# Patient Record
Sex: MALE | Race: WHITE | Employment: OTHER | URBAN - METROPOLITAN AREA
[De-identification: names, ages, dates, MRNs, and addresses within clinical notes are randomized per-mention and may not be internally consistent; named-entity substitution may affect disease eponyms.]

---

## 2022-01-01 ENCOUNTER — APPOINTMENT (OUTPATIENT)
Dept: GENERAL RADIOLOGY | Age: 67
DRG: 871 | End: 2022-01-01
Payer: MEDICARE

## 2022-01-01 ENCOUNTER — HOSPITAL ENCOUNTER (INPATIENT)
Age: 67
LOS: 1 days | DRG: 871 | End: 2022-12-27
Attending: EMERGENCY MEDICINE | Admitting: INTERNAL MEDICINE
Payer: MEDICARE

## 2022-01-01 ENCOUNTER — APPOINTMENT (OUTPATIENT)
Dept: CT IMAGING | Age: 67
DRG: 871 | End: 2022-01-01
Payer: MEDICARE

## 2022-01-01 VITALS
RESPIRATION RATE: 22 BRPM | WEIGHT: 159 LBS | HEART RATE: 69 BPM | SYSTOLIC BLOOD PRESSURE: 109 MMHG | OXYGEN SATURATION: 97 % | DIASTOLIC BLOOD PRESSURE: 68 MMHG | BODY MASS INDEX: 19.87 KG/M2 | TEMPERATURE: 98.1 F

## 2022-01-01 DIAGNOSIS — J96.22 ACUTE ON CHRONIC RESPIRATORY FAILURE WITH HYPOXIA AND HYPERCAPNIA (HCC): ICD-10-CM

## 2022-01-01 DIAGNOSIS — J96.21 ACUTE ON CHRONIC RESPIRATORY FAILURE WITH HYPOXIA AND HYPERCAPNIA (HCC): ICD-10-CM

## 2022-01-01 DIAGNOSIS — E87.0 HYPERNATREMIA: Primary | ICD-10-CM

## 2022-01-01 DIAGNOSIS — J18.9 PNEUMONIA DUE TO INFECTIOUS ORGANISM, UNSPECIFIED LATERALITY, UNSPECIFIED PART OF LUNG: ICD-10-CM

## 2022-01-01 LAB
ABSOLUTE BANDS #: 3.92 K/UL (ref 0–1)
ABSOLUTE EOS #: 0 K/UL (ref 0–0.4)
ABSOLUTE LYMPH #: 0.44 K/UL (ref 1–4.8)
ABSOLUTE MONO #: 1.74 K/UL (ref 0.1–1.3)
ALBUMIN SERPL-MCNC: 3.8 G/DL (ref 3.5–5.2)
ALLEN TEST: ABNORMAL
ALP BLD-CCNC: 144 U/L (ref 40–129)
ALT SERPL-CCNC: 38 U/L (ref 5–41)
AMORPHOUS: ABNORMAL
ANION GAP SERPL CALCULATED.3IONS-SCNC: 10 MMOL/L (ref 9–17)
ANION GAP SERPL CALCULATED.3IONS-SCNC: 10 MMOL/L (ref 9–17)
ANION GAP SERPL CALCULATED.3IONS-SCNC: 12 MMOL/L (ref 9–17)
AST SERPL-CCNC: 27 U/L
BACTERIA: ABNORMAL
BANDS: 18 % (ref 0–10)
BASOPHILS # BLD: 0 % (ref 0–2)
BASOPHILS ABSOLUTE: 0 K/UL (ref 0–0.2)
BILIRUB SERPL-MCNC: 0.4 MG/DL (ref 0.3–1.2)
BILIRUBIN URINE: ABNORMAL
BUN BLDV-MCNC: 170 MG/DL (ref 8–23)
BUN BLDV-MCNC: 176 MG/DL (ref 8–23)
BUN BLDV-MCNC: 183 MG/DL (ref 8–23)
CALCIUM SERPL-MCNC: 10.8 MG/DL (ref 8.6–10.4)
CALCIUM SERPL-MCNC: 11.6 MG/DL (ref 8.6–10.4)
CALCIUM SERPL-MCNC: 12.7 MG/DL (ref 8.6–10.4)
CARBOXYHEMOGLOBIN: 1 % (ref 0–5)
CHLORIDE BLD-SCNC: 101 MMOL/L (ref 98–107)
CHLORIDE BLD-SCNC: 104 MMOL/L (ref 98–107)
CHLORIDE BLD-SCNC: 109 MMOL/L (ref 98–107)
CO2: 37 MMOL/L (ref 20–31)
CO2: 40 MMOL/L (ref 20–31)
CO2: 43 MMOL/L (ref 20–31)
COLOR: ABNORMAL
CREAT SERPL-MCNC: 3.03 MG/DL (ref 0.7–1.2)
CREAT SERPL-MCNC: 3.26 MG/DL (ref 0.7–1.2)
CREAT SERPL-MCNC: 3.29 MG/DL (ref 0.7–1.2)
EOSINOPHILS RELATIVE PERCENT: 0 % (ref 0–4)
EPITHELIAL CELLS UA: ABNORMAL /HPF
FIO2: ABNORMAL
GFR SERPL CREATININE-BSD FRML MDRD: 20 ML/MIN/1.73M2
GFR SERPL CREATININE-BSD FRML MDRD: 20 ML/MIN/1.73M2
GFR SERPL CREATININE-BSD FRML MDRD: 22 ML/MIN/1.73M2
GLUCOSE BLD-MCNC: 140 MG/DL (ref 70–99)
GLUCOSE BLD-MCNC: 168 MG/DL (ref 75–110)
GLUCOSE BLD-MCNC: 305 MG/DL (ref 70–99)
GLUCOSE BLD-MCNC: 363 MG/DL (ref 75–110)
GLUCOSE BLD-MCNC: 459 MG/DL (ref 70–99)
GLUCOSE URINE: ABNORMAL
HCO3 ARTERIAL: 45.2 MMOL/L (ref 22–26)
HCT VFR BLD CALC: 42.3 % (ref 41–53)
HEMOGLOBIN: 13.3 G/DL (ref 13.5–17.5)
INFLUENZA A: NOT DETECTED
INFLUENZA B: NOT DETECTED
INR BLD: 5
KETONES, URINE: ABNORMAL
LACTIC ACID, SEPSIS: 1.4 MMOL/L (ref 0.5–1.9)
LACTIC ACID, SEPSIS: 1.4 MMOL/L (ref 0.5–1.9)
LEUKOCYTE ESTERASE, URINE: ABNORMAL
LIPASE: 24 U/L (ref 13–60)
LYMPHOCYTES # BLD: 2 % (ref 24–44)
MAGNESIUM: 4.7 MG/DL (ref 1.6–2.6)
MCH RBC QN AUTO: 29.4 PG (ref 26–34)
MCHC RBC AUTO-ENTMCNC: 31.5 G/DL (ref 31–37)
MCV RBC AUTO: 93.4 FL (ref 80–100)
METHEMOGLOBIN: 0.6 % (ref 0–1.9)
MODE: ABNORMAL
MONOCYTES # BLD: 8 % (ref 1–7)
MORPHOLOGY: ABNORMAL
MORPHOLOGY: ABNORMAL
NITRITE, URINE: NEGATIVE
O2 DEVICE/FLOW/%: ABNORMAL
O2 SAT, ARTERIAL: 89 % (ref 95–98)
PATIENT TEMP: 37
PCO2 ARTERIAL: 76.8 MMHG (ref 35–45)
PDW BLD-RTO: 17.7 % (ref 11.5–14.9)
PH ARTERIAL: 7.38 (ref 7.35–7.45)
PH UA: 5 (ref 5–8)
PLATELET # BLD: 171 K/UL (ref 150–450)
PMV BLD AUTO: 9.8 FL (ref 6–12)
PO2 ARTERIAL: 61.5 MMHG (ref 80–100)
POSITIVE BASE EXCESS, ART: 20.1 MMOL/L (ref 0–2)
POTASSIUM SERPL-SCNC: 4.1 MMOL/L (ref 3.7–5.3)
POTASSIUM SERPL-SCNC: 4.2 MMOL/L (ref 3.7–5.3)
POTASSIUM SERPL-SCNC: 4.5 MMOL/L (ref 3.7–5.3)
PROCALCITONIN: 0.39 NG/ML
PROTEIN UA: ABNORMAL
PROTHROMBIN TIME: 46.4 SEC (ref 11.8–14.6)
PT. POSITION: ABNORMAL
RBC # BLD: 4.53 M/UL (ref 4.5–5.9)
RBC UA: ABNORMAL /HPF
REASON FOR REJECTION: NORMAL
RESPIRATORY RATE: 25
SAMPLE SITE: ABNORMAL
SARS-COV-2 RNA, RT PCR: NOT DETECTED
SEG NEUTROPHILS: 72 % (ref 36–66)
SEGMENTED NEUTROPHILS ABSOLUTE COUNT: 15.7 K/UL (ref 1.3–9.1)
SODIUM BLD-SCNC: 147 MMOL/L (ref 135–144)
SODIUM BLD-SCNC: 147 MMOL/L (ref 135–144)
SODIUM BLD-SCNC: 151 MMOL/L (ref 135–144)
SODIUM BLD-SCNC: 153 MMOL/L (ref 135–144)
SODIUM BLD-SCNC: 154 MMOL/L (ref 135–144)
SODIUM BLD-SCNC: 162 MMOL/L (ref 135–144)
SOURCE: NORMAL
SPECIFIC GRAVITY UA: 1.02 (ref 1–1.03)
SPECIMEN DESCRIPTION: NORMAL
TEXT FOR RESPIRATORY: ABNORMAL
TOTAL PROTEIN: 8.5 G/DL (ref 6.4–8.3)
TROPONIN, HIGH SENSITIVITY: 178 NG/L (ref 0–22)
TURBIDITY: ABNORMAL
URINE HGB: ABNORMAL
UROBILINOGEN, URINE: NORMAL
WBC # BLD: 21.8 K/UL (ref 3.5–11)
WBC UA: ABNORMAL /HPF
ZZ NTE CLEAN UP: ORDERED TEST: NORMAL
ZZ NTE WITH NAME CLEAN UP: SPECIMEN SOURCE: NORMAL

## 2022-01-01 PROCEDURE — 6370000000 HC RX 637 (ALT 250 FOR IP): Performed by: NURSE PRACTITIONER

## 2022-01-01 PROCEDURE — 2580000003 HC RX 258: Performed by: INTERNAL MEDICINE

## 2022-01-01 PROCEDURE — 87086 URINE CULTURE/COLONY COUNT: CPT

## 2022-01-01 PROCEDURE — 82805 BLOOD GASES W/O2 SATURATION: CPT

## 2022-01-01 PROCEDURE — 87040 BLOOD CULTURE FOR BACTERIA: CPT

## 2022-01-01 PROCEDURE — 2500000003 HC RX 250 WO HCPCS: Performed by: EMERGENCY MEDICINE

## 2022-01-01 PROCEDURE — 2580000003 HC RX 258: Performed by: EMERGENCY MEDICINE

## 2022-01-01 PROCEDURE — 1200000000 HC SEMI PRIVATE

## 2022-01-01 PROCEDURE — 80048 BASIC METABOLIC PNL TOTAL CA: CPT

## 2022-01-01 PROCEDURE — 99223 1ST HOSP IP/OBS HIGH 75: CPT | Performed by: INTERNAL MEDICINE

## 2022-01-01 PROCEDURE — 6370000000 HC RX 637 (ALT 250 FOR IP): Performed by: EMERGENCY MEDICINE

## 2022-01-01 PROCEDURE — 84295 ASSAY OF SERUM SODIUM: CPT

## 2022-01-01 PROCEDURE — 96375 TX/PRO/DX INJ NEW DRUG ADDON: CPT

## 2022-01-01 PROCEDURE — 94660 CPAP INITIATION&MGMT: CPT

## 2022-01-01 PROCEDURE — 84145 PROCALCITONIN (PCT): CPT

## 2022-01-01 PROCEDURE — 51702 INSERT TEMP BLADDER CATH: CPT

## 2022-01-01 PROCEDURE — 02HV33Z INSERTION OF INFUSION DEVICE INTO SUPERIOR VENA CAVA, PERCUTANEOUS APPROACH: ICD-10-PCS | Performed by: STUDENT IN AN ORGANIZED HEALTH CARE EDUCATION/TRAINING PROGRAM

## 2022-01-01 PROCEDURE — 6360000002 HC RX W HCPCS: Performed by: NURSE PRACTITIONER

## 2022-01-01 PROCEDURE — 71045 X-RAY EXAM CHEST 1 VIEW: CPT

## 2022-01-01 PROCEDURE — 87641 MR-STAPH DNA AMP PROBE: CPT

## 2022-01-01 PROCEDURE — 93005 ELECTROCARDIOGRAM TRACING: CPT | Performed by: EMERGENCY MEDICINE

## 2022-01-01 PROCEDURE — 36415 COLL VENOUS BLD VENIPUNCTURE: CPT

## 2022-01-01 PROCEDURE — 87636 SARSCOV2 & INF A&B AMP PRB: CPT

## 2022-01-01 PROCEDURE — 96374 THER/PROPH/DIAG INJ IV PUSH: CPT

## 2022-01-01 PROCEDURE — 82947 ASSAY GLUCOSE BLOOD QUANT: CPT

## 2022-01-01 PROCEDURE — 83735 ASSAY OF MAGNESIUM: CPT

## 2022-01-01 PROCEDURE — 83605 ASSAY OF LACTIC ACID: CPT

## 2022-01-01 PROCEDURE — 81001 URINALYSIS AUTO W/SCOPE: CPT

## 2022-01-01 PROCEDURE — 94761 N-INVAS EAR/PLS OXIMETRY MLT: CPT

## 2022-01-01 PROCEDURE — 96365 THER/PROPH/DIAG IV INF INIT: CPT

## 2022-01-01 PROCEDURE — 85025 COMPLETE CBC W/AUTO DIFF WBC: CPT

## 2022-01-01 PROCEDURE — 6360000002 HC RX W HCPCS: Performed by: EMERGENCY MEDICINE

## 2022-01-01 PROCEDURE — 84484 ASSAY OF TROPONIN QUANT: CPT

## 2022-01-01 PROCEDURE — 80053 COMPREHEN METABOLIC PANEL: CPT

## 2022-01-01 PROCEDURE — 99285 EMERGENCY DEPT VISIT HI MDM: CPT

## 2022-01-01 PROCEDURE — 2700000000 HC OXYGEN THERAPY PER DAY

## 2022-01-01 PROCEDURE — 2580000003 HC RX 258: Performed by: NURSE PRACTITIONER

## 2022-01-01 PROCEDURE — 36600 WITHDRAWAL OF ARTERIAL BLOOD: CPT

## 2022-01-01 PROCEDURE — 2500000003 HC RX 250 WO HCPCS: Performed by: NURSE PRACTITIONER

## 2022-01-01 PROCEDURE — 83690 ASSAY OF LIPASE: CPT

## 2022-01-01 PROCEDURE — 94640 AIRWAY INHALATION TREATMENT: CPT

## 2022-01-01 PROCEDURE — 71250 CT THORAX DX C-: CPT

## 2022-01-01 PROCEDURE — 85610 PROTHROMBIN TIME: CPT

## 2022-01-01 RX ORDER — FUROSEMIDE 40 MG/1
40 TABLET ORAL DAILY
Status: DISCONTINUED | OUTPATIENT
Start: 2022-01-01 | End: 2022-01-01 | Stop reason: HOSPADM

## 2022-01-01 RX ORDER — ACETAMINOPHEN 325 MG/1
650 TABLET ORAL EVERY 6 HOURS PRN
Status: DISCONTINUED | OUTPATIENT
Start: 2022-01-01 | End: 2022-01-01 | Stop reason: HOSPADM

## 2022-01-01 RX ORDER — METRONIDAZOLE 500 MG/100ML
500 INJECTION, SOLUTION INTRAVENOUS ONCE
Status: COMPLETED | OUTPATIENT
Start: 2022-01-01 | End: 2022-01-01

## 2022-01-01 RX ORDER — SODIUM CHLORIDE 9 MG/ML
INJECTION, SOLUTION INTRAVENOUS CONTINUOUS
Status: DISCONTINUED | OUTPATIENT
Start: 2022-01-01 | End: 2022-01-01 | Stop reason: HOSPADM

## 2022-01-01 RX ORDER — BISACODYL 10 MG
10 SUPPOSITORY, RECTAL RECTAL DAILY PRN
Status: DISCONTINUED | OUTPATIENT
Start: 2022-01-01 | End: 2022-01-01 | Stop reason: HOSPADM

## 2022-01-01 RX ORDER — ACETAMINOPHEN 650 MG/1
650 SUPPOSITORY RECTAL EVERY 6 HOURS PRN
Status: DISCONTINUED | OUTPATIENT
Start: 2022-01-01 | End: 2022-01-01 | Stop reason: HOSPADM

## 2022-01-01 RX ORDER — IPRATROPIUM BROMIDE AND ALBUTEROL SULFATE 2.5; .5 MG/3ML; MG/3ML
1 SOLUTION RESPIRATORY (INHALATION) EVERY 4 HOURS PRN
Status: DISCONTINUED | OUTPATIENT
Start: 2022-01-01 | End: 2022-01-01 | Stop reason: HOSPADM

## 2022-01-01 RX ORDER — DEXTROSE MONOHYDRATE 100 MG/ML
INJECTION, SOLUTION INTRAVENOUS CONTINUOUS PRN
Status: DISCONTINUED | OUTPATIENT
Start: 2022-01-01 | End: 2022-01-01 | Stop reason: HOSPADM

## 2022-01-01 RX ORDER — LORAZEPAM 2 MG/ML
1 INJECTION INTRAMUSCULAR EVERY 6 HOURS PRN
Status: DISCONTINUED | OUTPATIENT
Start: 2022-01-01 | End: 2022-01-01 | Stop reason: HOSPADM

## 2022-01-01 RX ORDER — HYDROCODONE BITARTRATE AND ACETAMINOPHEN 5; 325 MG/1; MG/1
1 TABLET ORAL EVERY 6 HOURS PRN
Status: DISCONTINUED | OUTPATIENT
Start: 2022-01-01 | End: 2022-01-01 | Stop reason: HOSPADM

## 2022-01-01 RX ORDER — SODIUM CHLORIDE 0.9 % (FLUSH) 0.9 %
5-40 SYRINGE (ML) INJECTION EVERY 12 HOURS SCHEDULED
Status: DISCONTINUED | OUTPATIENT
Start: 2022-01-01 | End: 2022-01-01 | Stop reason: HOSPADM

## 2022-01-01 RX ORDER — METRONIDAZOLE 500 MG/100ML
500 INJECTION, SOLUTION INTRAVENOUS EVERY 8 HOURS
Status: DISCONTINUED | OUTPATIENT
Start: 2022-01-01 | End: 2022-01-01 | Stop reason: HOSPADM

## 2022-01-01 RX ORDER — ALBUTEROL SULFATE 2.5 MG/3ML
2.5 SOLUTION RESPIRATORY (INHALATION) EVERY 4 HOURS PRN
Status: DISCONTINUED | OUTPATIENT
Start: 2022-01-01 | End: 2022-01-01 | Stop reason: HOSPADM

## 2022-01-01 RX ORDER — SODIUM CHLORIDE 0.9 % (FLUSH) 0.9 %
5-40 SYRINGE (ML) INJECTION PRN
Status: DISCONTINUED | OUTPATIENT
Start: 2022-01-01 | End: 2022-01-01 | Stop reason: HOSPADM

## 2022-01-01 RX ORDER — IPRATROPIUM BROMIDE AND ALBUTEROL SULFATE 2.5; .5 MG/3ML; MG/3ML
SOLUTION RESPIRATORY (INHALATION)
Status: DISPENSED
Start: 2022-01-01 | End: 2022-01-01

## 2022-01-01 RX ORDER — INSULIN GLARGINE 100 [IU]/ML
15 INJECTION, SOLUTION SUBCUTANEOUS NIGHTLY
COMMUNITY

## 2022-01-01 RX ORDER — WARFARIN SODIUM 1 MG/1
1 TABLET ORAL EVERY EVENING
COMMUNITY

## 2022-01-01 RX ORDER — INSULIN GLARGINE 100 [IU]/ML
15 INJECTION, SOLUTION SUBCUTANEOUS ONCE
Status: COMPLETED | OUTPATIENT
Start: 2022-01-01 | End: 2022-01-01

## 2022-01-01 RX ORDER — SODIUM CHLORIDE 9 MG/ML
INJECTION, SOLUTION INTRAVENOUS PRN
Status: DISCONTINUED | OUTPATIENT
Start: 2022-01-01 | End: 2022-01-01 | Stop reason: HOSPADM

## 2022-01-01 RX ORDER — LINEZOLID 2 MG/ML
600 INJECTION, SOLUTION INTRAVENOUS EVERY 12 HOURS
Status: DISCONTINUED | OUTPATIENT
Start: 2022-01-01 | End: 2022-01-01 | Stop reason: HOSPADM

## 2022-01-01 RX ORDER — SODIUM CHLORIDE 9 MG/ML
INJECTION, SOLUTION INTRAVENOUS CONTINUOUS
Status: DISCONTINUED | OUTPATIENT
Start: 2022-01-01 | End: 2022-01-01

## 2022-01-01 RX ORDER — INSULIN LISPRO 100 [IU]/ML
0-4 INJECTION, SOLUTION INTRAVENOUS; SUBCUTANEOUS NIGHTLY
Status: DISCONTINUED | OUTPATIENT
Start: 2022-01-01 | End: 2022-01-01 | Stop reason: HOSPADM

## 2022-01-01 RX ORDER — CARVEDILOL 6.25 MG/1
6.25 TABLET ORAL 2 TIMES DAILY
Status: DISCONTINUED | OUTPATIENT
Start: 2022-01-01 | End: 2022-01-01 | Stop reason: HOSPADM

## 2022-01-01 RX ORDER — INSULIN LISPRO 100 [IU]/ML
0-8 INJECTION, SOLUTION INTRAVENOUS; SUBCUTANEOUS
Status: DISCONTINUED | OUTPATIENT
Start: 2022-01-01 | End: 2022-01-01 | Stop reason: HOSPADM

## 2022-01-01 RX ORDER — HYDROCODONE BITARTRATE AND ACETAMINOPHEN 5; 325 MG/1; MG/1
1 TABLET ORAL EVERY 6 HOURS PRN
COMMUNITY

## 2022-01-01 RX ORDER — SPIRONOLACTONE 25 MG/1
25 TABLET ORAL DAILY
Status: DISCONTINUED | OUTPATIENT
Start: 2022-01-01 | End: 2022-01-01 | Stop reason: HOSPADM

## 2022-01-01 RX ORDER — INSULIN GLARGINE 100 [IU]/ML
15 INJECTION, SOLUTION SUBCUTANEOUS NIGHTLY
Status: DISCONTINUED | OUTPATIENT
Start: 2022-01-01 | End: 2022-01-01 | Stop reason: HOSPADM

## 2022-01-01 RX ORDER — POLYETHYLENE GLYCOL 3350 17 G/17G
17 POWDER, FOR SOLUTION ORAL DAILY
Status: DISCONTINUED | OUTPATIENT
Start: 2022-01-01 | End: 2022-01-01 | Stop reason: HOSPADM

## 2022-01-01 RX ORDER — INSULIN LISPRO 100 [IU]/ML
5 INJECTION, SOLUTION INTRAVENOUS; SUBCUTANEOUS ONCE
Status: COMPLETED | OUTPATIENT
Start: 2022-01-01 | End: 2022-01-01

## 2022-01-01 RX ORDER — 0.9 % SODIUM CHLORIDE 0.9 %
500 INTRAVENOUS SOLUTION INTRAVENOUS ONCE
Status: COMPLETED | OUTPATIENT
Start: 2022-01-01 | End: 2022-01-01

## 2022-01-01 RX ORDER — 0.9 % SODIUM CHLORIDE 0.9 %
500 INTRAVENOUS SOLUTION INTRAVENOUS ONCE
Status: DISCONTINUED | OUTPATIENT
Start: 2022-01-01 | End: 2022-01-01 | Stop reason: HOSPADM

## 2022-01-01 RX ADMIN — POLYETHYLENE GLYCOL 3350 17 G: 17 POWDER, FOR SOLUTION ORAL at 11:32

## 2022-01-01 RX ADMIN — DEXTROSE MONOHYDRATE: 50 INJECTION, SOLUTION INTRAVENOUS at 21:08

## 2022-01-01 RX ADMIN — IPRATROPIUM BROMIDE AND ALBUTEROL SULFATE 1 AMPULE: 2.5; .5 SOLUTION RESPIRATORY (INHALATION) at 20:48

## 2022-01-01 RX ADMIN — METRONIDAZOLE 500 MG: 500 INJECTION, SOLUTION INTRAVENOUS at 20:30

## 2022-01-01 RX ADMIN — INSULIN LISPRO 5 UNITS: 100 INJECTION, SOLUTION INTRAVENOUS; SUBCUTANEOUS at 01:53

## 2022-01-01 RX ADMIN — METRONIDAZOLE 500 MG: 500 INJECTION, SOLUTION INTRAVENOUS at 04:37

## 2022-01-01 RX ADMIN — VASOPRESSIN: 20 INJECTION, SOLUTION INTRAVENOUS at 10:34

## 2022-01-01 RX ADMIN — CEFEPIME 2000 MG: 2 INJECTION, POWDER, FOR SOLUTION INTRAVENOUS at 19:49

## 2022-01-01 RX ADMIN — SODIUM CHLORIDE 500 ML: 9 INJECTION, SOLUTION INTRAVENOUS at 00:11

## 2022-01-01 RX ADMIN — VASOPRESSIN: 20 INJECTION, SOLUTION INTRAVENOUS at 16:50

## 2022-01-01 RX ADMIN — INSULIN LISPRO 6 UNITS: 100 INJECTION, SOLUTION INTRAVENOUS; SUBCUTANEOUS at 08:16

## 2022-01-01 RX ADMIN — VASOPRESSIN: 20 INJECTION, SOLUTION INTRAVENOUS at 02:55

## 2022-01-01 RX ADMIN — SODIUM CHLORIDE: 9 INJECTION, SOLUTION INTRAVENOUS at 19:39

## 2022-01-01 RX ADMIN — INSULIN GLARGINE 15 UNITS: 100 INJECTION, SOLUTION SUBCUTANEOUS at 01:51

## 2022-01-01 RX ADMIN — METRONIDAZOLE 500 MG: 500 INJECTION, SOLUTION INTRAVENOUS at 12:40

## 2022-01-01 RX ADMIN — IPRATROPIUM BROMIDE AND ALBUTEROL SULFATE 1 AMPULE: 2.5; .5 SOLUTION RESPIRATORY (INHALATION) at 20:49

## 2022-01-01 RX ADMIN — VANCOMYCIN HYDROCHLORIDE 1750 MG: 1 INJECTION, POWDER, LYOPHILIZED, FOR SOLUTION INTRAVENOUS at 20:28

## 2022-01-01 RX ADMIN — CEFEPIME 1000 MG: 1 INJECTION, POWDER, FOR SOLUTION INTRAMUSCULAR; INTRAVENOUS at 09:37

## 2022-01-01 RX ADMIN — DEXTROSE MONOHYDRATE 5 MCG/MIN: 50 INJECTION, SOLUTION INTRAVENOUS at 06:10

## 2022-09-27 ENCOUNTER — APPOINTMENT (OUTPATIENT)
Dept: CT IMAGING | Age: 67
DRG: 871 | End: 2022-09-27
Payer: MEDICARE

## 2022-09-27 ENCOUNTER — HOSPITAL ENCOUNTER (INPATIENT)
Age: 67
LOS: 1 days | Discharge: ANOTHER HOSPITAL | DRG: 871 | End: 2022-09-28
Attending: EMERGENCY MEDICINE | Admitting: INTERNAL MEDICINE
Payer: MEDICARE

## 2022-09-27 ENCOUNTER — APPOINTMENT (OUTPATIENT)
Dept: GENERAL RADIOLOGY | Age: 67
DRG: 871 | End: 2022-09-27
Payer: MEDICARE

## 2022-09-27 DIAGNOSIS — R65.21 SEPTIC SHOCK (HCC): Primary | ICD-10-CM

## 2022-09-27 DIAGNOSIS — J18.9 PNEUMONIA OF RIGHT LOWER LOBE DUE TO INFECTIOUS ORGANISM: ICD-10-CM

## 2022-09-27 DIAGNOSIS — A41.9 SEPTIC SHOCK (HCC): Primary | ICD-10-CM

## 2022-09-27 DIAGNOSIS — L08.9 FOOT INFECTION: ICD-10-CM

## 2022-09-27 LAB
ABSOLUTE EOS #: 0 K/UL (ref 0–0.4)
ABSOLUTE IMMATURE GRANULOCYTE: 0 K/UL (ref 0–0.3)
ABSOLUTE LYMPH #: 0.48 K/UL (ref 1–4.8)
ABSOLUTE MONO #: 1.91 K/UL (ref 0.1–0.8)
ALBUMIN SERPL-MCNC: 2.9 G/DL (ref 3.5–5.2)
ALBUMIN/GLOBULIN RATIO: 0.8 (ref 1–2.5)
ALP BLD-CCNC: 119 U/L (ref 40–129)
ALT SERPL-CCNC: 9 U/L (ref 5–41)
AMMONIA: 20 UMOL/L (ref 16–60)
ANION GAP SERPL CALCULATED.3IONS-SCNC: 13 MMOL/L (ref 9–17)
AST SERPL-CCNC: 14 U/L
BASOPHILS # BLD: 0 % (ref 0–2)
BASOPHILS ABSOLUTE: 0 K/UL (ref 0–0.2)
BILIRUB SERPL-MCNC: 0.4 MG/DL (ref 0.3–1.2)
BUN BLDV-MCNC: 56 MG/DL (ref 8–23)
CALCIUM SERPL-MCNC: 8.4 MG/DL (ref 8.6–10.4)
CHLORIDE BLD-SCNC: 86 MMOL/L (ref 98–107)
CHP ED QC CHECK: YES
CHP ED QC CHECK: YES
CO2: 25 MMOL/L (ref 20–31)
CREAT SERPL-MCNC: 2.04 MG/DL (ref 0.7–1.2)
EOSINOPHILS RELATIVE PERCENT: 0 % (ref 1–4)
GFR AFRICAN AMERICAN: 40 ML/MIN
GFR NON-AFRICAN AMERICAN: 33 ML/MIN
GFR SERPL CREATININE-BSD FRML MDRD: ABNORMAL ML/MIN/{1.73_M2}
GLUCOSE BLD-MCNC: 356 MG/DL
GLUCOSE BLD-MCNC: 356 MG/DL (ref 75–110)
GLUCOSE BLD-MCNC: 396 MG/DL
GLUCOSE BLD-MCNC: 396 MG/DL (ref 75–110)
GLUCOSE BLD-MCNC: 407 MG/DL (ref 70–99)
GLUCOSE BLD-MCNC: 455 MG/DL (ref 75–110)
GLUCOSE BLD-MCNC: 463 MG/DL (ref 75–110)
HCT VFR BLD CALC: 26.4 % (ref 40.7–50.3)
HCT VFR BLD CALC: 28.7 % (ref 40.7–50.3)
HEMOGLOBIN: 8.9 G/DL (ref 13–17)
HEMOGLOBIN: 9.6 G/DL (ref 13–17)
IMMATURE GRANULOCYTES: 0 %
INR BLD: 1.6
LACTIC ACID, WHOLE BLOOD: 1.9 MMOL/L (ref 0.7–2.1)
LACTIC ACID, WHOLE BLOOD: 2.7 MMOL/L (ref 0.7–2.1)
LIPASE: 8 U/L (ref 13–60)
LYMPHOCYTES # BLD: 2 % (ref 24–44)
MCH RBC QN AUTO: 31.4 PG (ref 25.2–33.5)
MCH RBC QN AUTO: 31.4 PG (ref 25.2–33.5)
MCHC RBC AUTO-ENTMCNC: 33.4 G/DL (ref 28.4–34.8)
MCHC RBC AUTO-ENTMCNC: 33.7 G/DL (ref 28.4–34.8)
MCV RBC AUTO: 93.3 FL (ref 82.6–102.9)
MCV RBC AUTO: 93.8 FL (ref 82.6–102.9)
MONOCYTES # BLD: 8 % (ref 1–7)
MORPHOLOGY: ABNORMAL
MORPHOLOGY: ABNORMAL
NRBC AUTOMATED: 0 PER 100 WBC
NRBC AUTOMATED: 0 PER 100 WBC
PARTIAL THROMBOPLASTIN TIME: 31.3 SEC (ref 20.5–30.5)
PARTIAL THROMBOPLASTIN TIME: 32.4 SEC (ref 20.5–30.5)
PDW BLD-RTO: 13.5 % (ref 11.8–14.4)
PDW BLD-RTO: 13.7 % (ref 11.8–14.4)
PLATELET # BLD: 193 K/UL (ref 138–453)
PLATELET # BLD: 223 K/UL (ref 138–453)
PMV BLD AUTO: 10.5 FL (ref 8.1–13.5)
PMV BLD AUTO: 10.6 FL (ref 8.1–13.5)
POTASSIUM SERPL-SCNC: 4.8 MMOL/L (ref 3.7–5.3)
PROTHROMBIN TIME: 15.9 SEC (ref 9.1–12.3)
RBC # BLD: 2.83 M/UL (ref 4.21–5.77)
RBC # BLD: 3.06 M/UL (ref 4.21–5.77)
SARS-COV-2, RAPID: NOT DETECTED
SEG NEUTROPHILS: 90 % (ref 36–66)
SEGMENTED NEUTROPHILS ABSOLUTE COUNT: 21.51 K/UL (ref 1.8–7.7)
SODIUM BLD-SCNC: 124 MMOL/L (ref 135–144)
SPECIMEN DESCRIPTION: NORMAL
THYROXINE, FREE: 1.6 NG/DL (ref 0.93–1.7)
TOTAL PROTEIN: 6.6 G/DL (ref 6.4–8.3)
TROPONIN, HIGH SENSITIVITY: 120 NG/L (ref 0–22)
TROPONIN, HIGH SENSITIVITY: 151 NG/L (ref 0–22)
TSH SERPL DL<=0.05 MIU/L-ACNC: 0.16 UIU/ML (ref 0.3–5)
WBC # BLD: 23.9 K/UL (ref 3.5–11.3)
WBC # BLD: 34.7 K/UL (ref 3.5–11.3)

## 2022-09-27 PROCEDURE — C9113 INJ PANTOPRAZOLE SODIUM, VIA: HCPCS | Performed by: STUDENT IN AN ORGANIZED HEALTH CARE EDUCATION/TRAINING PROGRAM

## 2022-09-27 PROCEDURE — 87077 CULTURE AEROBIC IDENTIFY: CPT

## 2022-09-27 PROCEDURE — 71045 X-RAY EXAM CHEST 1 VIEW: CPT

## 2022-09-27 PROCEDURE — 85610 PROTHROMBIN TIME: CPT

## 2022-09-27 PROCEDURE — 06HY33Z INSERTION OF INFUSION DEVICE INTO LOWER VEIN, PERCUTANEOUS APPROACH: ICD-10-PCS | Performed by: STUDENT IN AN ORGANIZED HEALTH CARE EDUCATION/TRAINING PROGRAM

## 2022-09-27 PROCEDURE — 87040 BLOOD CULTURE FOR BACTERIA: CPT

## 2022-09-27 PROCEDURE — 2000000000 HC ICU R&B

## 2022-09-27 PROCEDURE — 85027 COMPLETE CBC AUTOMATED: CPT

## 2022-09-27 PROCEDURE — 96365 THER/PROPH/DIAG IV INF INIT: CPT

## 2022-09-27 PROCEDURE — 96375 TX/PRO/DX INJ NEW DRUG ADDON: CPT

## 2022-09-27 PROCEDURE — 85730 THROMBOPLASTIN TIME PARTIAL: CPT

## 2022-09-27 PROCEDURE — 99285 EMERGENCY DEPT VISIT HI MDM: CPT

## 2022-09-27 PROCEDURE — 83690 ASSAY OF LIPASE: CPT

## 2022-09-27 PROCEDURE — 87154 CUL TYP ID BLD PTHGN 6+ TRGT: CPT

## 2022-09-27 PROCEDURE — 73630 X-RAY EXAM OF FOOT: CPT

## 2022-09-27 PROCEDURE — 6370000000 HC RX 637 (ALT 250 FOR IP): Performed by: STUDENT IN AN ORGANIZED HEALTH CARE EDUCATION/TRAINING PROGRAM

## 2022-09-27 PROCEDURE — 87186 SC STD MICRODIL/AGAR DIL: CPT

## 2022-09-27 PROCEDURE — 87205 SMEAR GRAM STAIN: CPT

## 2022-09-27 PROCEDURE — 86403 PARTICLE AGGLUT ANTBDY SCRN: CPT

## 2022-09-27 PROCEDURE — A4216 STERILE WATER/SALINE, 10 ML: HCPCS | Performed by: STUDENT IN AN ORGANIZED HEALTH CARE EDUCATION/TRAINING PROGRAM

## 2022-09-27 PROCEDURE — 70450 CT HEAD/BRAIN W/O DYE: CPT

## 2022-09-27 PROCEDURE — 84484 ASSAY OF TROPONIN QUANT: CPT

## 2022-09-27 PROCEDURE — 83605 ASSAY OF LACTIC ACID: CPT

## 2022-09-27 PROCEDURE — 2580000003 HC RX 258

## 2022-09-27 PROCEDURE — 96361 HYDRATE IV INFUSION ADD-ON: CPT

## 2022-09-27 PROCEDURE — 93005 ELECTROCARDIOGRAM TRACING: CPT | Performed by: STUDENT IN AN ORGANIZED HEALTH CARE EDUCATION/TRAINING PROGRAM

## 2022-09-27 PROCEDURE — 87075 CULTR BACTERIA EXCEPT BLOOD: CPT

## 2022-09-27 PROCEDURE — 84439 ASSAY OF FREE THYROXINE: CPT

## 2022-09-27 PROCEDURE — 87635 SARS-COV-2 COVID-19 AMP PRB: CPT

## 2022-09-27 PROCEDURE — 2580000003 HC RX 258: Performed by: STUDENT IN AN ORGANIZED HEALTH CARE EDUCATION/TRAINING PROGRAM

## 2022-09-27 PROCEDURE — 2500000003 HC RX 250 WO HCPCS

## 2022-09-27 PROCEDURE — 85025 COMPLETE CBC W/AUTO DIFF WBC: CPT

## 2022-09-27 PROCEDURE — 6360000002 HC RX W HCPCS: Performed by: STUDENT IN AN ORGANIZED HEALTH CARE EDUCATION/TRAINING PROGRAM

## 2022-09-27 PROCEDURE — 82947 ASSAY GLUCOSE BLOOD QUANT: CPT

## 2022-09-27 PROCEDURE — 6360000004 HC RX CONTRAST MEDICATION: Performed by: EMERGENCY MEDICINE

## 2022-09-27 PROCEDURE — 84443 ASSAY THYROID STIM HORMONE: CPT

## 2022-09-27 PROCEDURE — 96367 TX/PROPH/DG ADDL SEQ IV INF: CPT

## 2022-09-27 PROCEDURE — 6370000000 HC RX 637 (ALT 250 FOR IP)

## 2022-09-27 PROCEDURE — 93005 ELECTROCARDIOGRAM TRACING: CPT

## 2022-09-27 PROCEDURE — 99291 CRITICAL CARE FIRST HOUR: CPT | Performed by: INTERNAL MEDICINE

## 2022-09-27 PROCEDURE — 74177 CT ABD & PELVIS W/CONTRAST: CPT

## 2022-09-27 PROCEDURE — 80053 COMPREHEN METABOLIC PANEL: CPT

## 2022-09-27 PROCEDURE — 6360000002 HC RX W HCPCS

## 2022-09-27 PROCEDURE — 82140 ASSAY OF AMMONIA: CPT

## 2022-09-27 PROCEDURE — 87070 CULTURE OTHR SPECIMN AEROBIC: CPT

## 2022-09-27 RX ORDER — HEPARIN SODIUM AND DEXTROSE 10000; 5 [USP'U]/100ML; G/100ML
5-30 INJECTION INTRAVENOUS CONTINUOUS
Status: CANCELLED | OUTPATIENT
Start: 2022-09-27

## 2022-09-27 RX ORDER — AMOXICILLIN 250 MG
1 CAPSULE ORAL DAILY
COMMUNITY

## 2022-09-27 RX ORDER — HYDROCODONE BITARTRATE AND ACETAMINOPHEN 5; 325 MG/1; MG/1
1 TABLET ORAL EVERY 6 HOURS PRN
Status: DISCONTINUED | OUTPATIENT
Start: 2022-09-27 | End: 2022-09-28 | Stop reason: HOSPADM

## 2022-09-27 RX ORDER — 0.9 % SODIUM CHLORIDE 0.9 %
1000 INTRAVENOUS SOLUTION INTRAVENOUS ONCE
Status: COMPLETED | OUTPATIENT
Start: 2022-09-27 | End: 2022-09-27

## 2022-09-27 RX ORDER — SPIRONOLACTONE 25 MG/1
25 TABLET ORAL DAILY
COMMUNITY

## 2022-09-27 RX ORDER — POLYETHYLENE GLYCOL 3350 17 G/17G
17 POWDER, FOR SOLUTION ORAL 2 TIMES DAILY
Status: DISCONTINUED | OUTPATIENT
Start: 2022-09-27 | End: 2022-09-28 | Stop reason: HOSPADM

## 2022-09-27 RX ORDER — HEPARIN SODIUM 1000 [USP'U]/ML
30 INJECTION, SOLUTION INTRAVENOUS; SUBCUTANEOUS PRN
Status: CANCELLED | OUTPATIENT
Start: 2022-09-27

## 2022-09-27 RX ORDER — ACETAMINOPHEN 325 MG/1
650 TABLET ORAL EVERY 6 HOURS PRN
Status: DISCONTINUED | OUTPATIENT
Start: 2022-09-27 | End: 2022-09-28 | Stop reason: HOSPADM

## 2022-09-27 RX ORDER — HEPARIN SODIUM 1000 [USP'U]/ML
4000 INJECTION, SOLUTION INTRAVENOUS; SUBCUTANEOUS ONCE
Status: COMPLETED | OUTPATIENT
Start: 2022-09-27 | End: 2022-09-28

## 2022-09-27 RX ORDER — BISACODYL 10 MG
10 SUPPOSITORY, RECTAL RECTAL DAILY
COMMUNITY

## 2022-09-27 RX ORDER — MIDODRINE HYDROCHLORIDE 5 MG/1
10 TABLET ORAL DAILY
COMMUNITY

## 2022-09-27 RX ORDER — POLYETHYLENE GLYCOL 3350 17 G/17G
17 POWDER, FOR SOLUTION ORAL DAILY
COMMUNITY

## 2022-09-27 RX ORDER — SODIUM CHLORIDE 9 MG/ML
1000 INJECTION, SOLUTION INTRAVENOUS CONTINUOUS
Status: ACTIVE | OUTPATIENT
Start: 2022-09-27 | End: 2022-09-27

## 2022-09-27 RX ORDER — DEXTROSE MONOHYDRATE 100 MG/ML
INJECTION, SOLUTION INTRAVENOUS CONTINUOUS PRN
Status: DISCONTINUED | OUTPATIENT
Start: 2022-09-27 | End: 2022-09-28 | Stop reason: HOSPADM

## 2022-09-27 RX ORDER — HEPARIN SODIUM 1000 [USP'U]/ML
60 INJECTION, SOLUTION INTRAVENOUS; SUBCUTANEOUS PRN
Status: CANCELLED | OUTPATIENT
Start: 2022-09-27

## 2022-09-27 RX ORDER — NOREPINEPHRINE BIT/0.9 % NACL 16MG/250ML
1-100 INFUSION BOTTLE (ML) INTRAVENOUS CONTINUOUS
Status: DISCONTINUED | OUTPATIENT
Start: 2022-09-27 | End: 2022-09-28

## 2022-09-27 RX ORDER — HEPARIN SODIUM 1000 [USP'U]/ML
2000 INJECTION, SOLUTION INTRAVENOUS; SUBCUTANEOUS PRN
Status: DISCONTINUED | OUTPATIENT
Start: 2022-09-27 | End: 2022-09-28 | Stop reason: HOSPADM

## 2022-09-27 RX ORDER — SODIUM CHLORIDE 9 MG/ML
INJECTION, SOLUTION INTRAVENOUS PRN
Status: DISCONTINUED | OUTPATIENT
Start: 2022-09-27 | End: 2022-09-28 | Stop reason: HOSPADM

## 2022-09-27 RX ORDER — METHYLPREDNISOLONE 4 MG/1
4 TABLET ORAL SEE ADMIN INSTRUCTIONS
COMMUNITY

## 2022-09-27 RX ORDER — HEPARIN SODIUM 1000 [USP'U]/ML
4000 INJECTION, SOLUTION INTRAVENOUS; SUBCUTANEOUS PRN
Status: DISCONTINUED | OUTPATIENT
Start: 2022-09-27 | End: 2022-09-28 | Stop reason: HOSPADM

## 2022-09-27 RX ORDER — AMMONIUM LACTATE 12 G/100G
CREAM TOPICAL PRN
COMMUNITY

## 2022-09-27 RX ORDER — SODIUM CHLORIDE 9 MG/ML
INJECTION, SOLUTION INTRAVENOUS CONTINUOUS
Status: DISCONTINUED | OUTPATIENT
Start: 2022-09-27 | End: 2022-09-28 | Stop reason: HOSPADM

## 2022-09-27 RX ORDER — ONDANSETRON 4 MG/1
4 TABLET, ORALLY DISINTEGRATING ORAL EVERY 8 HOURS PRN
Status: DISCONTINUED | OUTPATIENT
Start: 2022-09-27 | End: 2022-09-28 | Stop reason: HOSPADM

## 2022-09-27 RX ORDER — HYDROCODONE BITARTRATE AND ACETAMINOPHEN 5; 325 MG/1; MG/1
1 TABLET ORAL EVERY 6 HOURS PRN
COMMUNITY

## 2022-09-27 RX ORDER — SODIUM CHLORIDE, SODIUM LACTATE, POTASSIUM CHLORIDE, AND CALCIUM CHLORIDE .6; .31; .03; .02 G/100ML; G/100ML; G/100ML; G/100ML
1000 INJECTION, SOLUTION INTRAVENOUS ONCE
Status: COMPLETED | OUTPATIENT
Start: 2022-09-27 | End: 2022-09-27

## 2022-09-27 RX ORDER — ACETAMINOPHEN 650 MG/1
650 SUPPOSITORY RECTAL EVERY 6 HOURS PRN
Status: DISCONTINUED | OUTPATIENT
Start: 2022-09-27 | End: 2022-09-28 | Stop reason: HOSPADM

## 2022-09-27 RX ORDER — SODIUM CHLORIDE 0.9 % (FLUSH) 0.9 %
5-40 SYRINGE (ML) INJECTION EVERY 12 HOURS SCHEDULED
Status: DISCONTINUED | OUTPATIENT
Start: 2022-09-27 | End: 2022-09-28 | Stop reason: HOSPADM

## 2022-09-27 RX ORDER — WARFARIN SODIUM 6 MG/1
6 TABLET ORAL
COMMUNITY

## 2022-09-27 RX ORDER — VANCOMYCIN HYDROCHLORIDE 1 G/200ML
1000 INJECTION, SOLUTION INTRAVENOUS ONCE
Status: COMPLETED | OUTPATIENT
Start: 2022-09-27 | End: 2022-09-27

## 2022-09-27 RX ORDER — PHENYLEPHRINE HCL IN 0.9% NACL 50MG/250ML
10-300 PLASTIC BAG, INJECTION (ML) INTRAVENOUS CONTINUOUS
Status: DISCONTINUED | OUTPATIENT
Start: 2022-09-27 | End: 2022-09-28 | Stop reason: HOSPADM

## 2022-09-27 RX ORDER — ONDANSETRON 2 MG/ML
4 INJECTION INTRAMUSCULAR; INTRAVENOUS EVERY 6 HOURS PRN
Status: DISCONTINUED | OUTPATIENT
Start: 2022-09-27 | End: 2022-09-28 | Stop reason: HOSPADM

## 2022-09-27 RX ORDER — HEPARIN SODIUM AND DEXTROSE 10000; 5 [USP'U]/100ML; G/100ML
5-30 INJECTION INTRAVENOUS CONTINUOUS
Status: DISCONTINUED | OUTPATIENT
Start: 2022-09-27 | End: 2022-09-28

## 2022-09-27 RX ORDER — HEPARIN SODIUM 1000 [USP'U]/ML
60 INJECTION, SOLUTION INTRAVENOUS; SUBCUTANEOUS ONCE
Status: CANCELLED | OUTPATIENT
Start: 2022-09-27 | End: 2022-09-27

## 2022-09-27 RX ORDER — POLYETHYLENE GLYCOL 3350 17 G/17G
17 POWDER, FOR SOLUTION ORAL DAILY PRN
Status: DISCONTINUED | OUTPATIENT
Start: 2022-09-27 | End: 2022-09-28 | Stop reason: HOSPADM

## 2022-09-27 RX ORDER — SODIUM CHLORIDE 0.9 % (FLUSH) 0.9 %
5-40 SYRINGE (ML) INJECTION PRN
Status: DISCONTINUED | OUTPATIENT
Start: 2022-09-27 | End: 2022-09-28 | Stop reason: HOSPADM

## 2022-09-27 RX ORDER — BUMETANIDE 1 MG/1
1 TABLET ORAL DAILY
COMMUNITY

## 2022-09-27 RX ORDER — NOREPINEPHRINE BIT/0.9 % NACL 16MG/250ML
5 INFUSION BOTTLE (ML) INTRAVENOUS CONTINUOUS
Status: DISCONTINUED | OUTPATIENT
Start: 2022-09-27 | End: 2022-09-27

## 2022-09-27 RX ADMIN — HYDROCORTISONE SODIUM SUCCINATE 100 MG: 100 INJECTION, POWDER, FOR SOLUTION INTRAMUSCULAR; INTRAVENOUS at 16:44

## 2022-09-27 RX ADMIN — SODIUM CHLORIDE 1000 ML: 9 INJECTION, SOLUTION INTRAVENOUS at 12:02

## 2022-09-27 RX ADMIN — HYDROCODONE BITARTRATE AND ACETAMINOPHEN 1 TABLET: 5; 325 TABLET ORAL at 13:55

## 2022-09-27 RX ADMIN — Medication 5 MCG/MIN: at 15:03

## 2022-09-27 RX ADMIN — MEROPENEM 1000 MG: 1 INJECTION, POWDER, FOR SOLUTION INTRAVENOUS at 13:59

## 2022-09-27 RX ADMIN — HYDROCORTISONE SODIUM SUCCINATE 50 MG: 100 INJECTION, POWDER, FOR SOLUTION INTRAMUSCULAR; INTRAVENOUS at 20:16

## 2022-09-27 RX ADMIN — SODIUM CHLORIDE 11.85 UNITS/HR: 9 INJECTION, SOLUTION INTRAVENOUS at 21:13

## 2022-09-27 RX ADMIN — SODIUM CHLORIDE, PRESERVATIVE FREE 40 MG: 5 INJECTION INTRAVENOUS at 20:16

## 2022-09-27 RX ADMIN — SODIUM CHLORIDE, POTASSIUM CHLORIDE, SODIUM LACTATE AND CALCIUM CHLORIDE 1000 ML: 600; 310; 30; 20 INJECTION, SOLUTION INTRAVENOUS at 20:10

## 2022-09-27 RX ADMIN — SODIUM CHLORIDE: 9 INJECTION, SOLUTION INTRAVENOUS at 19:18

## 2022-09-27 RX ADMIN — IOPAMIDOL 75 ML: 755 INJECTION, SOLUTION INTRAVENOUS at 13:40

## 2022-09-27 RX ADMIN — VANCOMYCIN HYDROCHLORIDE 1000 MG: 1 INJECTION, SOLUTION INTRAVENOUS at 14:39

## 2022-09-27 ASSESSMENT — ENCOUNTER SYMPTOMS
VOMITING: 1
TACHYPNEA: 1
COLOR CHANGE: 1
CHEST TIGHTNESS: 0
GASTROINTESTINAL NEGATIVE: 1
ABDOMINAL PAIN: 1
BACK PAIN: 0
NAUSEA: 1
SORE THROAT: 0
SHORTNESS OF BREATH: 0
RESPIRATORY NEGATIVE: 1

## 2022-09-27 ASSESSMENT — PAIN SCALES - GENERAL
PAINLEVEL_OUTOF10: 8
PAINLEVEL_OUTOF10: 10

## 2022-09-27 ASSESSMENT — PAIN - FUNCTIONAL ASSESSMENT
PAIN_FUNCTIONAL_ASSESSMENT: NONE - DENIES PAIN
PAIN_FUNCTIONAL_ASSESSMENT: 0-10

## 2022-09-27 ASSESSMENT — PAIN DESCRIPTION - LOCATION
LOCATION: FOOT
LOCATION: FOOT

## 2022-09-27 ASSESSMENT — PAIN DESCRIPTION - FREQUENCY: FREQUENCY: CONTINUOUS

## 2022-09-27 ASSESSMENT — PAIN DESCRIPTION - PAIN TYPE: TYPE: CHRONIC PAIN

## 2022-09-27 ASSESSMENT — PAIN DESCRIPTION - ORIENTATION
ORIENTATION: LEFT
ORIENTATION: LEFT

## 2022-09-27 NOTE — ED PROVIDER NOTES
[facility]  ED  Emergency Department Encounter  Emergency Medicine Resident     Pt Name:Alex Jean Baptiste  MRN: [id number]  [place] 1955  Date of evaluation: 9/27/22  PCP:  PROVIDER Destiney Moreno MD      [address] [place] [address]       Chief Complaint   Patient presents with    Hyperglycemia    Fatigue       HISTORY OF PRESENT ILLNESS  (Location/Symptom, Timing/Onset, Context/Setting, Quality, Duration, Modifying Factors, Severity.)      Cristofer Benites is a 79 y.o. male h/o A-fib on coumadin, diabetes, right BKA, CVA, and PEG tube. who presents via EMS with hyperglycemia, and altered mental status facility where he lives today. Per EMS report patient's blood sugars have been in the 400s for the past day, hypotensive en route. Upon arrival patient is AO x3-4. Patient states he has been nauseous and vomiting and notes abdominal discomfort. Denies modifying factors. Denies chest pain, shortness of breath, fever, chills. PAST MEDICAL / SURGICAL / SOCIAL / FAMILY HISTORY      has no past medical history on file. A. Fib, diabetes, right BKA, PEG tube     has no past surgical history on file.   PEG tube    Social History     Socioeconomic History    Marital status: Single     Spouse name: Not on file    Number of children: Not on file    Years of education: Not on file    Highest education level: Not on file   Occupational History    Not on file   Tobacco Use    Smoking status: Not on file    Smokeless tobacco: Not on file   Substance and Sexual Activity    Alcohol use: Not on file    Drug use: Not on file    Sexual activity: Not on file   Other Topics Concern    Not on file   Social History Narrative    Not on file     Social Determinants of Health     Financial Resource Strain: Not on file   Food Insecurity: Not on file   Transportation Needs: Not on file   Physical Activity: Not on file   Stress: Not on file   Social Connections: Not on file   Intimate Partner Violence: Not on file   Housing Stability: Not on file       No family history on file. Allergies:  Penicillins    Home Medications:  Prior to Admission medications    Medication Sig Start Date End Date Taking? Authorizing Provider   bumetanide (BUMEX) 1 MG tablet Take 1 mg by mouth daily   Yes Historical Provider, MD   ammonium lactate (AMLACTIN) 12 % cream Apply topically as needed for Dry Skin Apply topically as needed. Yes Historical Provider, MD   senna-docusate (PERICOLACE) 8.6-50 MG per tablet Take 1 tablet by mouth daily   Yes Historical Provider, MD   HYDROcodone-acetaminophen (NORCO) 5-325 MG per tablet Take 1 tablet by mouth every 6 hours as needed for Pain. Yes Historical Provider, MD   midodrine (PROAMATINE) 5 MG tablet Take 10 mg by mouth daily   Yes Historical Provider, MD   magnesium hydroxide (MILK OF MAGNESIA CONCENTRATE) 2400 MG/10ML SUSP Take 2,400 mg by mouth once as needed   Yes Historical Provider, MD   methylPREDNISolone (MEDROL DOSEPACK) 4 MG tablet Take 4 mg by mouth See Admin Instructions Take by mouth. Yes Historical Provider, MD   bisacodyl (DULCOLAX) 10 MG suppository Place 10 mg rectally daily   Yes Historical Provider, MD   spironolactone (ALDACTONE) 25 MG tablet Take 25 mg by mouth daily   Yes Historical Provider, MD   polyethylene glycol (GLYCOLAX) 17 GM/SCOOP powder 17 g by Per G Tube route daily   Yes Historical Provider, MD   warfarin (COUMADIN) 6 MG tablet Take 6 mg by mouth   Yes Historical Provider, MD       REVIEW OF SYSTEMS    (2-9 systems for level 4, 10 or more for level 5)      Review of Systems   Constitutional:  Negative for chills and fever. HENT:  Negative for congestion and sore throat. Eyes:  Negative for visual disturbance. Respiratory:  Negative for chest tightness and shortness of breath. Cardiovascular:  Negative for chest pain. Gastrointestinal:  Positive for abdominal pain, nausea and vomiting. Genitourinary:  Negative for dysuria and flank pain.    Musculoskeletal:  Negative for back pain and joint swelling. Skin:  Positive for wound. Neurological:  Positive for weakness. Negative for headaches. PHYSICAL EXAM   (up to 7 for level 4, 8 or more for level 5)      INITIAL VITALS:   BP 96/63   Pulse (!) 114   Temp 99.1 °F (37.3 °C) (Oral)   Resp 15   Ht 6' 3\" (1.905 m)   Wt 150 lb (68 kg)   SpO2 97%   BMI 18.75 kg/m²     Physical Exam  Vitals and nursing note reviewed. Constitutional:       General: He is not in acute distress. Appearance: He is ill-appearing. HENT:      Head: Normocephalic and atraumatic. Mouth/Throat:      Mouth: Mucous membranes are dry. Cardiovascular:      Rate and Rhythm: Tachycardia present. Heart sounds: Normal heart sounds. Pulmonary:      Effort: Pulmonary effort is normal.      Breath sounds: Normal breath sounds. No wheezing or rhonchi. Abdominal:      Palpations: Abdomen is soft. Comments: PEG tube in place   Musculoskeletal:      Comments: Patient with right lower extremity BKA. Patient has a desquamating wound over his left foot over the third fourth and fifth toes, with possible gangrene over fourth toe, poor pulses. Moves left lower extremity   Skin:     General: Skin is dry.    Psychiatric:         Mood and Affect: Mood normal.         Behavior: Behavior normal.       DIFFERENTIAL  DIAGNOSIS     PLAN (LABS / IMAGING / EKG):  Orders Placed This Encounter   Procedures    Culture, Blood 1    Culture, Blood 1    Culture, Urine    COVID-19, Rapid    Culture, Anaerobic and Aerobic    XR CHEST PORTABLE    CT HEAD WO CONTRAST    CT ABDOMEN PELVIS W IV CONTRAST Additional Contrast? None    XR FOOT LEFT (MIN 3 VIEWS)    VL LOWER EXTREMITY ARTERIAL SEGMENTAL PRESSURES W PPG    CBC with Auto Differential    CMP    Lactic Acid Now and in 2 Hours    TSH with Reflex    Ammonia (select if history of liver disease or alcohol abuse)    Urinalysis with Microscopic    APTT    Protime-INR    T4, Free    Vancomycin Level, Random    Troponin Lipase    Basic Metabolic Panel    Pulse Oximetry    Pharmacy to Dose: Vancomycin    Inpatient consult to Critical Care    Inpatient consult to Podiatry    Inpatient consult to Infectious Diseases    Inpatient consult to Vascular Surgery    POCT Glucose    POCT Glucose    POC Glucose Fingerstick    POC Glucose Fingerstick    EKG 12 Lead    Saline lock IV    ADMIT TO INPATIENT    ADMIT TO INPATIENT       MEDICATIONS ORDERED:  Orders Placed This Encounter   Medications    0.9 % sodium chloride bolus    0.9 % sodium chloride infusion    meropenem (MERREM) 1,000 mg in sodium chloride 0.9 % 100 mL IVPB (mini-bag)     Order Specific Question:   Antimicrobial Indications     Answer:   Skin and Soft Tissue Infection    vancomycin (VANCOCIN) 1000 mg in dextrose 5% 200 mL IVPB     Order Specific Question:   Antimicrobial Indications     Answer:   Sepsis of Unknown Etiology    vancomycin (VANCOCIN) intermittent dosing (placeholder)     Order Specific Question:   Antimicrobial Indications     Answer:   Sepsis of Unknown Etiology     Order Specific Question:   Sepsis duration of therapy     Answer:   7 days    HYDROcodone-acetaminophen (NORCO) 5-325 MG per tablet 1 tablet    iopamidol (ISOVUE-370) 76 % injection 75 mL    norepinephrine (LEVOPHED) 16 mg in sodium chloride 0.9 % 250 mL infusion     Order Specific Question:   Titrate Infusion? Answer:   No     Order Specific Question:   Infusion Dose: Answer:   5 mcg/min    hydrocortisone sodium succinate PF (SOLU-CORTEF) injection 100 mg    lactated ringers bolus    0.9 % sodium chloride infusion       DDX: sepsis, MI, stroke    DIAGNOSTIC RESULTS / EMERGENCY DEPARTMENT COURSE / MDM   LAB RESULTS:  Results for orders placed or performed during the hospital encounter of 09/27/22   Culture, Blood 1    Specimen: Blood   Result Value Ref Range    Specimen Description . BLOOD     Special Requests LFA 10ML     Culture NO GROWTH <24 HRS    Culture, Blood 1    Specimen: Blood Result Value Ref Range    Specimen Description . BLOOD     Special Requests R FA 10ML     Culture NO GROWTH <24 HRS    COVID-19, Rapid    Specimen: Nasopharyngeal Swab   Result Value Ref Range    Specimen Description . NASOPHARYNGEAL SWAB     SARS-CoV-2, Rapid Not Detected Not Detected   CBC with Auto Differential   Result Value Ref Range    WBC 23.9 (H) 3.5 - 11.3 k/uL    RBC 3.06 (L) 4.21 - 5.77 m/uL    Hemoglobin 9.6 (L) 13.0 - 17.0 g/dL    Hematocrit 28.7 (L) 40.7 - 50.3 %    MCV 93.8 82.6 - 102.9 fL    MCH 31.4 25.2 - 33.5 pg    MCHC 33.4 28.4 - 34.8 g/dL    RDW 13.5 11.8 - 14.4 %    Platelets 375 692 - 640 k/uL    MPV 10.6 8.1 - 13.5 fL    NRBC Automated 0.0 0.0 per 100 WBC    Immature Granulocytes 0 0 %    Seg Neutrophils 90 (H) 36 - 66 %    Lymphocytes 2 (L) 24 - 44 %    Monocytes 8 (H) 1 - 7 %    Eosinophils % 0 (L) 1 - 4 %    Basophils 0 0 - 2 %    Absolute Immature Granulocyte 0.00 0.00 - 0.30 k/uL    Segs Absolute 21.51 (H) 1.8 - 7.7 k/uL    Absolute Lymph # 0.48 (L) 1.0 - 4.8 k/uL    Absolute Mono # 1.91 (H) 0.1 - 0.8 k/uL    Absolute Eos # 0.00 0.0 - 0.4 k/uL    Basophils Absolute 0.00 0.0 - 0.2 k/uL    Morphology INCREASED BANDS PRESENT     Morphology DOHLE BODIES PRESENT    CMP   Result Value Ref Range    Glucose 407 (HH) 70 - 99 mg/dL    BUN 56 (H) 8 - 23 mg/dL    Creatinine 2.04 (H) 0.70 - 1.20 mg/dL    Calcium 8.4 (L) 8.6 - 10.4 mg/dL    Sodium 124 (L) 135 - 144 mmol/L    Potassium 4.8 3.7 - 5.3 mmol/L    Chloride 86 (L) 98 - 107 mmol/L    CO2 25 20 - 31 mmol/L    Anion Gap 13 9 - 17 mmol/L    Alkaline Phosphatase 119 40 - 129 U/L    ALT 9 5 - 41 U/L    AST 14 <40 U/L    Total Bilirubin 0.4 0.3 - 1.2 mg/dL    Total Protein 6.6 6.4 - 8.3 g/dL    Albumin 2.9 (L) 3.5 - 5.2 g/dL    Albumin/Globulin Ratio 0.8 (L) 1.0 - 2.5    GFR Non- 33 (L) >60 mL/min    GFR African American 40 (L) >60 mL/min    GFR Comment         Lactic Acid Now and in 2 Hours   Result Value Ref Range    Lactic Acid, Whole Blood 2.7 (H) 0.7 - 2.1 mmol/L   Lactic Acid Now and in 2 Hours   Result Value Ref Range    Lactic Acid, Whole Blood 1.9 0.7 - 2.1 mmol/L   TSH with Reflex   Result Value Ref Range    TSH 0.16 (L) 0.30 - 5.00 uIU/mL   Ammonia (select if history of liver disease or alcohol abuse)   Result Value Ref Range    Ammonia 20 16 - 60 umol/L   APTT   Result Value Ref Range    PTT 31.3 (H) 20.5 - 30.5 sec   Protime-INR   Result Value Ref Range    Protime 15.9 (H) 9.1 - 12.3 sec    INR 1.6    T4, Free   Result Value Ref Range    Thyroxine, Free 1.60 0.93 - 1.70 ng/dL   Troponin   Result Value Ref Range    Troponin, High Sensitivity 120 (HH) 0 - 22 ng/L   Lipase   Result Value Ref Range    Lipase 8 (L) 13 - 60 U/L   POCT Glucose   Result Value Ref Range    Glucose 396 mg/dL    QC OK? yes    POCT Glucose   Result Value Ref Range    Glucose 356 mg/dL    QC OK? yes    POC Glucose Fingerstick   Result Value Ref Range    POC Glucose 396 (H) 75 - 110 mg/dL   POC Glucose Fingerstick   Result Value Ref Range    POC Glucose 356 (H) 75 - 110 mg/dL       IMPRESSION: Septic shock, osteomyelitis of left foot, pneumonia    RADIOLOGY:  CT ABDOMEN PELVIS W IV CONTRAST Additional Contrast? None   Final Result   Massive stool ball is noted within the colon and rectum. The stool bar exerts   significant mass effect on the prostate gland and urinary bladder. Bilateral fat containing inguinal hernias. Consolidative changes of the right lower lobe which does not have typical   appearance of atelectasis and may represent pneumonia. CT HEAD WO CONTRAST   Final Result      No evidence for acute intracranial hemorrhage, territorial infarction or   intracranial mass lesion. Moderate chronic microangiopathic ischemic disease. Chronic encephalomalacia   involving the anterior aspect of right corona radiata. Chronic   encephalomalacia involving right medial occipital lobe. Mild generalized volume loss.          XR CHEST PORTABLE   Final Result   1. No acute process. 2. Suspected small nodule of the right upper lung. Recommend nonemergent   noncontrast chest CT. XR FOOT LEFT (MIN 3 VIEWS)   Final Result   Findings suspicious for osteomyelitis of the middle phalanx of the small toe. VL LOWER EXTREMITY ARTERIAL SEGMENTAL PRESSURES W PPG    (Results Pending)         EKG  Normal sinus rhythm, was tachycardic 127  No acute ST or T wave changes, normal axis    All EKG's are interpreted by the Emergency Department Physician who either signs or Co-signs this chart in the absence of a cardiologist.    EMERGENCY DEPARTMENT COURSE:  Patient's initial blood pressures as low as 63 systolic. ED Course as of 09/27/22 1903   Tue Sep 27, 2022   1222 Patient hypotensive and tachycardic upon arrival.  Mentating well. Initial blood glucose 365. [TD]   1223 EKG shows sinus tachycardia 127 bpm.  . . No acute ST or T wave changes [TD]   1228 Patient given half liter in route to the ED. 30 cc/kg bolus started while in the ED. [TD]   1300 patient's white count 23.9. [TD]   1314 Meropenem and vancomycin started due to penicillin allergy [TD]   1318 Outside records showing EF of 15% with midodrine on meds list.  Will hold fluids for now. Patient having pain due to foot. Home [place] ordered [TD]   1400 5 mcg epinephrine started due to continued blood pressure in the 25Y systolic [TD]   8752 Given dose of hydrocortisone for continued hypotension and h/o multiple short doses of steroids at nursing facilty [TD]   1534 Spoke with ICU physician who will accept the patient. We will consult podiatry regarding the patient's left foot osteomyelitis.  [TD]      ED Course User Index  [TD] Patricio Hawkins DO     Sepsis Times and Checklist  Vital Signs: BP: 96/63  Heart Rate: (!) 114  Resp: 15  Temp: 99.1 °F (37.3 °C) SpO2: 97 %  SIRS (>2) Non Pregnant       Temp > 38.3C or < 36C   HR > 90   RR > 20   WBC > 12 or < 4 or >10% bands  SIRS (>2) Pregnant 20 weeks until 3 days postpartum   Temp > 38C or <36C   HR >110   RR > 24   WBC >15 or < 4 or >10% bands   SIRS (>2) and confirmed or suspected source of infection = Sepsis  Is Sepsis due to likely bacterial infection?: Yes      Sepsis Identified:  Date: 9/27/22   Time: 1300  Sepsis Orders:   CBC(required): Yes   CMP: Yes   PT/PTT: Yes   Blood Cultures x2(required): Yes   Urinalysis and Urine Culture: Yes   Lactate(required): Yes   Antibiotics Given (within 3 hours of sepsis identification, after blood cultures):  Yes    (If unable to obtain IV access for IV antibiotics within 3 hours of identification of sepsis, IM antibiotics is acceptable.)              If lactate >2.0 MUST repeat within 6 hours    If elevated, is elevated lactate from a likely infectious source?: Yes. IV Fluid Bolus:  Is lactate > 4.0:  No  If lactate >  4.0 OR hypotension (MAP<65 mmHg,BP<90 or SBP<85 pregnancy 20 weeks to 3 days  postpartum) (with 2 BP readings) 30 ml/kg crystalloid MUST be ordered. If 30 ml/kg crystalloid not ordered the following must be documented in the medical record:  Administration of 30 mL/kg of crystalloid fluids would be detrimental or harmful for the patient;   AND that the patient has one of the following conditions, OR that a portion of the crystalloid fluid volume was administered as colloids (if a portion consisted of colloids, there must be an order and documentation that colloids were started or noted as given); Heart Failure. (If a portion consisted of colloids, there must be an order and documentation that colloids were started or noted as given.)   the volume of crystalloid fluids in place of 30 mL/kg the patient was to receive is 1.5L, (Order for this amount of fluid must be placed)     Fluids must be initiated within 3 hours of sepsis identification. These fluids must have a rate > 125 ml/hr.     Is the patient Morbidly Obese (BMI > 30): No  IV Fluids given prior to arrival can be used in this calculation but the following information must be documented:  Start time: 1230, Type of fluid normal saline, Volume of fluid 1L, and Rate (Duration or End time) 1320. Does the patient or patient advocate refuse the entire 30 ml/kg IV fluid bolus? The Patient /Patient Advocate is refusing the entire 30 ml/kg IV fluid bolus      Septic Shock Identified (Initial lactate > 4.0 or 2 Hypotensive Blood Pressure readings within the first 6 hours): For septic shock sepsis focus physical exam must be completed AND documented (within 6 hours). Date: 9/27/2022 Time: 1400      Sepsis focus exam completed. For persistent hypotension after 30ml/kg fluid bolus vasopressors must be started (within 6 hours)     No notes of EC Admission Criteria type on file. CONSULTS:  PHARMACY TO DOSE VANCOMYCIN  IP CONSULT TO CRITICAL CARE  IP CONSULT TO PODIATRY  IP CONSULT TO INFECTIOUS DISEASES  IP CONSULT TO VASCULAR SURGERY  IP CONSULT TO INFECTIOUS DISEASES    CRITICAL CARE:  20 minutes    FINAL IMPRESSION      1. Septic shock ([address]    2. Foot infection    3. Pneumonia of right lower lobe due to infectious organism          DISPOSITION / PLAN     DISPOSITION Admitted 09/27/2022 03:42:12 PM      PATIENT REFERRED TO:  No follow-up provider specified.     DISCHARGE MEDICATIONS:  New Prescriptions    No medications on file       Patricio Hawkins DO  Emergency Medicine Resident    (Please note that portions of thisnote were completed with a voice recognition program.  Efforts were made to edit the dictations but occasionally words are mis-transcribed.)      Patricio Hawkins DO  Resident  09/27/22 1910

## 2022-09-27 NOTE — CARE COORDINATION
09/27/22 9659   Service Assessment   Patient Orientation Alert and Oriented   Cognition Alert   History Provided By Patient   Social/Functional History   Type of Home Facility   ADL Assistance Needs assistance   Toileting Needs assistance   Homemaking Assistance Needs assistance   Homemaking Responsibilities No   Ambulation Assistance Needs assistance   Transfer Assistance Needs assistance   Active  No   Discharge Planning   Type of Residence Long-Term Care   Potential Assistance Needed Transportation; Extended Care Facility   Patient expects to be discharged to: Long-term care   Condition of Participation: Discharge Planning   The Plan for Transition of Care is related to the following treatment goals: increased comfort level   Return to [facility]

## 2022-09-27 NOTE — ED NOTES
Patient back in room from [address] via [address] [place] [address]  09/27/22 0457

## 2022-09-27 NOTE — ED TRIAGE NOTES
Patient arrives from [facility] via [facility]. Patient has not been feeling well for 2 days, nausea/vomiting and hyperglycemic. Upon patient arrival patient's blood sugar is 396. Patient presents with blood pressure of 69/50 and heart rate of 130bpm. Patient has a hx of diabetes, hypertension, high cholesterol, and Afib. Patient received 500mL fluid bolus PTA. Patient states his left foot is causing him pain and has right sided below the knee amputation which he states is from a previous infection. Patient also has a feeding tube in place. Patient is alert and oriented x4.

## 2022-09-27 NOTE — ED PROVIDER NOTES
[facility] [address] [facility]     Emergency Department     Faculty Attestation        I performed a history and physical examination of the patient and discussed management with the resident. I reviewed the residents note and agree with the documented findings and plan of care. Any areas of disagreement are noted on the chart. I was personally present for the key portions of any procedures. I have documented in the chart those procedures where I was not present during the key portions. I have reviewed the emergency nurses triage note. I agree with the chief complaint, past medical history, past surgical history, allergies, medications, social and family history as documented unless otherwise noted below. For Physician Assistant/ Nurse Practitioner cases/documentation I have personally evaluated this patient and have completed at least one if not all key elements of the E/M (history, physical exam, and MDM). Additional findings are as noted. Vital Signs: BP: (!) 81/55  Heart Rate: (!) 128  Resp: 12  Temp: 99.1 °F (37.3 °C) SpO2: 93 %  PCP:  No primary care provider on file. Pertinent Comments:     Patient is a 66-year-old male with clearly history of diabetes as well as atrial fibrillation on Coumadin. Patient also has had surgery and infection on the left lower extremity/foot that he is being treated for at [facility]. Patient has had nausea and vomiting at the facility as well as hypotension and referred here. This is been going on for the last 2 days apparently. Here patient is actually rather alert and oriented able to answer questions well and admits to history of atrial fibrillation. Is slightly confused believing the year is 2020 however.    Denies headache or neck pain and denies chest pain or shortness of breath but does have abdominal pain that appears to be periumbilical.   Patient's left foot is undressed and appears to have some purulence and possible source of infection. Patient appears to be mildly tachycardic but appears to be sinus at this time however cannot rule out intermittent atrial fibrillation. Initial blood pressure is 60 and now up to 85 mmHg after 1.5 L of fluid. Initially unable to find previous records at [address] [place] [address] where most of his care is at. Patient's records finally found and does have an EF of 10 to 15% therefore fluids were stopped before full 30 cc/kg bolus administered. White count of 20,000 and empiric antibiotic started including vancomycin and meropenem secondary to penicillin allergy. Initial lactate elevated at 2.7 will repeat later after appropriate time. Also awaiting stat CT of the head as well as CTA of the abdomen/pelvis. Also of note patient with no obvious history of hypertension and is on daily midodrine. Patient's blood pressure was stable in the mid 80s millimeters mercury however now has drifted back into the 70s. Will add on low-dose Levophed. Also after review of patient's medications appears to be on daily steroid, low-dose. Will therefore give stress dose of hydrocortisone given possible relative adrenal insufficiency under this stressed state. EKG Interpretation    Interpreted by emergency department physician    Rhythm: Sinus rhythm, but Tachycardic  Rate: Tachycardic at 127 bpm  Axis: normal  Conduction: normal  ST Segments: no acute change  T Waves: no acute change  Q Waves: no acute change    Clinical Impression: Sinus Tachycardia. CRITICAL CARE: There was a high probability of clinically significant/life threatening deterioration in this patient's condition which required my urgent intervention. Total critical care time was 30 minutes. This excludes any time for separately reportable procedures.          (Please note that portions of this note were completed with a voice recognition program. Efforts were made to edit the dictations but occasionally words are mis-transcribed.  Whenever words are used in this note in any gender, they shall be construed as though they were used in the gender appropriate to the circumstances; and whenever words are used in this note in the singular or plural form, they shall be construed as though they were used in the form appropriate to the circumstances.)    MD Yin Cheek  Attending Emergency Medicine Physician           Denzel Andrade MD  09/27/22 [address], [place]  09/27/22 1334       Denzel Andrade MD  09/27/22 1728

## 2022-09-27 NOTE — CONSULTS
Consultation Note  Podiatric Medicine and Surgery     Subjective     Chief Complaint: Left foot pain    HPI:  Jannette Veloz is a 79 y.o. male seen at [facility]'s ED for left foot pain with discoloration to his left foot. Patient is staying at a facility where they noticed the discoloration a few days ago. Upon getting the ED patient had a episode of hyperglycemia and altered mentation. 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 Upon arrival to the ER patient was initially worked up for concern of sepsis. Patient states that he has seen a vascular surgeon with [facility] and they had planned to work on his left lower extremity. Patient currently states that he has shakes, fatigue. Constitutional symptoms with feverish and chills. PCP is PROVIDER MD DIMITRI    ROS:   Review of Systems   Constitutional:  Positive for chills and fatigue. HENT: Negative. Respiratory: Negative. Cardiovascular: Negative. Gastrointestinal: Negative. Endocrine: Negative. Musculoskeletal:  Positive for myalgias. Skin:  Positive for color change, pallor, rash and wound. Neurological:  Positive for tremors and weakness. Past Medical History   has no past medical history on file. Past Surgical History   has no past surgical history on file. Medications  Prior to Admission medications    Medication Sig Start Date End Date Taking? Authorizing Provider   bumetanide (BUMEX) 1 MG tablet Take 1 mg by mouth daily   Yes Historical Provider, MD   ammonium lactate (AMLACTIN) 12 % cream Apply topically as needed for Dry Skin Apply topically as needed. Yes Historical Provider, MD   senna-docusate (PERICOLACE) 8.6-50 MG per tablet Take 1 tablet by mouth daily   Yes Historical Provider, MD   HYDROcodone-acetaminophen (NORCO) 5-325 MG per tablet Take 1 tablet by mouth every 6 hours as needed for Pain.    Yes Historical Provider, MD   midodrine (PROAMATINE) 5 MG tablet Take 10 mg by mouth daily   Yes Historical Provider, MD   magnesium hydroxide (MILK OF MAGNESIA CONCENTRATE) 2400 MG/10ML SUSP Take 2,400 mg by mouth once as needed   Yes Historical Provider, MD   methylPREDNISolone (MEDROL DOSEPACK) 4 MG tablet Take 4 mg by mouth See Admin Instructions Take by mouth. Yes Historical Provider, MD   bisacodyl (DULCOLAX) 10 MG suppository Place 10 mg rectally daily   Yes Historical Provider, MD   spironolactone (ALDACTONE) 25 MG tablet Take 25 mg by mouth daily   Yes Historical Provider, MD   polyethylene glycol (GLYCOLAX) 17 GM/SCOOP powder 17 g by Per G Tube route daily   Yes Historical Provider, MD   warfarin (COUMADIN) 6 MG tablet Take 6 mg by mouth   Yes Historical Provider, MD    Scheduled Meds:   vancomycin (VANCOCIN) intermittent dosing (placeholder)   Other RX Placeholder    lactated ringers bolus  1,000 mL IntraVENous Once     Continuous Infusions:   norepinephrine 5 mcg/min (09/27/22 1503)    sodium chloride       PRN Meds:. HYDROcodone 5 mg - acetaminophen    Allergies  is allergic to penicillins. Family History  family history is not on file. Social History   has no history on file for tobacco use.   has no history on file for alcohol use.   has no history on file for drug use.     Objective     Vitals:  Patient Vitals for the past 8 hrs:   BP Temp Temp src Pulse Resp SpO2 Height Weight   09/27/22 1623 96/63 -- -- (!) 114 15 97 % -- --   09/27/22 1530 87/62 -- -- (!) 125 12 93 % -- --   09/27/22 1502 (!) 79/61 -- -- (!) 125 19 97 % -- --   09/27/22 1431 (!) 72/53 -- -- (!) 126 18 95 % -- --   09/27/22 1300 -- -- -- (!) 128 -- -- -- --   09/27/22 1259 89/63 -- -- (!) 128 25 95 % -- --   09/27/22 1232 81/61 -- -- (!) 117 19 94 % -- --   09/27/22 1214 (!) 63/47 -- -- (!) 128 21 94 % -- --   09/27/22 1204 -- 99.1 °F (37.3 °C) Oral -- -- -- -- --   09/27/22 1200 (!) 81/55 -- -- (!) 128 12 93 % 6' 3\" (1.905 m) 150 lb (68 kg)     Average, Min, and Max for last 24 hours Vitals:  TEMPERATURE:  Temp  Av.1 °F (37.3 °C)  Min: 99.1 °F (37.3 °C)  Max: 99.1 °F (37.3 °C)    RESPIRATIONS RANGE: Resp  Av.6  Min: 12  Max: 25    PULSE RANGE: Pulse  Av.3  Min: 114  Max: 128    BLOOD PRESSURE RANGE:  Systolic (93FRE), PFE:84 , Min:63 , BJT:85   ; Diastolic (90BBZ), QHS:16, Min:47, Max:63      PULSE OXIMETRY RANGE: SpO2  Av.8 %  Min: 93 %  Max: 97 %  I&O:  No intake/output data recorded. CBC:  Recent Labs     22  1230   WBC 23.9*   HGB 9.6*   HCT 28.7*           BMP:  Recent Labs     22  1218 22  1230 22  1421   NA  --  124*  --    K  --  4.8  --    CL  --  86*  --    CO2  --  25  --    BUN  --  56*  --    CREATININE  --  2.04*  --    GLUCOSE 396 407* 356   CALCIUM  --  8.4*  --         Coags:  Recent Labs     22  1230   APTT 31.3*   PROT 6.6   INR 1.6       No results found for: LABA1C  No results found for: SEDRATE  No results found for: CRP      Physical Exam:  Vascular: DP and PT pulses are nonpalpable, nondopplarable. CFT > 3 seconds  brisk to all digits. Hair growth is absent to the level of the digits. No edema. Dusky discoloration dorsal midfoot w gangrenous changes. Neuro: Saph/sural/SP/DP/plantar sensation intact to light touch. Patient shaking left leg during examination. Musculoskeletal: Muscle strength is 4/5 to all lower extremity muscle groups. Gross deformity is present to right lower extremity with BKA. POP to left calcaneus. Compartments are non-compressible due to small size of lower extremity. No pain with compression of posterior calf. Dermatologic: Necrosis to left fourth digit with discoloration along the plantar pulp of the metatarsal heads medially with fluctuance. .  Atrophic and sloughing of the plantar aspect of the fourth digit. Dorsally necrosis migrating proximally along the dorsal aspect of the midfoot. Fourth digit does not probe to bone, sinus track, or undermine.   An attempt to probe difficult to break skin. No expressible purulence seen. Significant webspace maceration. Dry eschar lateral aspect fifth digit. See clinical images below      Clinical:             Imaging:   CT ABDOMEN PELVIS W IV CONTRAST Additional Contrast? None   Final Result   Massive stool ball is noted within the colon and rectum. The stool bar exerts   significant mass effect on the prostate gland and urinary bladder. Bilateral fat containing inguinal hernias. Consolidative changes of the right lower lobe which does not have typical   appearance of atelectasis and may represent pneumonia. CT HEAD WO CONTRAST   Final Result      No evidence for acute intracranial hemorrhage, territorial infarction or   intracranial mass lesion. Moderate chronic microangiopathic ischemic disease. Chronic encephalomalacia   involving the anterior aspect of right corona radiata. Chronic   encephalomalacia involving right medial occipital lobe. Mild generalized volume loss. XR CHEST PORTABLE   Final Result   1. No acute process. 2. Suspected small nodule of the right upper lung. Recommend nonemergent   noncontrast chest CT. XR FOOT LEFT (MIN 3 VIEWS)   Final Result   Findings suspicious for osteomyelitis of the middle phalanx of the small toe. VL LOWER EXTREMITY ARTERIAL SEGMENTAL PRESSURES W PPG    (Results Pending)       Cultures:   2x plantar L foot aerobic and anaerobic    Assessment     Elizabeth Robb is a 79 y.o. male with   Wet gangrene with necrosis, fourth digit left foot  PAD, left    Principal Problem:    Sepsis ([address]  Active Problems:    Septic shock ([address]  Resolved Problems:    * No resolved hospital problems. *        Plan     Patient examined and evaluated at bedside. Treatment options discussed in detail with the patient. Plain film radiographs ordered. Findings discussed in detail with the patient.   Findings suspicious for osteomyelitis of the middle phalanx of the small fifth digit  MRI left LE ordered to rule out deep abscess/osteomyelitis. Extensive discussion of x-ray findings, in conjunction with review of medical charts and the lack of blood flow to foot via Doppler examination of his EXTR discussed with patient that there is a likelihood that he would lose some of his digits if not the entirety of his foot. Explained to patient that there is need for blood flow to reperfuse the area if it were to heal adequately. Patient is amenable to treatment plan. Appreciate ID recs  Abx: Meropenem, vancomycin  Aerobic anaerobic culture of fourth digit taken. Pending  Appreciate vascular recommendations. NIVS ordered due to diminished peripheral pulses and inability to to Doppler left foot and distal calf. No signs of venous or arterial flow via Doppler. No plan for podiatry to remove necrotic toe currently with risk of further wounds and no healing potential.   Mechanical debridement performed of the left foot down to and including superficial skin and sloughing tissue with necrosis. 100% of wound debrided followed by application of Betadine. Plan for admission to ICU for IV abx, medical management of sepsis per Internal Medicine. Dressing applied to Left foot: Betadine wet-to-dry gauze, DSD, Kerlix  NWB to Left lower extremity. Discussed with   [facility] [place]. Alba Smith DPM   Podiatric Medicine & Surgery   9/27/2022 at 7:05 PM      This note is created with the assistance of a speech recognition program.  While intending to generate a document that actually reflects the content of the visit, the document can still have some errors including those of syntax and sound a like substitutions which may escape proof reading. In such instances, actual meaning can be extrapolated by contextual diversion.

## 2022-09-27 NOTE — H&P
Critical Care - History and Physical Examination    Patient's name:  Anabelle Moreno  Medical Record Number: [id number]  Patient's account/billing number: [de-identified]  Patient's YOB: 1955  Age: 79 y.o. Date of Admission: 9/27/2022 11:58 AM  Reason of ICU admission:   Date of History and Physical Examination: 9/27/2022      Primary Care Physician: Bruna Ramos MD  Attending Physician:    Code Status: No Order    Chief complaint: AMS, Hypotension, Hyperglycemia    Reason for ICU admission: Septic shock      History Of Present Illness:   History was obtained from chart review and the patient. Anabelle Moreno is a 79 y.o. who has been brought to ER from nursing facility for hypotension, hyperglycemia and some altered mentation. Patient has past medical history of recent right BKA for severe peripheral vascular disease,HFrEF (dilated cardiomyopathy) with EF 10 to 15%, recent ischemic stroke with hemorrhagic transformation, oropharyngeal dysphagia status post PEG tube placement, type 2 diabetes mellitus. On arrival to ER, patient was hypotensive, and tachycardic with concern of sepsis. Initial lab work-up showed leukocyte count of 23, creatinine 2.04, BUN 56, blood glucose 407, high-sensitivity troponins 120. Patient was given 1.5 L IV fluids with some improvement in blood pressure but his MAP dropped again and he started requiring Levophed. EKG showed concern of irregularly irregular rhythm. Unclear if patient has history of A. fib but it seems like at one point patient was on Coumadin but I am not sure if that was for severe peripheral arterial disease. Patient has chronic nonhealing left foot wound. X-ray left foot showing osteomyelitis. Patient has a PEG tube likely after recent oropharyngeal dysphagia related to stroke.   Important note that patient also received IV contrast in the ER for CT abdomen and pelvis which showed massive stool burden in the colon and rectum with concern of significant mass-effect on prostate and urinary bladder. There is also consolidative changes of right lower lobe of lung which could be related to pneumonia. Past Medical History:  No past medical history on file. Past Surgical History:  No past surgical history on file. Allergies: Allergies   Allergen Reactions    Penicillins          Home Medications:   Prior to Admission medications    Medication Sig Start Date End Date Taking? Authorizing Provider   bumetanide (BUMEX) 1 MG tablet Take 1 mg by mouth daily   Yes Historical Provider, MD   ammonium lactate (AMLACTIN) 12 % cream Apply topically as needed for Dry Skin Apply topically as needed. Yes Historical Provider, MD   senna-docusate (PERICOLACE) 8.6-50 MG per tablet Take 1 tablet by mouth daily   Yes Historical Provider, MD   HYDROcodone-acetaminophen (NORCO) 5-325 MG per tablet Take 1 tablet by mouth every 6 hours as needed for Pain. Yes Historical Provider, MD   midodrine (PROAMATINE) 5 MG tablet Take 10 mg by mouth daily   Yes Historical Provider, MD   magnesium hydroxide (MILK OF MAGNESIA CONCENTRATE) 2400 MG/10ML SUSP Take 2,400 mg by mouth once as needed   Yes Historical Provider, MD   methylPREDNISolone (MEDROL DOSEPACK) 4 MG tablet Take 4 mg by mouth See Admin Instructions Take by mouth. Yes Historical Provider, MD   bisacodyl (DULCOLAX) 10 MG suppository Place 10 mg rectally daily   Yes Historical Provider, MD   spironolactone (ALDACTONE) 25 MG tablet Take 25 mg by mouth daily   Yes Historical Provider, MD   polyethylene glycol (GLYCOLAX) 17 GM/SCOOP powder 17 g by Per G Tube route daily   Yes Historical Provider, MD   warfarin (COUMADIN) 6 MG tablet Take 6 mg by mouth   Yes Historical Provider, MD       Social History:   TOBACCO:   has no history on file for tobacco use. ETOH:   has no history on file for alcohol use. DRUGS:  has no history on file for drug use.   OCCUPATION:      Family History:   No family history on file.        REVIEW OF SYSTEMS (ROS):  General ROS: appears sick  Ophthalmic ROS: negative  ENT: negative  Hematological and Lymphatic ROS: negative  Endocrine ROS: negative  Breast ROS: negative  Respiratory ROS: no cough, shortness of breath, or wheezing  Cardiovascular ROS: Tachycardia  Gastrointestinal ROS:negative  Genito-Urinary ROS: negative  Musculoskeletal ROS: Chronic nonhealing left foot wound  Neurological ROS: negative  Dermatological ROS: Left foot wound      Physical Exam:    Vitals: BP 87/62   Pulse (!) 125   Temp 99.1 °F (37.3 °C) (Oral)   Resp 12   Ht 6' 3\" (1.905 m)   Wt 150 lb (68 kg)   SpO2 93%   BMI 18.75 kg/m²     Body weight:   Wt Readings from Last 3 Encounters:   09/27/22 150 lb (68 kg)       Body Mass Index : Body mass index is 18.75 kg/m². PHYSICAL EXAMINATION :  Constitutional: Appears sick, moderate distress  EENT: PERRLA, EOMI, sclera clear, anicteric, oropharynx clear, no lesions, neck supple with midline trachea. Neck: Supple, symmetrical, trachea midline, no adenopathy, thyroid symmetric, no jvd skin normal  Respiratory: clear to auscultation, no wheezes or rales and unlabored breathing. No intercostal tenderness  Cardiovascular: regular rate and rhythm, normal S1, S2, no murmur noted and 2+ pulses throughout  Abdomen: soft, nontender, nondistended, no masses or organomegaly  Neurological: No focal deficit  Extremities: Right lower extremity BKA. Chronic nonhealing wound of left fourth, seems infected. Laboratory findings:-    CBC:   Recent Labs     09/27/22  1230   WBC 23.9*   HGB 9.6*        BMP:    Recent Labs     09/27/22  1230 09/27/22  1421   *  --    K 4.8  --    CL 86*  --    CO2 25  --    BUN 56*  --    CREATININE 2.04*  --    GLUCOSE 407* 356     S. Calcium:  Recent Labs     09/27/22  1230   CALCIUM 8.4*     S. Ionized Calcium:No results for input(s): IONCA in the last 72 hours.   S. Magnesium:No results for input(s): MG in the last 72 hours.  S. Phosphorus:No results for input(s): PHOS in the last 72 hours. S. Glucose:  Recent Labs     09/27/22  1203 09/27/22  1412   POCGLU 396* 356*     Glycosylated hemoglobin A1C: No results for input(s): LABA1C in the last 72 hours. INR:   Recent Labs     09/27/22  1230   INR 1.6     Hepatic functions:   Recent Labs     09/27/22  1230   ALKPHOS 119   ALT 9   AST 14   PROT 6.6   BILITOT 0.4   LABALBU 2.9*     Pancreatic functions:No results for input(s): LACTA, AMYLASE in the last 72 hours. S. Lactic Acid: No results for input(s): LACTA in the last 72 hours. Cardiac enzymes:No results for input(s): CKTOTAL, CKMB, CKMBINDEX, TROPONINI in the last 72 hours. BNP:No results for input(s): BNP in the last 72 hours. Lipid profile: No results for input(s): CHOL, TRIG, HDL, LDL, LDLCALC in the last 72 hours.   Blood Gases: No results found for: PH, PCO2, PO2, HCO3, O2SAT  Thyroid functions:   Lab Results   Component Value Date/Time    TSH 0.16 09/27/2022 12:30 PM        Urinalysis:     Microbiology:  Cultures during this admission:     Blood cultures:                 [] None drawn      [] Negative             []  Positive (Details:  )  Urine Culture:                   [] None drawn      [] Negative             []  Positive (Details:  )  Sputum Culture:               [] None drawn       [] Negative             []  Positive (Details:  )   Endotracheal aspirate:     [] None drawn       [] Negative             []  Positive (Details:  )         -----------------------------------------------------------------  Radiological reports:    CXR:    Electrocardiogram:     Last Echocardiogram findings:          Assessment and Plan     Patient Active Problem List   Diagnosis    Sepsis ([facility]    Septic shock ([facility]         Additional assessment:  59-year-old male who presented with hypotension, altered mentation and hyperglycemia is requiring admission to medical ICU for septic shock with the source from left foot osteomyelitis. Plan:  Neuro:  Recent ischemic stroke with hemorrhagic transformation  CT head negative for any intracranial abnormality  Sedation: None  Pain control: Oxycodone as needed    Resp:  Chest x-ray negative for vascular congestion/edema  Supplemental oxygen as needed  Keep SPO2> 92%  Monitor respiratory status while hydrating    CV:  History of HFrEF with EF 10-15% (likely dilated cardiomyopathy) on ECHO 1/2022 - on Coreg, Entresto, Aldactone  Unclear if history of A. Fib? Elevated high-sensitivity troponins likely type II NSTEMI-trend troponins at this point  Septic shock -source left foot osteomyelitis  Switch vasopressors to Sigifredo-Synephrine due to tachycardia  Obtain blood cultures x2  Obtain urine cultures  Hold home medications including diuretics  Hydration according to volume status at this point considering underlying sepsis  GI/Nutrition:  History of oropharyngeal dysphagia status post PEG tube placement in the recent past  All medications through PEG tube  Can do bedside swallow study and see if patient has ability to eat as well without choking  Huge stool burden on CT scan  Start laxative, consider enema if persistent constipation    /Fluids/Electrolytes:  Acute kidney injury  Patient did receive IV contrast in the ER  Expect creatinine to evolve at this point  Place Harris's catheter for I/Os monitoring    Heme:  Leukocytosis-WBC 23  Hemoglobin 9.6  Platelet count 133    ID:  Left foot osteomyelitis  Antimicrobials: Cefepime and vancomycin  Infectious disease consultation    Endo:  Type 2 diabetes mellitus  Uncontrolled blood glucose  Start insulin drip for target blood glucose 140-180  Hypoglycemia protocol    MSK:  Left foot osteomyelitis  Chronic nonhealing left foot wound  Podiatry evaluation    Skin:  Left foot wound    Prophylaxis:  DVT: Heparin  GI: Protonix    Dispo:   To be admitted to MICU      Parish Nayak MD   Internal Medicine - PGY-3  Intensive Care Unit  9/27/2022, 4:00 PM

## 2022-09-27 NOTE — ED PROVIDER NOTES
[facility] [address] [facility]  Emergency Department  Emergency Medicine Resident Sign-out     Care of Renato Mo was assumed from Dr. Roland Macdonald and is being seen for Hyperglycemia and Fatigue  . The patient's initial evaluation and plan have been discussed with the prior provider who initially evaluated the patient. EMERGENCY DEPARTMENT COURSE / MEDICAL DECISION MAKING:       MEDICATIONS GIVEN:  Orders Placed This Encounter   Medications    0.9 % sodium chloride bolus    0.9 % sodium chloride infusion    meropenem (MERREM) 1,000 mg in sodium chloride 0.9 % 100 mL IVPB (mini-bag)     Order Specific Question:   Antimicrobial Indications     Answer:   Skin and Soft Tissue Infection    vancomycin (VANCOCIN) 1000 mg in dextrose 5% 200 mL IVPB     Order Specific Question:   Antimicrobial Indications     Answer:   Sepsis of Unknown Etiology    vancomycin (VANCOCIN) intermittent dosing (placeholder)     Order Specific Question:   Antimicrobial Indications     Answer:   Sepsis of Unknown Etiology     Order Specific Question:   Sepsis duration of therapy     Answer:   7 days    HYDROcodone-acetaminophen (NORCO) 5-325 MG per tablet 1 tablet    iopamidol (ISOVUE-370) 76 % injection 75 mL    norepinephrine (LEVOPHED) 16 mg in sodium chloride 0.9 % 250 mL infusion     Order Specific Question:   Titrate Infusion? Answer:   No     Order Specific Question:   Infusion Dose:      Answer:   5 mcg/min    hydrocortisone sodium succinate PF (SOLU-CORTEF) injection 100 mg    lactated ringers bolus    0.9 % sodium chloride infusion       LABS / RADIOLOGY:     Labs Reviewed   CBC WITH AUTO DIFFERENTIAL - Abnormal; Notable for the following components:       Result Value    WBC 23.9 (*)     RBC 3.06 (*)     Hemoglobin 9.6 (*)     Hematocrit 28.7 (*)     Seg Neutrophils 90 (*)     Lymphocytes 2 (*)     Monocytes 8 (*)     Eosinophils % 0 (*)     Segs Absolute 21.51 (*)     Absolute Lymph # 0.48 (*)     Absolute Mono # 1.91 (*) All other components within normal limits   COMPREHENSIVE METABOLIC PANEL - Abnormal; Notable for the following components:    Glucose 407 (*)     BUN 56 (*)     Creatinine 2.04 (*)     Calcium 8.4 (*)     Sodium 124 (*)     Chloride 86 (*)     Albumin 2.9 (*)     Albumin/Globulin Ratio 0.8 (*)     GFR Non- 33 (*)     GFR  40 (*)     All other components within normal limits   LACTIC ACID - Abnormal; Notable for the following components:    Lactic Acid, Whole Blood 2.7 (*)     All other components within normal limits   TSH WITH REFLEX - Abnormal; Notable for the following components:    TSH 0.16 (*)     All other components within normal limits   APTT - Abnormal; Notable for the following components:    PTT 31.3 (*)     All other components within normal limits   PROTIME-INR - Abnormal; Notable for the following components:    Protime 15.9 (*)     All other components within normal limits   TROPONIN - Abnormal; Notable for the following components:    Troponin, High Sensitivity 120 (*)     All other components within normal limits   LIPASE - Abnormal; Notable for the following components:    Lipase 8 (*)     All other components within normal limits   POC GLUCOSE FINGERSTICK - Abnormal; Notable for the following components:    POC Glucose 396 (*)     All other components within normal limits   POC GLUCOSE FINGERSTICK - Abnormal; Notable for the following components:    POC Glucose 356 (*)     All other components within normal limits   POCT GLUCOSE - Normal   POCT GLUCOSE - Normal   CULTURE, BLOOD 1   CULTURE, BLOOD 1   COVID-19, RAPID   CULTURE, URINE   CULTURE, ANAEROBIC AND AEROBIC   LACTIC ACID   AMMONIA   T4, FREE   URINALYSIS WITH MICROSCOPIC   TROPONIN   BASIC METABOLIC PANEL   VANCOMYCIN LEVEL, RANDOM       XR FOOT LEFT (MIN 3 VIEWS)    Result Date: 9/27/2022  EXAMINATION: THREE XRAY VIEWS OF THE LEFT FOOT 9/27/2022 12:36 pm COMPARISON: None.  HISTORY: ORDERING SYSTEM PROVIDED HISTORY: wound, concern for osteomyelitis TECHNOLOGIST PROVIDED HISTORY: wound, concern for osteomyelitis FINDINGS: There is no evidence of acute fracture. There is normal alignment of the tarsometatarsal joints. No acute joint abnormality. Focal osteopenia and cortical thinning of the middle phalanx of the small toe. No focal soft tissue abnormality. Findings suspicious for osteomyelitis of the middle phalanx of the small toe. CT HEAD WO CONTRAST    Result Date: 9/27/2022  EXAMINATION: CT OF THE HEAD WITHOUT CONTRAST  9/27/2022 1:26 pm TECHNIQUE: CT of the head was performed without the administration of intravenous contrast. Automated exposure control, iterative reconstruction, and/or weight based adjustment of the mA/kV was utilized to reduce the radiation dose to as low as reasonably achievable. COMPARISON: None. HISTORY: ORDERING SYSTEM PROVIDED HISTORY: AMS TECHNOLOGIST PROVIDED HISTORY: AMS Decision Support Exception - unselect if not a suspected or confirmed emergency medical condition->Emergency Medical Condition (MA) Reason for Exam: CT HEAD WO CONTRAST FINDINGS: There is no acute infarction, intracranial hemorrhage or intracranial mass lesion. No mass effect, midline shift or extra-axial collection is noted. There are mild nonspecific foci of periventricular and subcortical cerebral white matter hypodensity, most likely representing chronic microangiopathic disease in this age group. Chronic encephalomalacia involving the anterior aspect of right corona radiata. Chronic encephalomalacia involving right medial occipital lobe. The brain parenchyma is otherwise normal. The cerebellar tonsils are in normal position. The ventricles, sulci, and cisterns are mildly prominent suggestive of mild generalized volume loss. The globes and orbits are within normal limits. The visualized extracranial structures including paranasal sinuses and mastoid air cells are unremarkable.  No fracture is identified. No evidence for acute intracranial hemorrhage, territorial infarction or intracranial mass lesion. Moderate chronic microangiopathic ischemic disease. Chronic encephalomalacia involving the anterior aspect of right corona radiata. Chronic encephalomalacia involving right medial occipital lobe. Mild generalized volume loss. CT ABDOMEN PELVIS W IV CONTRAST Additional Contrast? None    Result Date: 9/27/2022  EXAMINATION: CT OF THE ABDOMEN AND PELVIS WITH CONTRAST 9/27/2022 1:26 pm TECHNIQUE: CT of the abdomen and pelvis was performed with the administration of intravenous contrast. Multiplanar reformatted images are provided for review. Automated exposure control, iterative reconstruction, and/or weight based adjustment of the mA/kV was utilized to reduce the radiation dose to as low as reasonably achievable. COMPARISON: None. HISTORY: ORDERING SYSTEM PROVIDED HISTORY: hypotensive, abdominal pain TECHNOLOGIST PROVIDED HISTORY: hypotensive, abdominal pain Decision Support Exception - unselect if not a suspected or confirmed emergency medical condition->Emergency Medical Condition (MA) Reason for Exam: hypotensive, abdominal pain FINDINGS: LOWER CHEST:  Consolidative changes of the right lower lobe which does not have typical appearance of atelectasis and may represent pneumonia. Stanley Ogren KIDNEYS AND URINARY TRACT: No renal calculi are identified. There is no evidence for hydronephrosis. The ureters are of normal course and caliber. Multiple hypodense renal lesions are mostly consistent with simple cysts. ORGANS: Visualized portions of the liver, spleen, pancreas, gallbladder, and adrenal glands demonstrate no acute abnormality. GI/BOWEL: No bowel obstruction. No evidence of acute appendicitis. Massive stool ball is noted within the colon and rectum. The stool bar exerts significant mass effect on the prostate gland and urinary bladder. PELVIS: The bladder and pelvic organs are unremarkable. PERITONEUM/RETROPERITONEUM: No free air or free fluid is noted. No pathologically enlarged lymphadenopathy. The vasculature do not demonstrate acute abnormality. BONES/SOFT TISSUES: The osseous structures demonstrate no acute abnormality. Bilateral fat containing inguinal hernias. Massive stool ball is noted within the colon and rectum. The stool bar exerts significant mass effect on the prostate gland and urinary bladder. Bilateral fat containing inguinal hernias. Consolidative changes of the right lower lobe which does not have typical appearance of atelectasis and may represent pneumonia. XR CHEST PORTABLE    Result Date: 9/27/2022  EXAMINATION: ONE XRAY VIEW OF THE CHEST 9/27/2022 12:21 pm COMPARISON: None. HISTORY: ORDERING SYSTEM PROVIDED HISTORY: Altered Mental Status TECHNOLOGIST PROVIDED HISTORY: Altered Mental Status FINDINGS: The lungs are without acute focal process. Suspected small nodule of the right upper lung. There is no effusion or pneumothorax. The cardiomediastinal silhouette is without acute process. The osseous structures are without acute process. 1. No acute process. 2. Suspected small nodule of the right upper lung. Recommend nonemergent noncontrast chest CT. RECENT VITALS:     Temp: 99.1 °F (37.3 °C),  Heart Rate: (!) 114, Resp: 15, BP: 96/63, SpO2: 97 %      This patient is a 79 y.o. 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 Found to be hyperglycemic but not in DKA. Started on insulin drip. Became hypotensive and tachycardic. Started on levophed and given IVF. Concern for sepsis due to osteomyelitis of left foot. Possibly also pneumonia. Given meropenem and vanc. Podiatry and vascular surgery consulted. Admitted to ICU. Pending transfer to floor.      While awaiting transport to the floor, patient continued to be tachycardic and hypotensive. ICU resident recommended switching to mila synephrine. Trops also noted to be 120 then 150. Patient denies chest pain. Repeat EKG obtained and showed no ischemia. Cardiology consulted and patient started on heparin ggt. Patient now requiring multiple IV infusions with minimal peripheral access. Patient was consented for central line, as he was unable to be transported to MICU floor in timely manner and critical care resident not available. Patient refused IJ placement of central line so placed in left femoral vein. Please see separate procedure note. ED Course as of 09/28/22 1401   Tue Sep 27, 2022   1222 Patient hypotensive and tachycardic upon arrival.  Mentating well. Initial blood glucose 365. [TD]   1223 EKG shows sinus tachycardia 127 bpm.  . . No acute ST or T wave changes [TD]   1228 Patient given half liter in route to the ED. 30 cc/kg bolus started while in the ED. [TD]   1300 patient's white count 23.9. [TD]   1314 Meropenem and vancomycin started due to penicillin allergy [TD]   1318 Outside records showing EF of 15% with midodrine on meds list.  Will hold fluids for now. Patient having pain due to foot. Home [place] ordered [TD]   1400 5 mcg epinephrine started due to continued blood pressure in the 26N systolic [TD]   3865 Given dose of hydrocortisone for continued hypotension and h/o multiple short doses of steroids at nursing facilty [TD]   1534 Spoke with ICU physician who will accept the patient. We will consult podiatry regarding the patient's left foot osteomyelitis. [TD]      ED Course User Index  [TD] Miguel Ángel Reyes,        OUTSTANDING TASKS / RECOMMENDATIONS:    Transfer to floor     FINAL IMPRESSION:     1. Septic shock ([address]    2. Foot infection    3.  Pneumonia of right lower lobe due to infectious organism        DISPOSITION:         DISPOSITION:  []  Discharge   []  Transfer -    [x]  Admission -  ICU   []  Against Medical Advice   []  Eloped   FOLLOW-UP: No follow-up provider specified.    DISCHARGE MEDICATIONS: New Prescriptions    No medications on file          Sung Camarillo DO  Emergency Medicine Resident  [facility]       Sung Camarillo, [address] [place] [address]  Resident  09/28/22 [id number]

## 2022-09-27 NOTE — PROGRESS NOTES
[address] [facility] Pharmacokinetic Monitoring Service - Vancomycin     Colleen Ang is a 79 y.o. male starting on vancomycin therapy for sepsis. Pharmacy consulted by Dr Quan Estrada for monitoring and adjustment. Target Concentration: Dosing based on anticipated concentration <15 mg/L due to renal impairment/insufficiency    Additional Antimicrobials: meropenem    Pertinent Laboratory Values: Wt Readings from Last 1 Encounters:   09/27/22 150 lb (68 kg)     Temp Readings from Last 1 Encounters:   09/27/22 99.1 °F (37.3 °C) (Oral)     Estimated Creatinine Clearance: 34 mL/min (A) (based on SCr of 2.04 mg/dL (H)). Recent Labs     09/27/22  1230   CREATININE 2.04*   WBC 23.9*     Procalcitonin: N/A    Pertinent Cultures:  Culture Date Source Results   9/27 Blood Pending   9/27 Urine Pending   MRSA Nasal Swab: N/A. Non-respiratory infection.     Plan:  Concentration-guided dosing due to renal impairment/insufficiency  Start vancomycin 1000 mg IVPB once  No renal function history known at this time, will follow labs to determine redosing  Renal labs as indicated   Vancomycin concentration ordered for 9/28 @ am labs    Pharmacy will continue to monitor patient and adjust therapy as indicated    Thank you for the consult,  Kristel Mattson, [facility]  9/27/2022 1:18 PM

## 2022-09-28 VITALS
WEIGHT: 156.53 LBS | HEIGHT: 75 IN | OXYGEN SATURATION: 97 % | DIASTOLIC BLOOD PRESSURE: 59 MMHG | TEMPERATURE: 97.7 F | BODY MASS INDEX: 19.46 KG/M2 | RESPIRATION RATE: 19 BRPM | HEART RATE: 104 BPM | SYSTOLIC BLOOD PRESSURE: 78 MMHG

## 2022-09-28 LAB
ABSOLUTE EOS #: 0 K/UL (ref 0–0.4)
ABSOLUTE IMMATURE GRANULOCYTE: 0.31 K/UL (ref 0–0.3)
ABSOLUTE LYMPH #: 0.31 K/UL (ref 1–4.8)
ABSOLUTE MONO #: 2.18 K/UL (ref 0.1–0.8)
AMORPHOUS: ABNORMAL
ANION GAP SERPL CALCULATED.3IONS-SCNC: 18 MMOL/L (ref 9–17)
BACTERIA: ABNORMAL
BASOPHILS # BLD: 0 % (ref 0–2)
BASOPHILS ABSOLUTE: 0 K/UL (ref 0–0.2)
BILIRUBIN URINE: NEGATIVE
BUN BLDV-MCNC: 55 MG/DL (ref 8–23)
C-REACTIVE PROTEIN: 374 MG/L (ref 0–5)
CALCIUM SERPL-MCNC: 8.8 MG/DL (ref 8.6–10.4)
CHLORIDE BLD-SCNC: 93 MMOL/L (ref 98–107)
CO2: 19 MMOL/L (ref 20–31)
COLOR: YELLOW
CREAT SERPL-MCNC: 2.38 MG/DL (ref 0.7–1.2)
EOSINOPHILS RELATIVE PERCENT: 0 % (ref 1–4)
EPITHELIAL CELLS UA: ABNORMAL /HPF (ref 0–5)
GFR AFRICAN AMERICAN: 33 ML/MIN
GFR NON-AFRICAN AMERICAN: 27 ML/MIN
GFR SERPL CREATININE-BSD FRML MDRD: ABNORMAL ML/MIN/{1.73_M2}
GLUCOSE BLD-MCNC: 110 MG/DL (ref 75–110)
GLUCOSE BLD-MCNC: 137 MG/DL (ref 75–110)
GLUCOSE BLD-MCNC: 143 MG/DL (ref 75–110)
GLUCOSE BLD-MCNC: 196 MG/DL (ref 70–99)
GLUCOSE BLD-MCNC: 216 MG/DL (ref 75–110)
GLUCOSE BLD-MCNC: 279 MG/DL (ref 75–110)
GLUCOSE BLD-MCNC: 341 MG/DL (ref 75–110)
GLUCOSE BLD-MCNC: 351 MG/DL (ref 75–110)
GLUCOSE BLD-MCNC: 435 MG/DL (ref 75–110)
GLUCOSE BLD-MCNC: 516 MG/DL (ref 75–110)
GLUCOSE URINE: NEGATIVE
HCT VFR BLD CALC: 29.2 % (ref 40.7–50.3)
HEMOGLOBIN: 9.7 G/DL (ref 13–17)
IMMATURE GRANULOCYTES: 1 %
KETONES, URINE: ABNORMAL
LACTIC ACID, WHOLE BLOOD: 1.8 MMOL/L (ref 0.7–2.1)
LEUKOCYTE ESTERASE, URINE: NEGATIVE
LYMPHOCYTES # BLD: 1 % (ref 24–44)
MCH RBC QN AUTO: 31.9 PG (ref 25.2–33.5)
MCHC RBC AUTO-ENTMCNC: 33.2 G/DL (ref 28.4–34.8)
MCV RBC AUTO: 96.1 FL (ref 82.6–102.9)
MONOCYTES # BLD: 7 % (ref 1–7)
MORPHOLOGY: ABNORMAL
MORPHOLOGY: ABNORMAL
NITRITE, URINE: NEGATIVE
NRBC AUTOMATED: 0 PER 100 WBC
PARTIAL THROMBOPLASTIN TIME: 35.5 SEC (ref 20.5–30.5)
PARTIAL THROMBOPLASTIN TIME: 39.2 SEC (ref 20.5–30.5)
PDW BLD-RTO: 13.7 % (ref 11.8–14.4)
PH UA: 5 (ref 5–8)
PLATELET # BLD: 227 K/UL (ref 138–453)
PMV BLD AUTO: 10.6 FL (ref 8.1–13.5)
POTASSIUM SERPL-SCNC: 4.4 MMOL/L (ref 3.7–5.3)
PROTEIN UA: ABNORMAL
RBC # BLD: 3.04 M/UL (ref 4.21–5.77)
RBC UA: ABNORMAL /HPF (ref 0–2)
SEDIMENTATION RATE, ERYTHROCYTE: 54 MM/HR (ref 0–20)
SEG NEUTROPHILS: 91 % (ref 36–66)
SEGMENTED NEUTROPHILS ABSOLUTE COUNT: 28.4 K/UL (ref 1.8–7.7)
SODIUM BLD-SCNC: 130 MMOL/L (ref 135–144)
SPECIFIC GRAVITY UA: 1.03 (ref 1–1.03)
TOTAL CK: 124 U/L (ref 39–308)
TROPONIN, HIGH SENSITIVITY: 227 NG/L (ref 0–22)
TROPONIN, HIGH SENSITIVITY: 263 NG/L (ref 0–22)
TURBIDITY: ABNORMAL
URINE HGB: ABNORMAL
UROBILINOGEN, URINE: NORMAL
VANCOMYCIN RANDOM: 11.6 UG/ML
WBC # BLD: 31.2 K/UL (ref 3.5–11.3)
WBC UA: ABNORMAL /HPF (ref 0–5)

## 2022-09-28 PROCEDURE — 6360000002 HC RX W HCPCS: Performed by: STUDENT IN AN ORGANIZED HEALTH CARE EDUCATION/TRAINING PROGRAM

## 2022-09-28 PROCEDURE — 2500000003 HC RX 250 WO HCPCS: Performed by: INTERNAL MEDICINE

## 2022-09-28 PROCEDURE — 82550 ASSAY OF CK (CPK): CPT

## 2022-09-28 PROCEDURE — 99291 CRITICAL CARE FIRST HOUR: CPT | Performed by: INTERNAL MEDICINE

## 2022-09-28 PROCEDURE — 84484 ASSAY OF TROPONIN QUANT: CPT

## 2022-09-28 PROCEDURE — 6370000000 HC RX 637 (ALT 250 FOR IP)

## 2022-09-28 PROCEDURE — 85025 COMPLETE CBC W/AUTO DIFF WBC: CPT

## 2022-09-28 PROCEDURE — 6360000002 HC RX W HCPCS

## 2022-09-28 PROCEDURE — 80202 ASSAY OF VANCOMYCIN: CPT

## 2022-09-28 PROCEDURE — 93308 TTE F-UP OR LMTD: CPT

## 2022-09-28 PROCEDURE — 51702 INSERT TEMP BLADDER CATH: CPT

## 2022-09-28 PROCEDURE — 83605 ASSAY OF LACTIC ACID: CPT

## 2022-09-28 PROCEDURE — 2580000003 HC RX 258: Performed by: STUDENT IN AN ORGANIZED HEALTH CARE EDUCATION/TRAINING PROGRAM

## 2022-09-28 PROCEDURE — 93923 UPR/LXTR ART STDY 3+ LVLS: CPT

## 2022-09-28 PROCEDURE — 2500000003 HC RX 250 WO HCPCS: Performed by: STUDENT IN AN ORGANIZED HEALTH CARE EDUCATION/TRAINING PROGRAM

## 2022-09-28 PROCEDURE — 81001 URINALYSIS AUTO W/SCOPE: CPT

## 2022-09-28 PROCEDURE — 2580000003 HC RX 258

## 2022-09-28 PROCEDURE — 86140 C-REACTIVE PROTEIN: CPT

## 2022-09-28 PROCEDURE — 87641 MR-STAPH DNA AMP PROBE: CPT

## 2022-09-28 PROCEDURE — 93325 DOPPLER ECHO COLOR FLOW MAPG: CPT

## 2022-09-28 PROCEDURE — 99223 1ST HOSP IP/OBS HIGH 75: CPT | Performed by: INTERNAL MEDICINE

## 2022-09-28 PROCEDURE — C9113 INJ PANTOPRAZOLE SODIUM, VIA: HCPCS | Performed by: STUDENT IN AN ORGANIZED HEALTH CARE EDUCATION/TRAINING PROGRAM

## 2022-09-28 PROCEDURE — 85730 THROMBOPLASTIN TIME PARTIAL: CPT

## 2022-09-28 PROCEDURE — 87086 URINE CULTURE/COLONY COUNT: CPT

## 2022-09-28 PROCEDURE — 80048 BASIC METABOLIC PNL TOTAL CA: CPT

## 2022-09-28 PROCEDURE — 36415 COLL VENOUS BLD VENIPUNCTURE: CPT

## 2022-09-28 PROCEDURE — 85652 RBC SED RATE AUTOMATED: CPT

## 2022-09-28 PROCEDURE — 6370000000 HC RX 637 (ALT 250 FOR IP): Performed by: STUDENT IN AN ORGANIZED HEALTH CARE EDUCATION/TRAINING PROGRAM

## 2022-09-28 PROCEDURE — 82947 ASSAY GLUCOSE BLOOD QUANT: CPT

## 2022-09-28 RX ORDER — HEPARIN SODIUM AND DEXTROSE 10000; 5 [USP'U]/100ML; G/100ML
5-30 INJECTION INTRAVENOUS CONTINUOUS
Status: DISCONTINUED | OUTPATIENT
Start: 2022-09-28 | End: 2022-09-28 | Stop reason: HOSPADM

## 2022-09-28 RX ORDER — LINEZOLID 600 MG/1
600 TABLET, FILM COATED ORAL EVERY 12 HOURS SCHEDULED
Status: DISCONTINUED | OUTPATIENT
Start: 2022-09-28 | End: 2022-09-28

## 2022-09-28 RX ORDER — INSULIN LISPRO 100 [IU]/ML
0-8 INJECTION, SOLUTION INTRAVENOUS; SUBCUTANEOUS
Status: DISCONTINUED | OUTPATIENT
Start: 2022-09-28 | End: 2022-09-28 | Stop reason: HOSPADM

## 2022-09-28 RX ORDER — 0.9 % SODIUM CHLORIDE 0.9 %
500 INTRAVENOUS SOLUTION INTRAVENOUS ONCE
Status: COMPLETED | OUTPATIENT
Start: 2022-09-28 | End: 2022-09-28

## 2022-09-28 RX ORDER — CLINDAMYCIN PHOSPHATE 600 MG/50ML
600 INJECTION INTRAVENOUS EVERY 8 HOURS
Status: DISCONTINUED | OUTPATIENT
Start: 2022-09-28 | End: 2022-09-28 | Stop reason: HOSPADM

## 2022-09-28 RX ADMIN — SODIUM CHLORIDE 18.75 UNITS/HR: 9 INJECTION, SOLUTION INTRAVENOUS at 01:38

## 2022-09-28 RX ADMIN — Medication 30 MCG/MIN: at 01:30

## 2022-09-28 RX ADMIN — HEPARIN SODIUM AND DEXTROSE 12 UNITS/KG/HR: 10000; 5 INJECTION INTRAVENOUS at 01:35

## 2022-09-28 RX ADMIN — HYDROCODONE BITARTRATE AND ACETAMINOPHEN 1 TABLET: 5; 325 TABLET ORAL at 01:54

## 2022-09-28 RX ADMIN — HYDROCODONE BITARTRATE AND ACETAMINOPHEN 1 TABLET: 5; 325 TABLET ORAL at 09:11

## 2022-09-28 RX ADMIN — MAGNESIUM HYDROXIDE 30 ML: 400 SUSPENSION ORAL at 09:42

## 2022-09-28 RX ADMIN — HEPARIN SODIUM 2000 UNITS: 1000 INJECTION INTRAVENOUS; SUBCUTANEOUS at 16:12

## 2022-09-28 RX ADMIN — POLYETHYLENE GLYCOL 3350 17 G: 17 POWDER, FOR SOLUTION ORAL at 09:42

## 2022-09-28 RX ADMIN — SODIUM CHLORIDE, PRESERVATIVE FREE 10 ML: 5 INJECTION INTRAVENOUS at 09:40

## 2022-09-28 RX ADMIN — SODIUM CHLORIDE: 9 INJECTION, SOLUTION INTRAVENOUS at 02:44

## 2022-09-28 RX ADMIN — INSULIN LISPRO 6 UNITS: 100 INJECTION, SOLUTION INTRAVENOUS; SUBCUTANEOUS at 10:59

## 2022-09-28 RX ADMIN — HYDROCORTISONE SODIUM SUCCINATE 50 MG: 100 INJECTION, POWDER, FOR SOLUTION INTRAMUSCULAR; INTRAVENOUS at 03:15

## 2022-09-28 RX ADMIN — Medication 50 MCG/MIN: at 09:38

## 2022-09-28 RX ADMIN — SODIUM CHLORIDE, PRESERVATIVE FREE 40 MG: 5 INJECTION INTRAVENOUS at 09:41

## 2022-09-28 RX ADMIN — CEFEPIME 2000 MG: 2 INJECTION, POWDER, FOR SOLUTION INTRAVENOUS at 10:48

## 2022-09-28 RX ADMIN — HEPARIN SODIUM 2000 UNITS: 1000 INJECTION INTRAVENOUS; SUBCUTANEOUS at 09:50

## 2022-09-28 RX ADMIN — HEPARIN SODIUM 4000 UNITS: 1000 INJECTION INTRAVENOUS; SUBCUTANEOUS at 01:32

## 2022-09-28 RX ADMIN — VANCOMYCIN HYDROCHLORIDE 1000 MG: 10 INJECTION, POWDER, LYOPHILIZED, FOR SOLUTION INTRAVENOUS at 09:39

## 2022-09-28 RX ADMIN — HYDROCODONE BITARTRATE AND ACETAMINOPHEN 1 TABLET: 5; 325 TABLET ORAL at 15:45

## 2022-09-28 RX ADMIN — HYDROCORTISONE SODIUM SUCCINATE 50 MG: 100 INJECTION, POWDER, FOR SOLUTION INTRAMUSCULAR; INTRAVENOUS at 09:44

## 2022-09-28 RX ADMIN — HEPARIN SODIUM AND DEXTROSE 12 UNITS/KG/HR: 10000; 5 INJECTION INTRAVENOUS at 02:04

## 2022-09-28 RX ADMIN — SODIUM CHLORIDE, PRESERVATIVE FREE 10 ML: 5 INJECTION INTRAVENOUS at 02:21

## 2022-09-28 RX ADMIN — Medication 70 MCG/MIN: at 15:41

## 2022-09-28 RX ADMIN — CLINDAMYCIN IN 5 PERCENT DEXTROSE 600 MG: 12 INJECTION, SOLUTION INTRAVENOUS at 10:47

## 2022-09-28 RX ADMIN — SODIUM CHLORIDE 500 ML: 9 INJECTION, SOLUTION INTRAVENOUS at 09:48

## 2022-09-28 ASSESSMENT — PAIN SCALES - GENERAL
PAINLEVEL_OUTOF10: 4
PAINLEVEL_OUTOF10: 8

## 2022-09-28 ASSESSMENT — PAIN DESCRIPTION - LOCATION
LOCATION: LEG

## 2022-09-28 ASSESSMENT — ENCOUNTER SYMPTOMS
RHINORRHEA: 0
COUGH: 0
CHEST TIGHTNESS: 0
ABDOMINAL PAIN: 0
ABDOMINAL DISTENTION: 0
ROS SKIN COMMENTS: LEFT TOES

## 2022-09-28 ASSESSMENT — PAIN DESCRIPTION - ORIENTATION
ORIENTATION: RIGHT

## 2022-09-28 ASSESSMENT — PAIN SCALES - WONG BAKER: WONGBAKER_NUMERICALRESPONSE: 2

## 2022-09-28 NOTE — ED NOTES
MAP goal is greater than 65 per verbal order by [facility] resident     Brenda Robert RN  09/27/22 [id number]

## 2022-09-28 NOTE — ED PROVIDER NOTES
[facility]     Emergency Department     Faculty Attestation    I performed a history and physical examination of the patient and discussed management with the resident. I have reviewed and agree with the residents findings including all diagnostic interpretations, and treatment plans as written. Any areas of disagreement are noted on the chart. I was personally present for the key portions of any procedures. I have documented in the chart those procedures where I was not present during the key portions. I have reviewed the emergency nurses triage note. I agree with the chief complaint, past medical history, past surgical history, allergies, medications, social and family history as documented unless otherwise noted below. Documentation of the HPI, Physical Exam and Medical Decision Making performed by scribwagner is based on my personal performance of the HPI, PE and MDM. For Physician Assistant/ Nurse Practitioner cases/documentation I have personally evaluated this patient and have completed at least one if not all key elements of the E/M (history, physical exam, and MDM). Additional findings are as noted. -Procedure supervision    80 yo M did not initially see this patient I did not receive signout for this patient, critical care directed us that they were unable to place a central line, I supervised Dr. Cristhian Winn in placement of central line left femoral site, per sterile technique, triple-lumen was placed over the guidewire, blood loss minimal, good blood return on all 3 ports was obtained, nonpulsatile, wound was dressed, complication none,    EKG Interpretation    Interpreted by me      CRITICAL CARE: There was a high probability of clinically significant/life threatening deterioration in this patient's condition which required my urgent intervention. Total critical care time was 0 minutes. This excludes any time for separately reportable procedures. Vin Torres DO  09/28/22 [id number] John DUNAWAY, DO  09/28/22 [id number]

## 2022-09-28 NOTE — PROGRESS NOTES
INTENSIVE CARE UNIT  Resident Physician Progress Note    Patient - Britton Nelson  Date of Admission -  9/27/2022 11:58 AM  Date of Evaluation -  9/28/2022  Room and Bed Number -  0890/8530-61   Hospital Day - 1    SUBJECTIVE:   HISTORY OF PRESENT ILLNESS:    Britton Nelson is a 79 y.o. who has been brought to ER from nursing facility for hypotension, hyperglycemia and some altered mentation. Patient has past medical history of recent right BKA for severe peripheral vascular disease,HFrEF (dilated cardiomyopathy) with EF 10 to 15%, recent ischemic stroke with hemorrhagic transformation, oropharyngeal dysphagia status post PEG tube placement, type 2 diabetes mellitus. On arrival to ER, patient was hypotensive, and tachycardic with concern of sepsis. Initial lab work-up showed leukocyte count of 23, creatinine 2.04, BUN 56, blood glucose 407, high-sensitivity troponins 120. Patient was given 1.5 L IV fluids with some improvement in blood pressure but his MAP dropped again and he started requiring Levophed. EKG showed concern of irregularly irregular rhythm. Unclear if patient has history of A. fib but it seems like at one point patient was on Coumadin but I am not sure if that was for severe peripheral arterial disease. Patient has chronic nonhealing left foot wound. X-ray left foot showing osteomyelitis. Patient has a PEG tube likely after recent oropharyngeal dysphagia related to stroke. Important note that patient also received IV contrast in the ER for CT abdomen and pelvis which showed massive stool burden in the colon and rectum with concern of significant mass-effect on prostate and urinary bladder. There is also consolidative changes of right lower lobe of lung which could be related to pneumonia.  Blood Cultures x2 growing streptococcus group A.     OVERNIGHT EVENTS:      No acute events overnight  Alert and oriented this morning  Requiring Sigifredo-Synephrine for blood pressure support  Blood cultures x2 growing Streptococcus pyogenes  Saturating well on room air  Leukocyte count 23> 34> 31  Blood glucose better  Hemoglobin stable  Creatinine 2.04> 2.38  Harris's catheter placed for monitoring of urine output  Lot of stool burden on CT scan, receiving GlycoLax and milk of mag  Has PEG tube but is able to eat as swallow as well  On antibiotics for left foot osteomyelitis/wet gangrene  On heparin drip for elevated troponins    OBJECTIVE:   VITAL SIGNS:  Patient Vitals for the past 8 hrs:   BP Temp Temp src Pulse Resp SpO2   09/28/22 1400 -- 97.7 °F (36.5 °C) Oral -- -- --   09/28/22 1330 89/67 -- -- (!) 104 17 98 %   09/28/22 1315 87/61 -- -- (!) 102 16 95 %   09/28/22 1300 84/63 -- -- (!) 102 15 95 %   09/28/22 1245 81/64 -- -- (!) 101 16 95 %   09/28/22 1230 96/65 -- -- (!) 107 17 98 %   09/28/22 1215 90/68 -- -- (!) 105 17 96 %   09/28/22 1200 93/66 97.3 °F (36.3 °C) Oral (!) 106 16 96 %   09/28/22 1145 (!) 84/57 -- -- (!) 102 15 94 %   09/28/22 1130 89/66 -- -- (!) 104 19 95 %   09/28/22 1115 (!) 78/53 -- -- (!) 105 16 94 %   09/28/22 1100 (!) 87/55 -- -- (!) 108 20 95 %   09/28/22 1045 99/86 -- -- (!) 109 20 97 %   09/28/22 1030 (!) 78/55 -- -- (!) 105 18 94 %   09/28/22 1015 (!) 82/52 -- -- (!) 107 18 97 %   09/28/22 1000 (!) 83/58 -- -- 100 20 96 %   09/28/22 0945 (!) 86/52 -- -- (!) 108 19 95 %   09/28/22 0930 (!) 80/56 -- -- (!) 110 -- 96 %   09/28/22 0915 86/66 98.2 °F (36.8 °C) Oral (!) 112 17 96 %   09/28/22 0904 92/65 -- -- -- -- --   09/28/22 0900 92/65 -- -- (!) 109 18 94 %   09/28/22 0845 -- -- -- (!) 108 18 94 %   09/28/22 0830 -- -- -- (!) 109 19 95 %   09/28/22 0815 -- -- -- (!) 110 17 95 %   09/28/22 0800 90/62 -- -- (!) 109 23 95 %   09/28/22 0745 90/70 -- -- (!) 112 20 97 %   09/28/22 0730 85/60 -- -- (!) 109 19 96 %   09/28/22 0715 85/63 -- -- (!) 110 19 96 %   09/28/22 0700 87/62 -- -- (!) 112 19 96 %   09/28/22 0645 85/64 -- -- (!) 111 22 96 %   09/28/22 0630 88/65 -- -- (!) 112 21 95 % 22 0615 92/66 -- -- 97 18 97 %       Last Body weight:   Wt Readings from Last 3 Encounters:   22 156 lb 8.4 oz (71 kg)   22 150 lb (68 kg)       Body Mass Index : Body mass index is 19.56 kg/m². Tmax over 24 hours: Temp (24hrs), Av.8 °F (36.6 °C), Min:97.3 °F (36.3 °C), Max:98.2 °F (36.8 °C)      Ins/Outs:   In: 2001.3 [I.V.:1148.7]  Out: 429 [Urine:429]    PHYSICAL EXAM:  Constitutional: Appears sick, moderate distress  EENT: PERRLA, EOMI, sclera clear, anicteric, oropharynx clear, no lesions, neck supple with midline trachea. Neck: Supple, symmetrical, trachea midline, no adenopathy, thyroid symmetric, no jvd skin normal  Respiratory: clear to auscultation, no wheezes or rales and unlabored breathing. No intercostal tenderness  Cardiovascular: regular rate and rhythm, normal S1, S2, no murmur noted and 2+ pulses throughout  Abdomen: soft, nontender, nondistended, no masses or organomegaly  Neurological: No focal deficit  Extremities: Right lower extremity BKA. Chronic nonhealing wound of left fourth, seems infected.     MEDICATIONS:  Scheduled Meds:   insulin lispro  0-8 Units SubCUTAneous 4x Daily AC & HS    clindamycin (CLEOCIN) IV  600 mg IntraVENous Q8H    sodium chloride flush  5-40 mL IntraVENous 2 times per day    pantoprazole (PROTONIX) 40 mg injection  40 mg IntraVENous Daily    cefepime  2,000 mg IntraVENous Q12H    hydrocortisone sodium succinate PF  50 mg IntraVENous Q8H    polyethylene glycol  17 g Per G Tube BID    magnesium hydroxide  30 mL Per G Tube BID       Continuous Infusions:   heparin (PORCINE) Infusion 14 Units/kg/hr (22 1213)    sodium chloride      dextrose      sodium chloride 75 mL/hr at 22 1213    phenylephrine 70 mcg/min (22 1213)       PRN Meds:   HYDROcodone 5 mg - acetaminophen, 1 tablet, Q6H PRN  sodium chloride flush, 5-40 mL, PRN  sodium chloride, , PRN  ondansetron, 4 mg, Q8H PRN   Or  ondansetron, 4 mg, Q6H PRN  polyethylene glycol, 17 g, Daily PRN  acetaminophen, 650 mg, Q6H PRN   Or  acetaminophen, 650 mg, Q6H PRN  glucose, 4 tablet, PRN  dextrose bolus, 125 mL, PRN   Or  dextrose bolus, 250 mL, PRN  glucagon (rDNA), 1 mg, PRN  dextrose, , Continuous PRN  heparin (porcine), 4,000 Units, PRN  heparin (porcine), 2,000 Units, PRN        DATA:  Complete Blood Count:   Recent Labs     09/27/22  1230 09/27/22  1938 09/28/22  0404   WBC 23.9* 34.7* 31.2*   RBC 3.06* 2.83* 3.04*   HGB 9.6* 8.9* 9.7*   HCT 28.7* 26.4* 29.2*   MCV 93.8 93.3 96.1   MCH 31.4 31.4 31.9   MCHC 33.4 33.7 33.2   RDW 13.5 13.7 13.7    223 227   MPV 10.6 10.5 10.6        Last 3 Blood Glucose:   Recent Labs     09/27/22  1218 09/27/22  1230 09/27/22  1421 09/28/22  0404   GLUCOSE 396 407* 356 196*        PT/INR:    Lab Results   Component Value Date/Time    PROTIME 15.9 09/27/2022 12:30 PM    INR 1.6 09/27/2022 12:30 PM     PTT:    Lab Results   Component Value Date/Time    APTT 35.5 09/28/2022 07:47 AM       Basic Metabolic Profile:   Recent Labs     09/27/22  1230 09/27/22  1421 09/28/22  0404   *  --  130*   K 4.8  --  4.4   CL 86*  --  93*   CO2 25  --  19*   BUN 56*  --  55*   CREATININE 2.04*  --  2.38*   GLUCOSE 407* 356 196*       Liver Function:  Recent Labs     09/27/22  1230   PROT 6.6   LABALBU 2.9*   ALT 9   AST 14   ALKPHOS 119   BILITOT 0.4       Magnesium: No results found for: MG  Phosphorus: No results found for: PHOS  Ionized Calcium: No results found for: CAION     Urinalysis:   Lab Results   Component Value Date/Time    NITRU NEGATIVE 09/28/2022 10:21 AM    COLORU Yellow 09/28/2022 10:21 AM    PHUR 5.0 09/28/2022 10:21 AM    WBCUA 5 TO 10 09/28/2022 10:21 AM    RBCUA 0 TO 2 09/28/2022 10:21 AM    BACTERIA MANY 09/28/2022 10:21 AM    SPECGRAV 1.026 09/28/2022 10:21 AM    LEUKOCYTESUR NEGATIVE 09/28/2022 10:21 AM    UROBILINOGEN Normal 09/28/2022 10:21 AM    BILIRUBINUR NEGATIVE 09/28/2022 10:21 AM    GLUCOSEU NEGATIVE 09/28/2022 10:21 AM Juan Carvalho 09/28/2022 10:21 AM    AMORPHOUS 2+ 09/28/2022 10:21 AM       HgBA1c:  No results found for: LABA1C  TSH:    Lab Results   Component Value Date/Time    TSH 0.16 09/27/2022 12:30 PM     Lactic Acid: No results found for: LACTA   Troponin: No results for input(s): TROPONINI in the last 72 hours. Radiology/Imaging:  VL LOWER EXTREMITY ARTERIAL SEGMENTAL PRESSURES W PPG   Final Result      CT ABDOMEN PELVIS W IV CONTRAST Additional Contrast? None   Final Result   Massive stool ball is noted within the colon and rectum. The stool bar exerts   significant mass effect on the prostate gland and urinary bladder. Bilateral fat containing inguinal hernias. Consolidative changes of the right lower lobe which does not have typical   appearance of atelectasis and may represent pneumonia. CT HEAD WO CONTRAST   Final Result      No evidence for acute intracranial hemorrhage, territorial infarction or   intracranial mass lesion. Moderate chronic microangiopathic ischemic disease. Chronic encephalomalacia   involving the anterior aspect of right corona radiata. Chronic   encephalomalacia involving right medial occipital lobe. Mild generalized volume loss. XR CHEST PORTABLE   Final Result   1. No acute process. 2. Suspected small nodule of the right upper lung. Recommend nonemergent   noncontrast chest CT. XR FOOT LEFT (MIN 3 VIEWS)   Final Result   Findings suspicious for osteomyelitis of the middle phalanx of the small toe.          MRI FOOT LEFT WO CONTRAST    (Results Pending)       ASSESSMENT:     Patient Active Problem List    Diagnosis Date Noted    Sepsis ([facility] [address] 09/27/2022    Septic shock ([facility] [address] 09/27/2022        PLAN:       PLAN/MEDICAL DECISION MAKING:  Neurologic:   Neuro checks per protocol  Sedation: None  Paralytic: NA  Pain management: None  Cardiovascular:  History of HFrEF with EF 10-15% (likely dilated cardiomyopathy) on ECHO 1/2022 - on Coreg, Niya Goyal, Aldactone  Unclear if history of A. Fib? Elevated high-sensitivity troponins - uptrending. No chest pain  Septic shock - source left foot osteomyelitis/wet gangrene  Continue low-dose heparin drip to cover for elevated troponins and concerning history of A. fib  Continue Sigifredo-Synephrine for blood pressure support  Hydrocortisone 50 mg 3 times daily  Follow-up final culture sensitivities  Pulmonary:  Chest x-ray negative for vascular congestion/edema  Supplemental oxygen as needed  Keep SPO2> 92%  Monitor respiratory status while hydrating  GI/Nutrition  History of oropharyngeal dysphagia status post PEG tube placement in the recent past.  Able to swallow at this point  All medications through PEG tube  Huge stool burden on CT scan  Continue GlycoLax and milk of mag, consider enema if persistent constipation  Renal/Fluid/Electrolyte  IV Fluids: Normal saline 75 cc/h  Give 500 mL IV fluid bolus  I/O: In: 2001.3 [I.V.:1148.7]  Out: 429 [Urine:429]  UOP: 229 mL after placement of Harris's catheter. 200 mL overnight. BUN/Cr: 55/2.38  Monitor electrolytes, replace PRN   ID  WBC: 23> 34> 31  Tmax: Temp (24hrs), Av.8 °F (36.6 °C), Min:97.3 °F (36.3 °C), Max:98.2 °F (36.8 °C)  Left foot osteomyelitis/wet gangrene  Antimicrobials: Cefepime and clindamycin day 2  Infectious disease following  Vascular surgery and podiatry following    Extremities/vascular/musculoskeletal:  History of peripheral arterial disease  Chronic nonhealing left foot wound  Left foot osteomyelitis/wet gangrene  Podiatry and vascular surgery following  Patient to be transferred to [address] [place] [address] on request of vascular surgeon at [address] [place] [address] (Dr. Reji Hodges)  Hematology:  H/H: Stable  Endocrine:   Glucose - better  Continue sliding scale insulin  Hypoglycemia protocol  DVT Prophylaxis  EPC Cuffs  Heparin drip    CODE STATUS: Full Code    DISPOSITION:  [x] To remain ICU: yes.   Initiated the transfer to [facility] on request of vascular surgeon at [facility] (Dr. Adam Sherman)  [] OK for out of ICU from [address] [place] [address], MD  Internal Medicine Resident, PGY-3  [id number] Greenberg  3/60/6836,0:33 PM

## 2022-09-28 NOTE — PROGRESS NOTES
Yenifer Welsh, [facility]. Foot infection [L08.9]  Septic shock ([facility] [address] [A41.9, R65.21]  [facility] [A41.9]  Pneumonia of right lower lobe due to infectious organism [J18.9] . Vitals:    09/27/22 2257   BP: 89/63   Pulse: (!) 116   Resp: 17   Temp:    SpO2: 92%   . Patients home meds are   Prior to Admission medications    Medication Sig Start Date End Date Taking? Authorizing Provider   bumetanide (BUMEX) 1 MG tablet Take 1 mg by mouth daily   Yes Historical Provider, MD   ammonium lactate (AMLACTIN) 12 % cream Apply topically as needed for Dry Skin Apply topically as needed. Yes Historical Provider, MD   senna-docusate (PERICOLACE) 8.6-50 MG per tablet Take 1 tablet by mouth daily   Yes Historical Provider, MD   HYDROcodone-acetaminophen (NORCO) 5-325 MG per tablet Take 1 tablet by mouth every 6 hours as needed for Pain. Yes Historical Provider, MD   midodrine (PROAMATINE) 5 MG tablet Take 10 mg by mouth daily   Yes Historical Provider, MD   magnesium hydroxide (MILK OF MAGNESIA CONCENTRATE) 2400 MG/10ML SUSP Take 2,400 mg by mouth once as needed   Yes Historical Provider, MD   methylPREDNISolone (MEDROL DOSEPACK) 4 MG tablet Take 4 mg by mouth See Admin Instructions Take by mouth. Yes Historical Provider, MD   bisacodyl (DULCOLAX) 10 MG suppository Place 10 mg rectally daily   Yes Historical Provider, MD   spironolactone (ALDACTONE) 25 MG tablet Take 25 mg by mouth daily   Yes Historical Provider, MD   polyethylene glycol (GLYCOLAX) 17 GM/SCOOP powder 17 g by Per G Tube route daily   Yes Historical Provider, MD   warfarin (COUMADIN) 6 MG tablet Take 6 mg by mouth   Yes Historical Provider, MD     Assessment   Patient states that he does not wear oxygen at home. Patient states that he does not have an at home BiPAP or CPAP machine. Patient states the nursing home was giving him medications for breathing but is unsure of the names and is not listed in his chart.     RR 19  Breath Sounds: clear/ diminished      Bronchodilator assessment at level  1    [x]    Bronchodilator Assessment  BRONCHODILATOR ASSESSMENT SCORE  Score 0 1 2 3 4 5   Breath Sounds   []  Patient Baseline [x]  No Wheeze good aeration []  Faint, scattered wheezing, good aeration []  Expiratory Wheezing and or moderately diminished []  Insp/Exp wheeze and/or very diminished []  Insp/Exp and/ or marked distress   Respiratory Rate   []  Patient Baseline [x]  Less than 20 []  Less than 20 []  20-25 []  Greater than 25 []  Greater than 25   Peak flow % of Pred or PB [x]  NA   []  Greater than 90%  []  81-90% []  71-80% []  Less than or equal to 70%  or unable to perform []  Unable due to Respiratory Distress   Dyspnea re []  Patient Baseline [x]  No SOB []  No SOB []  SOB on exertion []  SOB min activity []  At rest/acute   e FEV% Predicted       [x]  NA []  Above 69%  []  Unable []  Above 60-69%  []  Unable []  Above 50-59%  []  Unable []  Above 35-49%  []  Unable []  Less than 35%  []  Unable          Yenifer Welsh RCP  1:15 AM

## 2022-09-28 NOTE — CONSULTS
[facility]    Consult / H&P               Today's Date: 9/28/2022  Patient Name: oJrge Baker  Date of admission: 9/27/2022 11:58 AM  Patient's age: 79 y.o., 1955  Admission Dx: Foot infection [L08.9]  Septic shock ([facility] [A41.9, R65.21]  Sepsis ([facility] [A41.9]  Pneumonia of right lower lobe due to infectious organism [J18.9]    Reason for Consult:  Cardiac evaluation    Requesting Physician: Karlie Thomas MD    CHIEF COMPLAINT:  chronic wound     History Obtained From:  patient, electronic medical record    HISTORY OF PRESENT ILLNESS:      The patient is a 79 y.o.  male with past medical history of right BKA, PVD, heart failure with reduced ejection fraction with EF 10 to 15%, recent ischemic stroke, type 2 diabetes mellitus, s/p PEG tube presented to ER altered mental status. Noted to have hypotension and tachycardia. Upon arrival patient initially work-up was concern for sepsis. Was started on pressors. Initially on Levophed later switched to Sigifredo-Synephrine given tachycardia. Cardiology is consulted for elevated troponin. He has history of severely low ejection fraction for which she follows with cardiologist.  Last seen by them in May and there was discussion about ischemic work-up however patient wanted to think about it. Currently patient is chest pain-free. Troponin slightly trending up 120> 151> 227> 263. TSH 0.16, H&H 9.7 and 29.2. Currently on Sigifredo-Synephrine. Running at 70. Past Medical History:   has no past medical history on file. Past Surgical History:   has no past surgical history on file. Home Medications:    Prior to Admission medications    Medication Sig Start Date End Date Taking? Authorizing Provider   bumetanide (BUMEX) 1 MG tablet Take 1 mg by mouth daily   Yes Historical Provider, MD   ammonium lactate (AMLACTIN) 12 % cream Apply topically as needed for Dry Skin Apply topically as needed.    Yes Historical Provider, MD   senna-docusate (PERICOLACE) 8.6-50 MG per tablet Take 1 tablet by mouth daily   Yes Historical Provider, MD   HYDROcodone-acetaminophen (NORCO) 5-325 MG per tablet Take 1 tablet by mouth every 6 hours as needed for Pain. Yes Historical Provider, MD   midodrine (PROAMATINE) 5 MG tablet Take 10 mg by mouth daily   Yes Historical Provider, MD   magnesium hydroxide (MILK OF MAGNESIA CONCENTRATE) 2400 MG/10ML SUSP Take 2,400 mg by mouth once as needed   Yes Historical Provider, MD   methylPREDNISolone (MEDROL DOSEPACK) 4 MG tablet Take 4 mg by mouth See Admin Instructions Take by mouth.    Yes Historical Provider, MD   bisacodyl (DULCOLAX) 10 MG suppository Place 10 mg rectally daily   Yes Historical Provider, MD   spironolactone (ALDACTONE) 25 MG tablet Take 25 mg by mouth daily   Yes Historical Provider, MD   polyethylene glycol (GLYCOLAX) 17 GM/SCOOP powder 17 g by Per G Tube route daily   Yes Historical Provider, MD   warfarin (COUMADIN) 6 MG tablet Take 6 mg by mouth   Yes Historical Provider, MD      Current Facility-Administered Medications: heparin 25,000 units in dextrose 5 % 250 mL infusion (rate based), 5-30 Units/kg/hr, IntraVENous, Continuous  insulin lispro (HUMALOG) injection vial 0-8 Units, 0-8 Units, SubCUTAneous, 4x Daily AC & HS  clindamycin (CLEOCIN) 600 mg in dextrose 5 % 50 mL IVPB, 600 mg, IntraVENous, Q8H  HYDROcodone-acetaminophen (NORCO) 5-325 MG per tablet 1 tablet, 1 tablet, Oral, Q6H PRN  sodium chloride flush 0.9 % injection 5-40 mL, 5-40 mL, IntraVENous, 2 times per day  sodium chloride flush 0.9 % injection 5-40 mL, 5-40 mL, IntraVENous, PRN  0.9 % sodium chloride infusion, , IntraVENous, PRN  ondansetron (ZOFRAN-ODT) disintegrating tablet 4 mg, 4 mg, Oral, Q8H PRN **OR** ondansetron (ZOFRAN) injection 4 mg, 4 mg, IntraVENous, Q6H PRN  polyethylene glycol (GLYCOLAX) packet 17 g, 17 g, Oral, Daily PRN  acetaminophen (TYLENOL) tablet 650 mg, 650 mg, Oral, Q6H PRN **OR** acetaminophen (TYLENOL) suppository 650 mg, 650 mg, Rectal, Q6H PRN  pantoprazole (PROTONIX) 40 mg in sodium chloride (PF) 10 mL injection, 40 mg, IntraVENous, Daily  glucose chewable tablet 16 g, 4 tablet, Oral, PRN  dextrose bolus 10% 125 mL, 125 mL, IntraVENous, PRN **OR** dextrose bolus 10% 250 mL, 250 mL, IntraVENous, PRN  glucagon (rDNA) injection 1 mg, 1 mg, SubCUTAneous, PRN  dextrose 10 % infusion, , IntraVENous, Continuous PRN  cefepime (MAXIPIME) 2,000 mg in sterile water 20 mL IV syringe, 2,000 mg, IntraVENous, Q12H  hydrocortisone sodium succinate PF (SOLU-CORTEF) injection 50 mg, 50 mg, IntraVENous, Q8H  0.9 % sodium chloride infusion, , IntraVENous, Continuous  polyethylene glycol (GLYCOLAX) packet 17 g, 17 g, Per G Tube, BID  magnesium hydroxide (MILK OF MAGNESIA) 400 MG/5ML suspension 30 mL, 30 mL, Per G Tube, BID  phenylephrine (STEFFEN-SYNEPHRINE) 50 mg/250 mL infusion,  mcg/min, IntraVENous, Continuous  heparin (porcine) injection 4,000 Units, 4,000 Units, IntraVENous, PRN  heparin (porcine) injection 2,000 Units, 2,000 Units, IntraVENous, PRN    Allergies:  Penicillins    Social History:        Family History: family history is not on file. REVIEW OF SYSTEMS:    Constitutional: there has been no unanticipated weight loss. There's been No change in energy level, No change in activity level. Eyes: No visual changes or diplopia. No scleral icterus. ENT: No Headaches  Cardiovascular: HFrEF  Respiratory: No previous pulmonary problems, No cough  Gastrointestinal: No abdominal pain. No change in bowel or bladder habits. Genitourinary: No dysuria, trouble voiding, or hematuria. Musculoskeletal:  As above  Psychiatric: Yes for anxiety and depression. Endocrine: No temperature intolerance. No excessive thirst, fluid intake, or urination. No tremor. Hematologic/Lymphatic: No abnormal bruising or bleeding, blood clots or swollen lymph nodes. Allergic/Immunologic: No nasal congestion or hives.       PHYSICAL EXAM:      BP 86/66   Pulse (!) 112   Temp 98.2 °F (36.8 °C) (Oral)   Resp 17   Ht 6' 3\" (1.905 m)   Wt 156 lb 8.4 oz (71 kg)   SpO2 96%   BMI 19.56 kg/m²    Constitutional and General Appearance: alert, cooperative, no distress and appears stated age  HEENT: PERRL, no cervical lymphadenopathy. No masses palpable. Normal oral mucosa  Respiratory:  Normal excursion and expansion without use of accessory muscles  Resp Auscultation: Good respiratory effort. No for increased work of breathing. On auscultation: clear to auscultation bilaterally  Cardiovascular: The apical impulse is not displaced  Heart tones are crisp and normal. regular S1 and S2.  Jugular venous pulsation Normal  The carotid upstroke is normal in amplitude and contour without delay or bruit  Peripheral pulses are symmetrical and full   Abdomen:   No masses or tenderness  Bowel sounds present  Extremities:  Right BKA  Neurological:  Alert and oriented. Moves all extremities well    DATA:    Diagnostics:    EKG: normal EKG, normal sinus rhythm, sinus tachycardia. ECHO: 01/29/2022   Left Ventricle: Left ventricle is mildly dilated. Systolic function is   severely decreased with an ejection fraction of 10-15%. Pulmonic Valve: Pulmonic valve structure is grossly normal.     Tricuspid Valve: The right ventricular systolic pressure is moderately   elevated. RVSP calculated at 50 mmHg. RVSP is based on RA pressure of 5   mmHg. Stress Test: not obtained. Cardiac Angiography: not obtained.     Labs:     CBC:   Recent Labs     09/27/22 1938 09/28/22  0404   WBC 34.7* 31.2*   HGB 8.9* 9.7*   HCT 26.4* 29.2*    227     BMP:   Recent Labs     09/27/22  1230 09/27/22  1421 09/28/22  0404   *  --  130*   K 4.8  --  4.4   CO2 25  --  19*   BUN 56*  --  55*   CREATININE 2.04*  --  2.38*   LABGLOM 33*  --  27*   GLUCOSE 407* 356 196*       PT/INR:   Recent Labs     09/27/22  1230   PROTIME 15.9*   INR 1.6     APTT:  Recent Labs     09/27/22 1938 09/28/22  0747   APTT 32.4* 35.5*     LIVER PROFILE:  Recent Labs     09/27/22  1230   AST 14   ALT 9   LABALBU 2.9*       IMPRESSION:    Septic shock likely from chronic wound on left foot on Phenylephrine   Elevated troponin likely demand ischemia from shock and tachycardia   HFrEF on entresto 24-26, aldacatone 25, coreg 6.25 BID. No previous ischemic workup done   EDELMIRA on CKD  Oropharyngeal dysphagia s/p PEG   S/p right BKA 18/19/8740  Acute metabolic encephalopathy  Remote history of ischemic stroke with hemorrhagic transformation. Leukocytosis  Type 2 diabetes mellitus    RECOMMENDATIONS:  Elevated troponin likely demand ischemia, shock , increased heart rate. Anti-hypertensive are on hold due to sepsis   Surgical evaluation for possible amputation. Patient is high risk for the procedure given history of low EF. His RCRI score is 4 which is 15% risk of death, MI or cardiac arrest in 30 days. Continue supportive care   Will plan for ischemic workup with cardiac cath once acute issue resolve. Rest of the management per primary       Discussed with patient and Nurse. Felisha Rodriguez MD  Internal Medicine Resident PGY [facility]       Attending Physician Statement  I have discussed the case of Giovanna Pagan including pertinent history and exam findings with the resident. I have seen and examined the patient and the key elements of the encounter have been performed by me. I agree with the assessment, plan and orders as documented by the resident With changes made to the note.      Electronically signed by Cinthya Manning MD on 9/28/2022 at 3:37 PM.    [facility]      [phone]

## 2022-09-28 NOTE — FLOWSHEET NOTE
Patient transferred to [facility] by life flight at [phone]. Report given to Zaria Russell at [facility], all questions were answered. Patient left with all belongings. Patient also updated his family.

## 2022-09-28 NOTE — PROGRESS NOTES
Progress Note  Podiatric Medicine and Surgery     Subjective     Chief Complaint: Left foot pain    Interval history:  Pt seen at bedside. Strikethrough noted through dressings  Hypotensive this morning    HPI:  Ana Washington is a 79 y.o. male seen at [facility] ED for left foot pain with discoloration to his left foot. Patient is staying at a facility where they noticed the discoloration a few days ago. Upon getting the ED patient had a episode of hyperglycemia and altered mentation. 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 Upon arrival to the ER patient was initially worked up for concern of sepsis. Patient states that he has seen a vascular surgeon with [facility] and they had planned to work on his left lower extremity. Patient currently states that he has shakes, fatigue. Constitutional symptoms with feverish and chills. PCP is KATIE Saldaña MD    Past Medical History   has no past medical history on file. Past Surgical History   has no past surgical history on file. Medications  Prior to Admission medications    Medication Sig Start Date End Date Taking? Authorizing Provider   bumetanide (BUMEX) 1 MG tablet Take 1 mg by mouth daily   Yes Historical Provider, MD   ammonium lactate (AMLACTIN) 12 % cream Apply topically as needed for Dry Skin Apply topically as needed. Yes Historical Provider, MD   senna-docusate (PERICOLACE) 8.6-50 MG per tablet Take 1 tablet by mouth daily   Yes Historical Provider, MD   HYDROcodone-acetaminophen (NORCO) 5-325 MG per tablet Take 1 tablet by mouth every 6 hours as needed for Pain.    Yes Historical Provider, MD   midodrine (PROAMATINE) 5 MG tablet Take 10 mg by mouth daily   Yes Historical Provider, MD   magnesium hydroxide (MILK OF MAGNESIA CONCENTRATE) 2400 MG/10ML SUSP Take 2,400 mg by mouth once as needed   Yes Historical Provider, MD   methylPREDNISolone (MEDROL DOSEPACK) 4 MG tablet Take 4 mg by mouth See Admin Instructions Take by mouth. Yes Historical Provider, MD   bisacodyl (DULCOLAX) 10 MG suppository Place 10 mg rectally daily   Yes Historical Provider, MD   spironolactone (ALDACTONE) 25 MG tablet Take 25 mg by mouth daily   Yes Historical Provider, MD   polyethylene glycol (GLYCOLAX) 17 GM/SCOOP powder 17 g by Per G Tube route daily   Yes Historical Provider, MD   warfarin (COUMADIN) 6 MG tablet Take 6 mg by mouth   Yes Historical Provider, MD    Scheduled Meds:   insulin lispro  0-8 Units SubCUTAneous 4x Daily AC & HS    sodium chloride  500 mL IntraVENous Once    clindamycin (CLEOCIN) IV  600 mg IntraVENous Q8H    sodium chloride flush  5-40 mL IntraVENous 2 times per day    pantoprazole (PROTONIX) 40 mg injection  40 mg IntraVENous Daily    cefepime  2,000 mg IntraVENous Q12H    hydrocortisone sodium succinate PF  50 mg IntraVENous Q8H    polyethylene glycol  17 g Per G Tube BID    magnesium hydroxide  30 mL Per G Tube BID     Continuous Infusions:   heparin (PORCINE) Infusion 14 Units/kg/hr (09/28/22 1040)    sodium chloride      dextrose      sodium chloride Stopped (09/28/22 0939)    phenylephrine 60 mcg/min (09/28/22 1040)     PRN Meds:. HYDROcodone 5 mg - acetaminophen, sodium chloride flush, sodium chloride, ondansetron **OR** ondansetron, polyethylene glycol, acetaminophen **OR** acetaminophen, glucose, dextrose bolus **OR** dextrose bolus, glucagon (rDNA), dextrose, heparin (porcine), heparin (porcine)    Allergies  is allergic to penicillins. Family History  family history is not on file. Social History   has no history on file for tobacco use.   has no history on file for alcohol use.   has no history on file for drug use.     Objective     Vitals:  Patient Vitals for the past 8 hrs:   BP Temp Temp src Pulse Resp SpO2 Weight   09/28/22 1100 (!) 87/55 -- -- (!) 108 20 95 % --   09/28/22 1045 99/86 -- -- (!) 109 20 97 % --   09/28/22 1030 (!) 78/55 -- -- (!) 105 18 94 % -- 22 1015 (!) 82/52 -- -- (!) 107 18 97 % --   22 1000 (!) 83/58 -- -- 100 20 96 % --   22 0945 (!) 86/52 -- -- (!) 108 19 95 % --   22 (!) 80/56 -- -- (!) 110 -- 96 % --   22 98.2 °F (36.8 °C) Oral (!) 112 17 96 % --   22 09 -- -- -- -- -- --   22 -- -- (!) 109 18 94 % --   22 -- -- -- (!) 108 18 94 % --   22 -- -- -- (!) 109 19 95 % --   22 -- -- -- (!) 110 17 95 % --   22 08/ -- -- (!) 109 23 95 % --   22 07/ -- -- (!) 112 20 97 % --   22/ -- -- (!) 109 19 96 % --   22 -- -- (!) 110 19 96 % --   22 07 -- -- (!) 112 19 96 % --   22/ -- -- (!) 111 22 96 % --   22 -- -- (!) 112 21 95 % --   22 -- -- 97 18 97 % --   22 97.8 °F (36.6 °C) Oral (!) 114 20 97 % 156 lb 8.4 oz (71 kg)   22 0545  -- -- (!) 113 21 97 % --   22 -- -- (!) 112 22 97 % --   22 0515 87/62 -- -- (!) 112 20 95 % --   22 0500 92/69 -- -- (!) 113 19 96 % --   22 0445 92/64 -- -- (!) 110 18 96 % --   22 0430 94/66 -- -- (!) 111 19 97 % --   22 0415 88/63 -- -- (!) 112 20 97 % --   22 0400 91/64 -- -- (!) 112 21 93 % --   22 0345 86/ -- -- (!) 112 22 97 % --   22 0330 90/61 -- -- (!) 113 21 97 % --       Average, Min, and Max for last 24 hours Vitals:  TEMPERATURE:  Temp  Av.3 °F (36.8 °C)  Min: 97.8 °F (36.6 °C)  Max: 99.1 °F (37.3 °C)    RESPIRATIONS RANGE: Resp  Av.5  Min: 12  Max: 25    PULSE RANGE: Pulse  Av  Min: 0  Max: 128    BLOOD PRESSURE RANGE:  Systolic (64UIK), HVM:02 , Min:63 , XKR:879   ; Diastolic (96ZNE), TDS:75, Min:47, Max:86      PULSE OXIMETRY RANGE: SpO2  Av.2 %  Min: 78 %  Max: 98 %  I&O:  I/O last 3 completed shifts:   In: 660.7 [I.V.:660.7]  Out: 200 [Urine:200]    CBC:  Recent Labs 09/27/22  1230 09/27/22  1938 09/28/22  0404   WBC 23.9* 34.7* 31.2*   HGB 9.6* 8.9* 9.7*   HCT 28.7* 26.4* 29.2*    223 227          BMP:  Recent Labs     09/27/22  1230 09/27/22  1421 09/28/22  0404   *  --  130*   K 4.8  --  4.4   CL 86*  --  93*   CO2 25  --  19*   BUN 56*  --  55*   CREATININE 2.04*  --  2.38*   GLUCOSE 407* 356 196*   CALCIUM 8.4*  --  8.8          Coags:  Recent Labs     09/27/22  1230 09/27/22  1938 09/28/22  0747   APTT 31.3* 32.4* 35.5*   PROT 6.6  --   --    INR 1.6  --   --          No results found for: LABA1C  Lab Results   Component Value Date    SEDRATE 54 (H) 09/28/2022     No results found for: CRP      Physical Exam:  Vascular: DP and PT pulses are nonpalpable, nondopplarable. CFT > 3 seconds  brisk to all digits. Hair growth is absent to the level of the digits. No edema. Dusky discoloration dorsal midfoot w gangrenous changes. Neuro: Saph/sural/SP/DP/plantar sensation intact to light touch. Patient shaking left leg during examination. Musculoskeletal: Muscle strength is 4/5 to all lower extremity muscle groups. Gross deformity is present to right lower extremity with BKA. POP to left calcaneus. Compartments are non-compressible due to small size of lower extremity. No pain with compression of posterior calf. Dermatologic: Necrosis to left fourth digit with discoloration along the plantar pulp of the metatarsal heads medially with fluctuance. .  Atrophic and sloughing of the plantar aspect of the fourth digit. Dorsally necrosis migrating proximally along the dorsal aspect of the midfoot. Fourth digit does not probe to bone, sinus track, or undermine. An attempt to probe difficult to break skin. No expressible purulence seen. Significant webspace maceration. Dry eschar lateral aspect fifth digit.   See clinical images below      Clinical:               Imaging:   CT ABDOMEN PELVIS W IV CONTRAST Additional Contrast? None   Final Result   Massive stool ball is noted within the colon and rectum. The stool bar exerts   significant mass effect on the prostate gland and urinary bladder. Bilateral fat containing inguinal hernias. Consolidative changes of the right lower lobe which does not have typical   appearance of atelectasis and may represent pneumonia. CT HEAD WO CONTRAST   Final Result      No evidence for acute intracranial hemorrhage, territorial infarction or   intracranial mass lesion. Moderate chronic microangiopathic ischemic disease. Chronic encephalomalacia   involving the anterior aspect of right corona radiata. Chronic   encephalomalacia involving right medial occipital lobe. Mild generalized volume loss. XR CHEST PORTABLE   Final Result   1. No acute process. 2. Suspected small nodule of the right upper lung. Recommend nonemergent   noncontrast chest CT. XR FOOT LEFT (MIN 3 VIEWS)   Final Result   Findings suspicious for osteomyelitis of the middle phalanx of the small toe. VL LOWER EXTREMITY ARTERIAL SEGMENTAL PRESSURES W PPG    (Results Pending)   MRI FOOT LEFT WO CONTRAST    (Results Pending)       Cultures:   2x plantar L foot aerobic and anaerobic    Assessment     Gretta Shock is a 79 y.o. male with   Wet gangrene with necrosis, fourth digit left foot  PAD, left    Principal Problem:    Sepsis ([address]  Active Problems:    Septic shock ([address]  Resolved Problems:    * No resolved hospital problems. *        Plan     Patient examined and evaluated at bedside. Treatment options discussed in detail with the patient. Plain film radiographs ordered. Findings discussed in detail with the patient. Findings suspicious for osteomyelitis of the middle phalanx of the small fifth digit  MRI left LE ordered to rule out deep abscess/osteomyelitis.   Extensive discussion of x-ray findings, in conjunction with review of medical charts and the lack of blood flow to foot via Doppler examination of his EXTR discussed with patient that there is a likelihood that he would lose some of his digits if not the entirety of his foot. Explained to patient that there is need for blood flow to reperfuse the area if it were to heal adequately. Patient is amenable to treatment plan. Appreciate ID recs  Abx: Meropenem, vancomycin  Aerobic anaerobic culture of fourth digit taken. Pending  Appreciate vascular recommendations. NIVS ordered due to diminished peripheral pulses and inability to to Doppler left foot and distal calf. No signs of venous or arterial flow via Doppler. We have a plan for amputation of left 4th and 5th digits but wanted to see a plan by vascular for revascular. Plan for admission to ICU for IV abx, medical management of sepsis per Internal Medicine. Dressing applied to Left foot: Betadine wet-to-dry gauze, DSD, Kerlix  NWB to Left lower extremity. Discussed with  Dr. Rashaun Andrews. Kira Richard DPM   Podiatric Medicine & Surgery   9/28/2022 at 11:15 AM      This note is created with the assistance of a speech recognition program.  While intending to generate a document that actually reflects the content of the visit, the document can still have some errors including those of syntax and sound a like substitutions which may escape proof reading. In such instances, actual meaning can be extrapolated by contextual diversion.

## 2022-09-28 NOTE — CONSULTS
Comprehensive Nutrition Assessment    Type and Reason for Visit:  Consult    Nutrition Recommendations/Plan:   Continue NPO. Restart oral diet as tolerated  If TF, recommend Diabetic formula with a goal rate of 50 ml/hr. If oral diet, recommend High protein/high calorie ONS with meals. Malnutrition Assessment:  Malnutrition Status:  No malnutrition (09/28/22 1417)        Nutrition Assessment:    Patient admitted with sepsis. He reports his has been on TF in the past but has upgraded to a regular diet. Patient has wounds to LLE and right BKA. Patient thinks he was receiving a diabetic formula when he was receiving TF. Nutrition Related Findings:    Labs/meds reviewed Wound Type: Open Wounds, Multiple       Current Nutrition Intake & Therapies:    Average Meal Intake: NPO  Average Supplements Intake: NPO  No diet orders on file    Anthropometric Measures:  Height: 6' 3\" (190.5 cm)  Ideal Body Weight (IBW): 196 lbs (89 kg)       Current Body Weight: 156 lb (70.8 kg), 79.6 % IBW.  Weight Source: Bed Scale  Current BMI (kg/m2): 19.5        Weight Adjustment For: Amputation  Total Adjusted Percentage (Calculated): 5.9  Adjusted Ideal Body Weight (lbs) (Calculated): 184.4 lbs  Adjusted Ideal Body Weight (kg) (Calculated): 83.82 kg  Adjusted % Ideal Body Weight (Calculated): 84.6  Adjusted BMI (kg/m2) (Calculated): 20.7  BMI Categories: Underweight (BMI less than 22) age over 72    Estimated Daily Nutrient Needs:  Energy Requirements Based On: Kcal/kg  Weight Used for Energy Requirements: Current  Energy (kcal/day): 6533-7342  Weight Used for Protein Requirements: Current  Protein (g/day):  gm  Method Used for Fluid Requirements: ml/Kg  Fluid (ml/day): 1900 mL or per MD    Nutrition Diagnosis:   Inadequate oral intake related to increase demand for energy/nutrients as evidenced by wounds    Nutrition Interventions:   Food and/or Nutrient Delivery: Continue NPO  Nutrition Education/Counseling: No recommendation at this time  Coordination of Nutrition Care: Continue to monitor while inpatient       Goals:     Goals: Meet at least 75% of estimated needs       Nutrition Monitoring and Evaluation:   Behavioral-Environmental Outcomes: None Identified  Food/Nutrient Intake Outcomes: Diet Advancement/Tolerance, Enteral Nutrition Intake/Tolerance  Physical Signs/Symptoms Outcomes: Biochemical Data, Chewing or Swallowing, GI Status, Weight, Skin    Discharge Planning:     Too soon to determine     James Iraheta [address]  Contact: [phone]

## 2022-09-28 NOTE — ED NOTES
Dr. Tg Lopez and Dr. Wally Goncalves at bedside for central line placement.  Pt tolerating well     Erinn Chicas, RN  [phone]

## 2022-09-28 NOTE — CONSULTS
[facility] -   Infectious diseases evaluation  admission date 9/27/2022    reason for consultation:   Osteomyelitis of Left foot     Impression :   Current:  Diabetic Cellulitis and fasciitis / OM of left Foot   Bandemia  Group A strep pyogenes,septicemia  Allergy to penicillin is rash    Podiatry on board     Other:  History of diabetes  Discussion / summary of stay / plan of care     Recommendations   Cefepime 2 g twice daily and iv clindamycin to cover for group A septicemia  Patient's allergy to penicillin is rash  DC vancomycin due to elevated creatinine  X-ray showed osteo  Follow-up MRI today    Infection Control Recommendations   [place] Precautions    Antimicrobial Stewardship Recommendations   Simplification of therapy  Targeted therapy    History of Present Illness:   Initial history:  Shara Vaca is a 79y.o.-year-old male with history of A. fib on Coumadin, diabetes, right BKA, CVA, PEG tube. Who presented to the emergency department from a facility with hyperglycemia and altered mental status. EMS reported blood sugars in the 400s for the past day, hypotensive in route to the emergency department. Upon arrival patient was alert and oriented x3. Patient states that he had nausea and vomiting and abdominal discomfort prior to arriving.     X-ray showed osteo-  MRI ordered follow-up    Interval changes  9/28/2022   Patient Vitals for the past 8 hrs:   BP Temp Temp src Pulse Resp SpO2 Weight   09/28/22 0915 86/66 98.2 °F (36.8 °C) Oral (!) 112 17 96 % --   09/28/22 0904 92/65 -- -- -- -- -- --   09/28/22 0645 85/64 -- -- (!) 111 22 96 % --   09/28/22 0630 88/65 -- -- (!) 112 21 95 % --   09/28/22 0615 92/66 -- -- 97 18 97 % --   09/28/22 0600 93/63 97.8 °F (36.6 °C) Oral (!) 114 20 97 % 156 lb 8.4 oz (71 kg)   09/28/22 0545 93/66 -- -- (!) 113 21 97 % --   09/28/22 0530 89/67 -- -- (!) 112 22 97 % --   09/28/22 0515 87/62 -- -- (!) 112 20 95 % -- 09/28/22 0500 92/69 -- -- (!) 113 19 96 % --   09/28/22 0445 92/64 -- -- (!) 110 18 96 % --   09/28/22 0430 94/66 -- -- (!) 111 19 97 % --   09/28/22 0415 88/63 -- -- (!) 112 20 97 % --   09/28/22 0400 91/64 -- -- (!) 112 21 93 % --   09/28/22 0345 86/62 -- -- (!) 112 22 97 % --   09/28/22 0330 90/61 -- -- (!) 113 21 97 % --   09/28/22 0315 95/64 -- -- (!) 111 21 97 % --   09/28/22 0300 89/63 -- -- (!) 110 20 97 % --   09/28/22 0245 91/60 -- -- (!) 114 25 97 % --   09/28/22 0230 96/68 -- -- (!) 115 16 (!) 78 % --   09/28/22 0224 -- -- -- -- 21 -- --   09/28/22 0215 87/65 -- -- (!) 113 21 92 % --   09/28/22 0200 87/63 98.1 °F (36.7 °C) Oral (!) 115 19 98 % --       Summary of relevant labs:  Labs:  WBC: 31.2  Creatinine: 2.04-2.38  CRP: 374  Sed rate: 54    Micro:  UA with micro: Many bacteria, no leukocyte esterase  Wound culture: Moderate gram-positive cocci in pairs, group A strep  Blood culture x2: Gram-positive cocci in chains    Imaging:  X-ray: Concerning for osteo-  MRI ordered: Follow-up    I have personally reviewed the past medical history, past surgical history, medications, social history, and family history, and I haveupdated the database accordingly. Allergies:   Penicillins     Review of Systems:     Review of Systems   Constitutional:  Negative for chills and fever. HENT:  Negative for congestion and rhinorrhea. Eyes:  Negative for visual disturbance. Respiratory:  Negative for cough and chest tightness. Cardiovascular:  Negative for chest pain and leg swelling. Gastrointestinal:  Negative for abdominal distention and abdominal pain. Endocrine: Negative for cold intolerance and heat intolerance. Genitourinary:  Negative for dysuria. Harris    Musculoskeletal:  Negative for joint swelling. Skin:  Positive for wound. Left toes    Allergic/Immunologic: Negative for immunocompromised state. Neurological:  Negative for headaches.    Hematological:  Negative for adenopathy. Psychiatric/Behavioral:  Negative for agitation. Physical Examination :       Physical Exam  Constitutional:       Appearance: He is ill-appearing. HENT:      Head: Normocephalic and atraumatic. Right Ear: External ear normal.      Left Ear: External ear normal.      Nose: Nose normal. No congestion. Mouth/Throat:      Mouth: Mucous membranes are moist.      Pharynx: Oropharynx is clear. Eyes:      Extraocular Movements: Extraocular movements intact. Conjunctiva/sclera: Conjunctivae normal.      Pupils: Pupils are equal, round, and reactive to light. Cardiovascular:      Rate and Rhythm: Regular rhythm. Tachycardia present. Pulses: Normal pulses. Heart sounds: Normal heart sounds. Pulmonary:      Effort: Pulmonary effort is normal. No respiratory distress. Breath sounds: Normal breath sounds. Abdominal:      General: Abdomen is flat. Bowel sounds are normal. There is no distension. Palpations: Abdomen is soft. Tenderness: There is no abdominal tenderness. Genitourinary:     Comments: Harris   Musculoskeletal:      Cervical back: Normal range of motion. No rigidity. Comments: Left foot wound, R leg BKA    Skin:     General: Skin is warm. Capillary Refill: Capillary refill takes less than 2 seconds. Findings: Erythema and lesion present. Neurological:      General: No focal deficit present. Mental Status: He is alert and oriented to person, place, and time. Psychiatric:         Mood and Affect: Mood normal.         Behavior: Behavior normal.       Past Medical History:   No past medical history on file. Past Surgical  History:   No past surgical history on file.     Medications:      insulin lispro  0-8 Units SubCUTAneous 4x Daily AC & HS    vancomycin  1,000 mg IntraVENous Once    sodium chloride  500 mL IntraVENous Once    sodium chloride flush  5-40 mL IntraVENous 2 times per day    pantoprazole (PROTONIX) 40 mg injection  40 mg IntraVENous Daily    cefepime  2,000 mg IntraVENous Q12H    hydrocortisone sodium succinate PF  50 mg IntraVENous Q8H    polyethylene glycol  17 g Per G Tube BID    magnesium hydroxide  30 mL Per G Tube BID       Social History:     Social History     Socioeconomic History    Marital status: Single     Spouse name: Not on file    Number of children: Not on file    Years of education: Not on file    Highest education level: Not on file   Occupational History    Not on file   Tobacco Use    Smoking status: Not on file    Smokeless tobacco: Not on file   Substance and Sexual Activity    Alcohol use: Not on file    Drug use: Not on file    Sexual activity: Not on file   Other Topics Concern    Not on file   Social History Narrative    Not on file     Social Determinants of Health     Financial Resource Strain: Not on file   Food Insecurity: Not on file   Transportation Needs: Not on file   Physical Activity: Not on file   Stress: Not on file   Social Connections: Not on file   Intimate Partner Violence: Not on file   Housing Stability: Not on file       Family History:   No family history on file. Medical Decision Making:   I have independently reviewed/ordered the following labs:    CBC with Differential:   Recent Labs     09/27/22  1230 09/27/22  1938 09/28/22  0404   WBC 23.9* 34.7* 31.2*   HGB 9.6* 8.9* 9.7*   HCT 28.7* 26.4* 29.2*    223 227   LYMPHOPCT 2*  --  1*   MONOPCT 8*  --  7     BMP:  Recent Labs     09/27/22  1230 09/28/22  0404   * 130*   K 4.8 4.4   CL 86* 93*   CO2 25 19*   BUN 56* 55*   CREATININE 2.04* 2.38*     Hepatic Function Panel:   Recent Labs     09/27/22  1230   PROT 6.6   LABALBU 2.9*   BILITOT 0.4   ALKPHOS 119   ALT 9   AST 14     No results for input(s): RPR in the last 72 hours. No results for input(s): HIV in the last 72 hours. No results for input(s): BC in the last 72 hours.   Lab Results   Component Value Date/Time    CREATININE 2.38 09/28/2022 04:04 AM    GLUCOSE 196 09/28/2022 04:04 AM       Detailed results: Thank you for allowing us to participate in the care of this patient. Please call with questions. This note is created with the assistance of a speech recognition program.  While intending to generate adocument that actually reflects the content of the visit, the document can still have some errors including those of syntax and sound a like substitutions which may escape proof reading. It such instances, actual meaningcan be extrapolated by contextual diversion. Divya Brand MD  Office: [phone]  Perfect serve / office [phone]      I have discussed the care of the patient, including pertinent history and exam findings,  with the resident. I have seen and examined the patient and the key elements of all parts of the encounter have been performed by me. I agree with the assessment, plan and orders as documented by the resident.     Kae Kwan, Infectious Diseases

## 2022-09-28 NOTE — CONSULTS
Division of Vascular Surgery          Vascular Consult      Name: Giovanna Pagan  MRN: [id number]       Physician Requesting Consult:  Dr. Clarice Kurtz    Reason for Consult:   Left foot non healing ulcer    Chief Complaint:      Pain and non healing wound in left foot    History of Present Illness:      Giovanna Pagan is a 79 y.o.  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 The pain increases on hanging the leg by the side of the bed. It gets relieved on keeping the leg on the bed. Patient also noticed increasing discharge from these wounds over the past 15 to 20 days. There was no complaint of any fever, weakness, numbness in the left foot. There is no complaint of any chest pain, shortness of breath, intermittent claudication, trauma to the left foot. Patient has been a regular follow-up in the wound clinic and vascular surgeon for management of left foot wound. Currently it seems like there is a wedge infarct of left fourth digit along with wet gangrene. His last angiogram was on 5/2022 where report shows \"On the left side common femoral artery on the left side as well as the deep femoral arteries are patent. Superficial femoral artery is occluded from the origin. Proximal and mid popliteal artery on the left side are occluded. Popliteal artery reconstituted at the knee level area. Popliteal artery is irregular and calcified but it is patent. The TP trunk appears to be occluded. Anterior tibial artery appears to be irregular and calcified but it is patent all the way down to the foot.  We do not see the posterior tibial or the peroneal arteries which most probably because they would be occluded. \"    Past Medical History:     No past medical history on file. Past Surgical History:     No past surgical history on file. Medications Prior to Admission:       Prior to Admission medications    Medication Sig Start Date End Date Taking? Authorizing Provider   bumetanide (BUMEX) 1 MG tablet Take 1 mg by mouth daily   Yes Historical Provider, MD   ammonium lactate (AMLACTIN) 12 % cream Apply topically as needed for Dry Skin Apply topically as needed. Yes Historical Provider, MD   senna-docusate (PERICOLACE) 8.6-50 MG per tablet Take 1 tablet by mouth daily   Yes Historical Provider, MD   HYDROcodone-acetaminophen (NORCO) 5-325 MG per tablet Take 1 tablet by mouth every 6 hours as needed for Pain. Yes Historical Provider, MD   midodrine (PROAMATINE) 5 MG tablet Take 10 mg by mouth daily   Yes Historical Provider, MD   magnesium hydroxide (MILK OF MAGNESIA CONCENTRATE) 2400 MG/10ML SUSP Take 2,400 mg by mouth once as needed   Yes Historical Provider, MD   methylPREDNISolone (MEDROL DOSEPACK) 4 MG tablet Take 4 mg by mouth See Admin Instructions Take by mouth. Yes Historical Provider, MD   bisacodyl (DULCOLAX) 10 MG suppository Place 10 mg rectally daily   Yes Historical Provider, MD   spironolactone (ALDACTONE) 25 MG tablet Take 25 mg by mouth daily   Yes Historical Provider, MD   polyethylene glycol (GLYCOLAX) 17 GM/SCOOP powder 17 g by Per G Tube route daily   Yes Historical Provider, MD   warfarin (COUMADIN) 6 MG tablet Take 6 mg by mouth   Yes Historical Provider, MD        Allergies:       Penicillins    Social History:     Tobacco:    has no history on file for tobacco use. Alcohol:      has no history on file for alcohol use. Drug Use:  has no history on file for drug use. Family History:     No family history on file.     Review of Systems:     Positive and Negative as described in HPI      Constitutional:  negative for  fevers, chills, sweats, fatigue, and weight loss  HEENT: negative for vision or hearing changes,   Respiratory:  negative for shortness of breath, cough, or congestion  Cardiovascular:  negative for  chest pain, palpitations  Gastrointestinal:  negative for nausea, vomiting, diarrhea, constipation, abdominal pain  Genitourinary:  negative for frequency, dysuria  Integument:  negative for rash, skin lesions  Chest/Breast:  No painful inspiration or expiration, no rib sternal pain  Musculoskeletal:  negative for muscle aches or joint pain  Neurological:  negative for headaches, dizziness, lightheadedness.  pain and tingling in extremities   Lymphatics: no lymphadenopathy or painful masses  Behavior/Psych:  negative for depression and anxiety    Physical Exam:     Vitals:  BP 96/63   Pulse (!) 114   Temp 99.1 °F (37.3 °C) (Oral)   Resp 15   Ht 6' 3\" (1.905 m)   Wt 150 lb (68 kg)   SpO2 97%   BMI 18.75 kg/m²       General appearance - alert, well appearing and in no acute distress  Mental status - oriented to person, place and time with normal affect  Head - normocephalic and atraumatic  Eyes - pupils equal and reactive, extraocular eye movements intact, conjunctiva clear  Ears - hearing appears to be intact  Nose - no drainage noted  Mouth - mucous membranes moist  Neck - supple, no carotid bruits, thyroid not palpable, no JVD  Chest - clear to auscultation, normal effort  Heart - normal rate, regular rhythm, no murmurs  Abdomen - soft, non-tender, non-distended, bowel sounds present all four quadrants, no masses, hepatomegaly, splenomegaly or aortic enlargement  Neurological - normal speech, no focal findings or movement disorder noted, cranial nerves II through XII grossly intact  Extremities - peripheral pulses palpable, no pedal edema or calf pain with palpation  Skin - no gross lesions, rashes, or induration noted  Foot Exam - all upper extremity pulses are palpable  Rightlower extremity pulses palpable till popliteal (Rt BKA)  Left lower extremity -    PT dopplerable (monophasic)   AT dopplerable (biphasic)   DP absent                   Imaging:     CT ABDOMEN PELVIS W IV CONTRAST Additional Contrast? None   Final Result   Massive stool ball is noted within the colon and rectum. The stool bar exerts   significant mass effect on the prostate gland and urinary bladder. Bilateral fat containing inguinal hernias. Consolidative changes of the right lower lobe which does not have typical   appearance of atelectasis and may represent pneumonia. CT HEAD WO CONTRAST   Final Result      No evidence for acute intracranial hemorrhage, territorial infarction or   intracranial mass lesion. Moderate chronic microangiopathic ischemic disease. Chronic encephalomalacia   involving the anterior aspect of right corona radiata. Chronic   encephalomalacia involving right medial occipital lobe. Mild generalized volume loss. XR CHEST PORTABLE   Final Result   1. No acute process. 2. Suspected small nodule of the right upper lung. Recommend nonemergent   noncontrast chest CT. XR FOOT LEFT (MIN 3 VIEWS)   Final Result   Findings suspicious for osteomyelitis of the middle phalanx of the small toe.          VL LOWER EXTREMITY ARTERIAL SEGMENTAL PRESSURES W PPG    (Results Pending)   MRI FOOT LEFT WO CONTRAST    (Results Pending)           Assessment:     Nelda Vale is a 79 y.o.  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\" on perfectserve for further questions and concerns      ----------------------------------------  Elvis Barr MD   PGY2  Vascular Surgery resident  Contact \"vascular surgery miguel resident\" on perfectserve for further questions and concerns      [email or web address]

## 2022-09-28 NOTE — CARE COORDINATION
Transitional planning. Dr. Sudhir Huynh informed CM that pt's Vascular physician Dr. Kadeem Singh at [facility] would like pt transferred to [facility].     [address] to [place] with [facility] [phone], provided her with requested information. She will call [facility] transfer center and initiate transfer process     1510 Pt's RN, Meredith Mccray informed CM that she was provided with a phone number [phone] and room number A 916 for pt. Called number provided, transferred to [facility] and informed that pt does have bed A 916. Report is to given to Jeimy. Pt's RN, Meredith Mccray informed of this. [facility] Informed Rea Antonio that pt has room, he will place dc order. He informed CM that Dr. Elmo Escalona is accepting physician. [phone] to Alicia Watson with Cardiac/vascular testing @ 6-2960, she will send all testing/results she has for pt admission to [facility] electronically. [phone] to [facility] in [address], she will send all testing/results she has for pt admission to [facility] electronically. Rosageosimin 16 Spoke to [employer] with [employer] requesting Mobile life flight transport to [facility], they will be here in 15 minutes. He asked that face sheet and Medical Necessity form be faxed to [phone] informed pt of transfer and acceptance. He is agreeable to transfer and will notify his brother. 1602 faxed medical necessity and face sheet to requested number above, spoke to [employer] with [facility] mobile life flight and informed him that accepting physician at [facility] is Dr. Elmo Escalona. 5071 informed Jeimy at [facility] that pt is transferring to [facility]. She will inform pt's nurse.

## 2022-09-28 NOTE — ED NOTES
[address] [facility]  Blood sugar 455     Angel Breana, [facility]  09/27/22 2110

## 2022-09-28 NOTE — PROGRESS NOTES
[address] [facility] Pharmacokinetic Monitoring Service - Vancomycin    Consulting Provider: Dr. Irvin [place]   Indication: Sepsis  Target Concentration: Dosing based on anticipated concentration < 15 mcg/mL due to renal impairment/insufficiency  Day of Therapy: 2  Additional Antimicrobials: Meropenem    Pertinent Laboratory Values: Wt Readings from Last 1 Encounters:   09/28/22 156 lb 8.4 oz (71 kg)     Temp Readings from Last 1 Encounters:   09/28/22 98.1 °F (36.7 °C) (Oral)     Recent Labs     09/27/22  1230 09/27/22  1938 09/28/22  0404   CREATININE 2.04*  --  2.38*   WBC 23.9* 34.7* 31.2*     Pertinent Cultures:  Culture Date Source Results   09.27 Blood x2 2/2 [facility]   MRSA Nasal Swab: N/A. Non-respiratory infection.     Recent vancomycin administrations                     vancomycin (VANCOCIN) 1000 mg in dextrose 5% 200 mL IVPB (mg) 1,000 mg New Bag 09/27/22 1439        Assessment:  Date/Time Current Dose Concentration Dialysis Session   09.28 1000mg x1 11.6 mcg/mL   ~ 14H post dose N/a     Plan:  Concentration-guided dosing due to renal impairment and intermittent hemodialysis   Will give patient additional 1000mg dose x1 today  Vancomycin concentration ordered with AM labs on 09.29  Pharmacy will continue to monitor patient and adjust therapy as indicated    Thank you for the consult,  Ag Naqvi PharmD [facility]  9/28/2022 7:15 AM

## 2022-09-28 NOTE — CARE COORDINATION
Received voicemail from admissions at [facility], patient is a bed hold.   Notified Carrie Hinds RN CM

## 2022-09-29 LAB
CULTURE: ABNORMAL
CULTURE: NO GROWTH
EKG ATRIAL RATE: 122 BPM
EKG ATRIAL RATE: 127 BPM
EKG P AXIS: 41 DEGREES
EKG P AXIS: 45 DEGREES
EKG P-R INTERVAL: 178 MS
EKG P-R INTERVAL: 180 MS
EKG Q-T INTERVAL: 288 MS
EKG Q-T INTERVAL: 362 MS
EKG QRS DURATION: 74 MS
EKG QRS DURATION: 84 MS
EKG QTC CALCULATION (BAZETT): 410 MS
EKG QTC CALCULATION (BAZETT): 526 MS
EKG R AXIS: -37 DEGREES
EKG R AXIS: -53 DEGREES
EKG T AXIS: -11 DEGREES
EKG T AXIS: -17 DEGREES
EKG VENTRICULAR RATE: 122 BPM
EKG VENTRICULAR RATE: 127 BPM
INTERVENTION: NORMAL
Lab: ABNORMAL
Lab: ABNORMAL
MRSA, DNA, NASAL: NEGATIVE
SPECIMEN DESCRIPTION: ABNORMAL
SPECIMEN DESCRIPTION: ABNORMAL
SPECIMEN DESCRIPTION: NORMAL
SPECIMEN DESCRIPTION: NORMAL

## 2022-09-29 PROCEDURE — 93010 ELECTROCARDIOGRAM REPORT: CPT | Performed by: INTERNAL MEDICINE

## 2022-09-29 NOTE — DISCHARGE SUMMARY
[address] [place] [address]     Department of Internal Medicine - Critical Care Service    INPATIENT DISCHARGE SUMMARY      PATIENT IDENTIFICATION:  NAME:  Gretta Pitt   :   1955  MRN:    [id number]     Acct:    [de-identified]   Admit Date:  2022  Discharge date:  2022  4:45 PM   Attending Provider: No att. providers found                                     Principal Problem:    Sepsis ([address]  Active Problems:    Septic shock ([address]  Resolved Problems:    * No resolved hospital problems. *       REASON FOR HOSPITALIZATION:   Chief Complaint   Patient presents with    Hyperglycemia    Fatigue          Hospital Course  Gretta Pitt is a 79 y.o. who was brought to ER from nursing facility for hypotension, hyperglycemia and some altered mentation. Patient has past medical history of recent right BKA for severe peripheral vascular disease,HFrEF (dilated cardiomyopathy) with EF 10 to 15%, recent ischemic stroke with hemorrhagic transformation, oropharyngeal dysphagia status post PEG tube placement, type 2 diabetes mellitus. On arrival to ER, patient was hypotensive, and tachycardic with concern of sepsis. Initial lab work-up showed leukocyte count of 23, creatinine 2.04, BUN 56, blood glucose 407, high-sensitivity troponins 120. Patient was given IV fluid boluses with some improvement in blood pressure but his MAP dropped again and he started requiring Levophed which was then switched to neosynpherine due to tachycardia. Patient had chronic nonhealing left foot wound. X-ray left foot showing osteomyelitis. Blood Cultures x2 grew streptococcus group A. Patient was started on antibiotics. Patient was also started on heparin drip due to elevated troponins and concern that patient has history of A. Fib (as patient was on Coumadin but it was unclear whether that was for severe peripheral arterial disease). Vascular surgery and podiatry evaluated the patient.   Vascular surgery talk to the patient's vascular surgeon at [facility] who recommended patient be transferred to [facility].  Patient was accepted at [facility] and was transferred in stable condition. At the time of transfer, patient was on Sigifredo-Synephrine for blood pressure support. He was alert and oriented x4, and without any acute distress. Consults:   Vascular surgery and Podiatry    Procedures:  Left femoral triple lumen    Any Hospital Acquired Infections: None    PATIENT'S DISCHARGE CONDITION:      PATIENT/FAMILY INSTRUCTIONS:   Discharge Medication List as of 9/28/2022  4:00 PM        CONTINUE these medications which have NOT CHANGED    Details   bumetanide (BUMEX) 1 MG tablet Take 1 mg by mouth dailyHistorical Med      ammonium lactate (AMLACTIN) 12 % cream Apply topically as needed for Dry Skin Apply topically as needed., Topical, PRN, Historical Med      senna-docusate (PERICOLACE) 8.6-50 MG per tablet Take 1 tablet by mouth dailyHistorical Med      HYDROcodone-acetaminophen (NORCO) 5-325 MG per tablet Take 1 tablet by mouth every 6 hours as needed for Pain. Historical Med      midodrine (PROAMATINE) 5 MG tablet Take 10 mg by mouth dailyHistorical Med      magnesium hydroxide (MILK OF MAGNESIA CONCENTRATE) 2400 MG/10ML SUSP Take 2,400 mg by mouth once as needed, Oral, ONCE PRN, Historical Med      methylPREDNISolone (MEDROL DOSEPACK) 4 MG tablet Take 4 mg by mouth See Admin Instructions Take by mouth. Historical Med      bisacodyl (DULCOLAX) 10 MG suppository Place 10 mg rectally dailyHistorical Med      spironolactone (ALDACTONE) 25 MG tablet Take 25 mg by mouth dailyHistorical Med      polyethylene glycol (GLYCOLAX) 17 GM/SCOOP powder 17 g by Per G Tube route dailyHistorical Med      warfarin (COUMADIN) 6 MG tablet Take 6 mg by mouthHistorical Med           Activity: activity as tolerated    Diet: diabetic diet    Disposition: Transferred to [address] [place] [address] [address], MD  Internal Medicine Resident  Critical Care Service

## 2022-10-02 LAB
CULTURE: ABNORMAL
DIRECT EXAM: ABNORMAL
DIRECT EXAM: ABNORMAL
SPECIMEN DESCRIPTION: ABNORMAL

## 2022-10-22 ENCOUNTER — HOSPITAL ENCOUNTER (OUTPATIENT)
Dept: MEDSURG UNIT | Age: 67
Discharge: HOME | End: 2022-10-22
Attending: INTERNAL MEDICINE | Admitting: INTERNAL MEDICINE

## 2022-10-23 LAB
ABSOLUTE EOS #: 0.15 K/UL (ref 0–0.4)
ABSOLUTE IMMATURE GRANULOCYTE: 0.15 K/UL (ref 0–0.3)
ABSOLUTE LYMPH #: 0.73 K/UL (ref 1–4.8)
ABSOLUTE MONO #: 1.6 K/UL (ref 0.2–0.8)
BASOPHILS # BLD: 0 %
BASOPHILS ABSOLUTE: 0 K/UL (ref 0–0.2)
EOSINOPHILS RELATIVE PERCENT: 1 % (ref 1–4)
HAV IGM SER IA-ACNC: NONREACTIVE
HBV SURFACE AB TITR SER: 34.35 MIU/ML
HCT VFR BLD CALC: 28.9 % (ref 40.7–50.3)
HEMOGLOBIN: 8.4 G/DL (ref 13–17)
HEPATITIS B CORE IGM ANTIBODY: NONREACTIVE
HEPATITIS B SURFACE ANTIGEN: NONREACTIVE
HEPATITIS C ANTIBODY: NONREACTIVE
IMMATURE GRANULOCYTES: 1 %
LYMPHOCYTES # BLD: 5 % (ref 24–44)
MCH RBC QN AUTO: 31 PG (ref 25.2–33.5)
MCHC RBC AUTO-ENTMCNC: 29.1 G/DL (ref 28.4–34.8)
MCV RBC AUTO: 106.6 FL (ref 82.6–102.9)
MONOCYTES # BLD: 11 % (ref 1–7)
MORPHOLOGY: ABNORMAL
NRBC AUTOMATED: 0.2 PER 100 WBC
PDW BLD-RTO: 19.6 % (ref 11.8–14.4)
PLATELET # BLD: 317 K/UL (ref 138–453)
PMV BLD AUTO: 11 FL (ref 8.1–13.5)
RBC # BLD: 2.71 M/UL (ref 4.21–5.77)
SEG NEUTROPHILS: 82 % (ref 36–66)
SEGMENTED NEUTROPHILS ABSOLUTE COUNT: 11.87 K/UL (ref 1.8–7.7)
WBC # BLD: 14.5 K/UL (ref 3.5–11.3)

## 2022-10-23 PROCEDURE — 80074 ACUTE HEPATITIS PANEL: CPT

## 2022-10-23 PROCEDURE — 85025 COMPLETE CBC W/AUTO DIFF WBC: CPT

## 2022-10-23 PROCEDURE — 86317 IMMUNOASSAY INFECTIOUS AGENT: CPT

## 2022-10-24 LAB
ABSOLUTE EOS #: 0.23 K/UL (ref 0–0.44)
ABSOLUTE IMMATURE GRANULOCYTE: 0.13 K/UL (ref 0–0.3)
ABSOLUTE LYMPH #: 0.86 K/UL (ref 1.1–3.7)
ABSOLUTE MONO #: 1.43 K/UL (ref 0.1–1.2)
BASOPHILS # BLD: 0 % (ref 0–2)
BASOPHILS ABSOLUTE: <0.03 K/UL (ref 0–0.2)
EOSINOPHILS RELATIVE PERCENT: 2 % (ref 1–4)
HCT VFR BLD CALC: 29.6 % (ref 40.7–50.3)
HEMOGLOBIN: 8.4 G/DL (ref 13–17)
IMMATURE GRANULOCYTES: 1 %
LYMPHOCYTES # BLD: 8 % (ref 24–43)
MCH RBC QN AUTO: 30.3 PG (ref 25.2–33.5)
MCHC RBC AUTO-ENTMCNC: 28.4 G/DL (ref 28.4–34.8)
MCV RBC AUTO: 106.9 FL (ref 82.6–102.9)
MONOCYTES # BLD: 14 % (ref 3–12)
NRBC AUTOMATED: 0.4 PER 100 WBC
PDW BLD-RTO: 19.7 % (ref 11.8–14.4)
PLATELET # BLD: 336 K/UL (ref 138–453)
PMV BLD AUTO: 11.2 FL (ref 8.1–13.5)
RBC # BLD: 2.77 M/UL (ref 4.21–5.77)
RBC # BLD: ABNORMAL 10*6/UL
SEG NEUTROPHILS: 75 % (ref 36–65)
SEGMENTED NEUTROPHILS ABSOLUTE COUNT: 7.74 K/UL (ref 1.5–8.1)
WBC # BLD: 10.4 K/UL (ref 3.5–11.3)

## 2022-10-24 PROCEDURE — 85025 COMPLETE CBC W/AUTO DIFF WBC: CPT

## 2022-10-25 LAB
FERRITIN: 528 NG/ML (ref 30–400)
FOLATE: 17.2 NG/ML
IRON SATURATION: 17 % (ref 20–55)
IRON: 49 UG/DL (ref 59–158)
TOTAL IRON BINDING CAPACITY: 290 UG/DL (ref 250–450)
UNSATURATED IRON BINDING CAPACITY: 241 UG/DL (ref 112–347)
VITAMIN B-12: 1392 PG/ML (ref 232–1245)

## 2022-10-25 PROCEDURE — 83540 ASSAY OF IRON: CPT

## 2022-10-25 PROCEDURE — 82607 VITAMIN B-12: CPT

## 2022-10-25 PROCEDURE — 82728 ASSAY OF FERRITIN: CPT

## 2022-10-25 PROCEDURE — 83550 IRON BINDING TEST: CPT

## 2022-10-25 PROCEDURE — 82746 ASSAY OF FOLIC ACID SERUM: CPT

## 2022-11-07 PROCEDURE — 83735 ASSAY OF MAGNESIUM: CPT

## 2022-11-07 PROCEDURE — 80053 COMPREHEN METABOLIC PANEL: CPT

## 2022-11-07 PROCEDURE — 84100 ASSAY OF PHOSPHORUS: CPT

## 2022-11-09 PROCEDURE — 87635 SARS-COV-2 COVID-19 AMP PRB: CPT

## 2022-11-14 ENCOUNTER — HOSPITAL ENCOUNTER (OUTPATIENT)
Age: 67
Setting detail: SPECIMEN
Discharge: HOME | End: 2022-11-14
Payer: MEDICARE

## 2022-11-14 LAB
INR BLD: 1
PROTHROMBIN TIME: 10.6 SEC (ref 9.1–12.3)

## 2022-11-14 PROCEDURE — 36415 COLL VENOUS BLD VENIPUNCTURE: CPT

## 2022-11-14 PROCEDURE — P9603 ONE-WAY ALLOW PRORATED MILES: HCPCS

## 2022-11-14 PROCEDURE — 85610 PROTHROMBIN TIME: CPT

## 2022-11-17 ENCOUNTER — HOSPITAL ENCOUNTER (OUTPATIENT)
Age: 67
Setting detail: SPECIMEN
Discharge: HOME | End: 2022-11-17

## 2022-11-17 LAB
INR BLD: 1
PROTHROMBIN TIME: 10.8 SEC (ref 9.1–12.3)

## 2022-11-17 PROCEDURE — 85610 PROTHROMBIN TIME: CPT

## 2022-11-17 PROCEDURE — P9603 ONE-WAY ALLOW PRORATED MILES: HCPCS

## 2022-11-17 PROCEDURE — 36415 COLL VENOUS BLD VENIPUNCTURE: CPT

## 2022-11-18 ENCOUNTER — APPOINTMENT (OUTPATIENT)
Dept: GENERAL RADIOLOGY | Age: 67
DRG: 871 | End: 2022-11-18
Payer: MEDICARE

## 2022-11-18 ENCOUNTER — HOSPITAL ENCOUNTER (INPATIENT)
Age: 67
LOS: 7 days | Discharge: NURSING, REHAB OR LONG-TERM CARE | DRG: 871 | End: 2022-11-25
Attending: EMERGENCY MEDICINE | Admitting: INTERNAL MEDICINE
Payer: MEDICARE

## 2022-11-18 DIAGNOSIS — J18.9 PNEUMONIA OF BOTH LOWER LOBES DUE TO INFECTIOUS ORGANISM: ICD-10-CM

## 2022-11-18 DIAGNOSIS — J96.01 ACUTE RESPIRATORY FAILURE WITH HYPOXIA AND HYPERCAPNIA (HCC): Primary | ICD-10-CM

## 2022-11-18 DIAGNOSIS — J96.02 ACUTE RESPIRATORY FAILURE WITH HYPOXIA AND HYPERCAPNIA (HCC): Primary | ICD-10-CM

## 2022-11-18 PROBLEM — J69.0 ASPIRATION PNEUMONIA, UNSPECIFIED ASPIRATION PNEUMONIA TYPE, UNSPECIFIED LATERALITY, UNSPECIFIED PART OF LUNG (HCC): Status: ACTIVE | Noted: 2022-11-18

## 2022-11-18 LAB
ABSOLUTE EOS #: 0 K/UL (ref 0–0.4)
ABSOLUTE EOS #: NORMAL K/UL (ref 0–0.4)
ABSOLUTE LYMPH #: 0.17 K/UL (ref 1–4.8)
ABSOLUTE LYMPH #: NORMAL K/UL (ref 1–4.8)
ABSOLUTE MONO #: 1.34 K/UL (ref 0.1–1.3)
ABSOLUTE MONO #: NORMAL K/UL (ref 0.1–1.3)
ALBUMIN SERPL-MCNC: 3.6 G/DL (ref 3.5–5.2)
ALBUMIN SERPL-MCNC: NORMAL G/DL (ref 3.5–5.2)
ALLEN TEST: ABNORMAL
ALLEN TEST: ABNORMAL
ALP BLD-CCNC: 132 U/L (ref 40–129)
ALP BLD-CCNC: NORMAL U/L (ref 40–129)
ALT SERPL-CCNC: 19 U/L (ref 5–41)
ALT SERPL-CCNC: NORMAL U/L (ref 5–41)
AMORPHOUS: ABNORMAL
ANION GAP SERPL CALCULATED.3IONS-SCNC: 10 MMOL/L (ref 9–17)
ANION GAP SERPL CALCULATED.3IONS-SCNC: NORMAL MMOL/L (ref 9–17)
AST SERPL-CCNC: 18 U/L
AST SERPL-CCNC: NORMAL U/L
BACTERIA: ABNORMAL
BASOPHILS # BLD: 0 % (ref 0–2)
BASOPHILS # BLD: NORMAL % (ref 0–2)
BASOPHILS ABSOLUTE: 0 K/UL (ref 0–0.2)
BASOPHILS ABSOLUTE: NORMAL K/UL (ref 0–0.2)
BILIRUB SERPL-MCNC: 0.3 MG/DL (ref 0.3–1.2)
BILIRUB SERPL-MCNC: NORMAL MG/DL (ref 0.3–1.2)
BILIRUBIN URINE: NEGATIVE
BUN BLDV-MCNC: 139 MG/DL (ref 8–23)
BUN BLDV-MCNC: NORMAL MG/DL (ref 8–23)
CALCIUM SERPL-MCNC: 9.6 MG/DL (ref 8.6–10.4)
CALCIUM SERPL-MCNC: NORMAL MG/DL (ref 8.6–10.4)
CARBOXYHEMOGLOBIN: 1.5 % (ref 0–5)
CARBOXYHEMOGLOBIN: 1.9 % (ref 0–5)
CHLORIDE BLD-SCNC: 86 MMOL/L (ref 98–107)
CHLORIDE BLD-SCNC: NORMAL MMOL/L (ref 98–107)
CO2: 36 MMOL/L (ref 20–31)
CO2: NORMAL MMOL/L (ref 20–31)
COLOR: YELLOW
CREAT SERPL-MCNC: 2.54 MG/DL (ref 0.7–1.2)
CREAT SERPL-MCNC: NORMAL MG/DL (ref 0.7–1.2)
CREATININE URINE: 40.6 MG/DL (ref 39–259)
EOSINOPHILS RELATIVE PERCENT: 0 % (ref 0–4)
EOSINOPHILS RELATIVE PERCENT: NORMAL %
EPITHELIAL CELLS UA: ABNORMAL /HPF
FIO2: 40
GFR SERPL CREATININE-BSD FRML MDRD: 27 ML/MIN/1.73M2
GFR SERPL CREATININE-BSD FRML MDRD: NORMAL ML/MIN/1.73M2
GLUCOSE BLD-MCNC: 186 MG/DL (ref 70–99)
GLUCOSE BLD-MCNC: NORMAL MG/DL (ref 70–99)
GLUCOSE URINE: NEGATIVE
HCO3 ARTERIAL: 33 MMOL/L (ref 22–26)
HCO3 ARTERIAL: 38.4 MMOL/L (ref 22–26)
HCT VFR BLD CALC: 32.6 % (ref 41–53)
HCT VFR BLD CALC: 35.3 % (ref 41–53)
HCT VFR BLD CALC: NORMAL % (ref 41–53)
HEMOGLOBIN: 10.3 G/DL (ref 13.5–17.5)
HEMOGLOBIN: 11.3 G/DL (ref 13.5–17.5)
HEMOGLOBIN: NORMAL G/DL (ref 13.5–17.5)
INR BLD: 1.3
INR BLD: 1.5
KETONES, URINE: NEGATIVE
LACTIC ACID, SEPSIS: NORMAL MMOL/L (ref 0.5–1.9)
LACTIC ACID: 0.6 MMOL/L (ref 0.5–2.2)
LEUKOCYTE ESTERASE, URINE: NEGATIVE
LIPASE: 51 U/L (ref 13–60)
LIPASE: NORMAL U/L (ref 13–60)
LYMPHOCYTES # BLD: 1 % (ref 24–44)
LYMPHOCYTES # BLD: NORMAL % (ref 24–44)
MAGNESIUM: 4.1 MG/DL (ref 1.6–2.6)
MAGNESIUM: NORMAL MG/DL (ref 1.6–2.6)
MCH RBC QN AUTO: 29.9 PG (ref 26–34)
MCH RBC QN AUTO: 30.1 PG (ref 26–34)
MCH RBC QN AUTO: NORMAL PG (ref 26–34)
MCHC RBC AUTO-ENTMCNC: 31.7 G/DL (ref 31–37)
MCHC RBC AUTO-ENTMCNC: 32.1 G/DL (ref 31–37)
MCHC RBC AUTO-ENTMCNC: NORMAL G/DL (ref 31–37)
MCV RBC AUTO: 93.7 FL (ref 80–100)
MCV RBC AUTO: 94.3 FL (ref 80–100)
MCV RBC AUTO: NORMAL FL (ref 80–100)
METHEMOGLOBIN: 0.4 % (ref 0–1.9)
METHEMOGLOBIN: 0.8 % (ref 0–1.9)
MODE: ABNORMAL
MONOCYTES # BLD: 8 % (ref 1–7)
MONOCYTES # BLD: NORMAL % (ref 1–7)
MORPHOLOGY: ABNORMAL
NITRITE, URINE: NEGATIVE
O2 DEVICE/FLOW/%: ABNORMAL
O2 DEVICE/FLOW/%: ABNORMAL
O2 SAT, ARTERIAL: 89.5 % (ref 95–98)
O2 SAT, ARTERIAL: 93.5 % (ref 95–98)
PARTIAL THROMBOPLASTIN TIME: 29.7 SEC (ref 24–36)
PARTIAL THROMBOPLASTIN TIME: 33.8 SEC (ref 24–36)
PATIENT TEMP: 37
PATIENT TEMP: 37
PCO2 ARTERIAL: 58.7 MMHG (ref 35–45)
PCO2 ARTERIAL: 61.4 MMHG (ref 35–45)
PDW BLD-RTO: 16.7 % (ref 11.5–14.9)
PDW BLD-RTO: 16.7 % (ref 11.5–14.9)
PDW BLD-RTO: NORMAL % (ref 11.5–14.9)
PEEP/CPAP: 8
PH ARTERIAL: 7.36 (ref 7.35–7.45)
PH ARTERIAL: 7.41 (ref 7.35–7.45)
PH UA: 5 (ref 5–8)
PLATELET # BLD: 237 K/UL (ref 150–450)
PLATELET # BLD: 255 K/UL (ref 150–450)
PLATELET # BLD: NORMAL K/UL (ref 150–450)
PMV BLD AUTO: 10.1 FL (ref 6–12)
PMV BLD AUTO: 9.8 FL (ref 6–12)
PMV BLD AUTO: NORMAL FL (ref 6–12)
PO2 ARTERIAL: 64.4 MMHG (ref 80–100)
PO2 ARTERIAL: 81.2 MMHG (ref 80–100)
POSITIVE BASE EXCESS, ART: 13.7 MMOL/L (ref 0–2)
POSITIVE BASE EXCESS, ART: 7.5 MMOL/L (ref 0–2)
POTASSIUM SERPL-SCNC: 4.9 MMOL/L (ref 3.7–5.3)
POTASSIUM SERPL-SCNC: NORMAL MMOL/L (ref 3.7–5.3)
PROCALCITONIN: 0.27 NG/ML
PROCALCITONIN: NORMAL NG/ML
PROTEIN UA: ABNORMAL
PROTHROMBIN TIME: 16.3 SEC (ref 11.8–14.6)
PROTHROMBIN TIME: 18.2 SEC (ref 11.8–14.6)
PT. POSITION: ABNORMAL
PT. POSITION: ABNORMAL
RBC # BLD: 3.45 M/UL (ref 4.5–5.9)
RBC # BLD: 3.77 M/UL (ref 4.5–5.9)
RBC # BLD: NORMAL M/UL (ref 4.5–5.9)
RBC UA: ABNORMAL /HPF
REASON FOR REJECTION: NORMAL
RESPIRATORY RATE: 16
RESPIRATORY RATE: 22
SAMPLE SITE: ABNORMAL
SAMPLE SITE: ABNORMAL
SEG NEUTROPHILS: 91 % (ref 36–66)
SEG NEUTROPHILS: NORMAL % (ref 36–66)
SEGMENTED NEUTROPHILS ABSOLUTE COUNT: 15.19 K/UL (ref 1.3–9.1)
SEGMENTED NEUTROPHILS ABSOLUTE COUNT: NORMAL K/UL (ref 1.3–9.1)
SET RATE: 16
SODIUM BLD-SCNC: 132 MMOL/L (ref 135–144)
SODIUM BLD-SCNC: NORMAL MMOL/L (ref 135–144)
SODIUM,UR: 29 MMOL/L
SPECIFIC GRAVITY UA: 1.01 (ref 1–1.03)
TEXT FOR RESPIRATORY: ABNORMAL
TOTAL PROTEIN: 7.6 G/DL (ref 6.4–8.3)
TOTAL PROTEIN: NORMAL G/DL (ref 6.4–8.3)
TOTAL RATE: 18
TROPONIN, HIGH SENSITIVITY: 160 NG/L (ref 0–22)
TROPONIN, HIGH SENSITIVITY: NORMAL NG/L (ref 0–22)
TURBIDITY: CLEAR
URINE HGB: NEGATIVE
UROBILINOGEN, URINE: NORMAL
VT: 500
WBC # BLD: 16.7 K/UL (ref 3.5–11)
WBC # BLD: 27.8 K/UL (ref 3.5–11)
WBC # BLD: NORMAL K/UL (ref 3.5–11)
WBC UA: ABNORMAL /HPF
ZZ NTE CLEAN UP: ORDERED TEST: NORMAL
ZZ NTE WITH NAME CLEAN UP: SPECIMEN SOURCE: NORMAL

## 2022-11-18 PROCEDURE — 2580000003 HC RX 258

## 2022-11-18 PROCEDURE — 71045 X-RAY EXAM CHEST 1 VIEW: CPT

## 2022-11-18 PROCEDURE — 83735 ASSAY OF MAGNESIUM: CPT

## 2022-11-18 PROCEDURE — 6370000000 HC RX 637 (ALT 250 FOR IP)

## 2022-11-18 PROCEDURE — 85730 THROMBOPLASTIN TIME PARTIAL: CPT

## 2022-11-18 PROCEDURE — 36415 COLL VENOUS BLD VENIPUNCTURE: CPT

## 2022-11-18 PROCEDURE — 84484 ASSAY OF TROPONIN QUANT: CPT

## 2022-11-18 PROCEDURE — 87070 CULTURE OTHR SPECIMN AEROBIC: CPT

## 2022-11-18 PROCEDURE — 94761 N-INVAS EAR/PLS OXIMETRY MLT: CPT

## 2022-11-18 PROCEDURE — 86920 COMPATIBILITY TEST SPIN: CPT

## 2022-11-18 PROCEDURE — 2500000003 HC RX 250 WO HCPCS: Performed by: EMERGENCY MEDICINE

## 2022-11-18 PROCEDURE — 0BH17EZ INSERTION OF ENDOTRACHEAL AIRWAY INTO TRACHEA, VIA NATURAL OR ARTIFICIAL OPENING: ICD-10-PCS | Performed by: INTERNAL MEDICINE

## 2022-11-18 PROCEDURE — 99285 EMERGENCY DEPT VISIT HI MDM: CPT

## 2022-11-18 PROCEDURE — 6360000002 HC RX W HCPCS: Performed by: INTERNAL MEDICINE

## 2022-11-18 PROCEDURE — 87040 BLOOD CULTURE FOR BACTERIA: CPT

## 2022-11-18 PROCEDURE — P9047 ALBUMIN (HUMAN), 25%, 50ML: HCPCS | Performed by: INTERNAL MEDICINE

## 2022-11-18 PROCEDURE — 89220 SPUTUM SPECIMEN COLLECTION: CPT

## 2022-11-18 PROCEDURE — 6370000000 HC RX 637 (ALT 250 FOR IP): Performed by: INTERNAL MEDICINE

## 2022-11-18 PROCEDURE — 83605 ASSAY OF LACTIC ACID: CPT

## 2022-11-18 PROCEDURE — 2500000003 HC RX 250 WO HCPCS: Performed by: INTERNAL MEDICINE

## 2022-11-18 PROCEDURE — 36556 INSERT NON-TUNNEL CV CATH: CPT

## 2022-11-18 PROCEDURE — 2580000003 HC RX 258: Performed by: INTERNAL MEDICINE

## 2022-11-18 PROCEDURE — 36600 WITHDRAWAL OF ARTERIAL BLOOD: CPT

## 2022-11-18 PROCEDURE — A4216 STERILE WATER/SALINE, 10 ML: HCPCS | Performed by: INTERNAL MEDICINE

## 2022-11-18 PROCEDURE — 85610 PROTHROMBIN TIME: CPT

## 2022-11-18 PROCEDURE — 84145 PROCALCITONIN (PCT): CPT

## 2022-11-18 PROCEDURE — 96375 TX/PRO/DX INJ NEW DRUG ADDON: CPT

## 2022-11-18 PROCEDURE — 80053 COMPREHEN METABOLIC PANEL: CPT

## 2022-11-18 PROCEDURE — 96367 TX/PROPH/DG ADDL SEQ IV INF: CPT

## 2022-11-18 PROCEDURE — 83036 HEMOGLOBIN GLYCOSYLATED A1C: CPT

## 2022-11-18 PROCEDURE — 31500 INSERT EMERGENCY AIRWAY: CPT

## 2022-11-18 PROCEDURE — 6360000002 HC RX W HCPCS

## 2022-11-18 PROCEDURE — 02HV33Z INSERTION OF INFUSION DEVICE INTO SUPERIOR VENA CAVA, PERCUTANEOUS APPROACH: ICD-10-PCS | Performed by: INTERNAL MEDICINE

## 2022-11-18 PROCEDURE — 86901 BLOOD TYPING SEROLOGIC RH(D): CPT

## 2022-11-18 PROCEDURE — 82947 ASSAY GLUCOSE BLOOD QUANT: CPT

## 2022-11-18 PROCEDURE — 82805 BLOOD GASES W/O2 SATURATION: CPT

## 2022-11-18 PROCEDURE — 81001 URINALYSIS AUTO W/SCOPE: CPT

## 2022-11-18 PROCEDURE — 99291 CRITICAL CARE FIRST HOUR: CPT | Performed by: INTERNAL MEDICINE

## 2022-11-18 PROCEDURE — 83690 ASSAY OF LIPASE: CPT

## 2022-11-18 PROCEDURE — 2580000003 HC RX 258: Performed by: EMERGENCY MEDICINE

## 2022-11-18 PROCEDURE — 96365 THER/PROPH/DIAG IV INF INIT: CPT

## 2022-11-18 PROCEDURE — 5A1945Z RESPIRATORY VENTILATION, 24-96 CONSECUTIVE HOURS: ICD-10-PCS | Performed by: INTERNAL MEDICINE

## 2022-11-18 PROCEDURE — 2000000000 HC ICU R&B

## 2022-11-18 PROCEDURE — C9113 INJ PANTOPRAZOLE SODIUM, VIA: HCPCS | Performed by: INTERNAL MEDICINE

## 2022-11-18 PROCEDURE — 6360000002 HC RX W HCPCS: Performed by: EMERGENCY MEDICINE

## 2022-11-18 PROCEDURE — 82533 TOTAL CORTISOL: CPT

## 2022-11-18 PROCEDURE — 93005 ELECTROCARDIOGRAM TRACING: CPT | Performed by: EMERGENCY MEDICINE

## 2022-11-18 PROCEDURE — 82570 ASSAY OF URINE CREATININE: CPT

## 2022-11-18 PROCEDURE — 84300 ASSAY OF URINE SODIUM: CPT

## 2022-11-18 PROCEDURE — 86900 BLOOD TYPING SEROLOGIC ABO: CPT

## 2022-11-18 PROCEDURE — 94002 VENT MGMT INPAT INIT DAY: CPT

## 2022-11-18 PROCEDURE — 87205 SMEAR GRAM STAIN: CPT

## 2022-11-18 PROCEDURE — 86850 RBC ANTIBODY SCREEN: CPT

## 2022-11-18 PROCEDURE — 85027 COMPLETE CBC AUTOMATED: CPT

## 2022-11-18 PROCEDURE — 85025 COMPLETE CBC W/AUTO DIFF WBC: CPT

## 2022-11-18 RX ORDER — SODIUM CHLORIDE 9 MG/ML
INJECTION, SOLUTION INTRAVENOUS PRN
Status: DISCONTINUED | OUTPATIENT
Start: 2022-11-18 | End: 2022-11-21

## 2022-11-18 RX ORDER — MAGNESIUM OXIDE 400 MG/1
400 TABLET ORAL 2 TIMES DAILY
COMMUNITY

## 2022-11-18 RX ORDER — GABAPENTIN 100 MG/1
200 CAPSULE ORAL NIGHTLY
Status: DISCONTINUED | OUTPATIENT
Start: 2022-11-18 | End: 2022-11-25 | Stop reason: HOSPADM

## 2022-11-18 RX ORDER — FUROSEMIDE 40 MG/1
40 TABLET ORAL DAILY
Status: DISCONTINUED | OUTPATIENT
Start: 2022-11-18 | End: 2022-11-25

## 2022-11-18 RX ORDER — HEPARIN SODIUM 1000 [USP'U]/ML
2000 INJECTION, SOLUTION INTRAVENOUS; SUBCUTANEOUS PRN
Status: DISCONTINUED | OUTPATIENT
Start: 2022-11-18 | End: 2022-11-25

## 2022-11-18 RX ORDER — CLINDAMYCIN PHOSPHATE 600 MG/50ML
600 INJECTION INTRAVENOUS EVERY 8 HOURS
Status: COMPLETED | OUTPATIENT
Start: 2022-11-18 | End: 2022-11-25

## 2022-11-18 RX ORDER — INSULIN LISPRO 100 [IU]/ML
INJECTION, SOLUTION INTRAVENOUS; SUBCUTANEOUS
COMMUNITY

## 2022-11-18 RX ORDER — HEPARIN SODIUM 1000 [USP'U]/ML
4000 INJECTION, SOLUTION INTRAVENOUS; SUBCUTANEOUS ONCE
Status: DISCONTINUED | OUTPATIENT
Start: 2022-11-18 | End: 2022-11-25

## 2022-11-18 RX ORDER — GABAPENTIN 100 MG/1
200 CAPSULE ORAL NIGHTLY
Status: CANCELLED | OUTPATIENT
Start: 2022-11-18

## 2022-11-18 RX ORDER — CARVEDILOL 6.25 MG/1
6.25 TABLET ORAL 2 TIMES DAILY
Status: DISCONTINUED | OUTPATIENT
Start: 2022-11-18 | End: 2022-11-25 | Stop reason: HOSPADM

## 2022-11-18 RX ORDER — FENTANYL CITRATE 0.05 MG/ML
50 INJECTION, SOLUTION INTRAMUSCULAR; INTRAVENOUS EVERY 30 MIN PRN
Status: DISCONTINUED | OUTPATIENT
Start: 2022-11-18 | End: 2022-11-20

## 2022-11-18 RX ORDER — CHLORHEXIDINE GLUCONATE 0.12 MG/ML
15 RINSE ORAL 2 TIMES DAILY
Status: DISCONTINUED | OUTPATIENT
Start: 2022-11-18 | End: 2022-11-20

## 2022-11-18 RX ORDER — SODIUM CHLORIDE 9 MG/ML
INJECTION, SOLUTION INTRAVENOUS PRN
Status: DISCONTINUED | OUTPATIENT
Start: 2022-11-18 | End: 2022-11-18

## 2022-11-18 RX ORDER — POLYETHYLENE GLYCOL 3350 17 G/17G
17 POWDER, FOR SOLUTION ORAL DAILY PRN
Status: DISCONTINUED | OUTPATIENT
Start: 2022-11-18 | End: 2022-11-25 | Stop reason: HOSPADM

## 2022-11-18 RX ORDER — INSULIN LISPRO 100 [IU]/ML
0-4 INJECTION, SOLUTION INTRAVENOUS; SUBCUTANEOUS
Status: DISCONTINUED | OUTPATIENT
Start: 2022-11-18 | End: 2022-11-19

## 2022-11-18 RX ORDER — METOCLOPRAMIDE HYDROCHLORIDE 5 MG/ML
10 INJECTION INTRAMUSCULAR; INTRAVENOUS ONCE
Status: COMPLETED | OUTPATIENT
Start: 2022-11-18 | End: 2022-11-18

## 2022-11-18 RX ORDER — GABAPENTIN 100 MG/1
200 CAPSULE ORAL NIGHTLY
COMMUNITY
Start: 2022-11-11 | End: 2022-11-18

## 2022-11-18 RX ORDER — NOREPINEPHRINE BIT/0.9 % NACL 16MG/250ML
INFUSION BOTTLE (ML) INTRAVENOUS
Status: DISCONTINUED
Start: 2022-11-18 | End: 2022-11-18

## 2022-11-18 RX ORDER — 0.9 % SODIUM CHLORIDE 0.9 %
1000 INTRAVENOUS SOLUTION INTRAVENOUS ONCE
Status: COMPLETED | OUTPATIENT
Start: 2022-11-18 | End: 2022-11-18

## 2022-11-18 RX ORDER — DEXTROSE MONOHYDRATE 100 MG/ML
INJECTION, SOLUTION INTRAVENOUS CONTINUOUS PRN
Status: DISCONTINUED | OUTPATIENT
Start: 2022-11-18 | End: 2022-11-25 | Stop reason: HOSPADM

## 2022-11-18 RX ORDER — HYDROCODONE BITARTRATE AND ACETAMINOPHEN 5; 325 MG/1; MG/1
1 TABLET ORAL EVERY 6 HOURS PRN
Status: ON HOLD | COMMUNITY
End: 2022-11-25 | Stop reason: HOSPADM

## 2022-11-18 RX ORDER — ACETAMINOPHEN 325 MG/1
650 TABLET ORAL EVERY 6 HOURS PRN
Status: DISCONTINUED | OUTPATIENT
Start: 2022-11-18 | End: 2022-11-25 | Stop reason: HOSPADM

## 2022-11-18 RX ORDER — FUROSEMIDE 40 MG/1
40 TABLET ORAL DAILY
COMMUNITY

## 2022-11-18 RX ORDER — CARVEDILOL 6.25 MG/1
6.25 TABLET ORAL 2 TIMES DAILY
COMMUNITY

## 2022-11-18 RX ORDER — POLYVINYL ALCOHOL 14 MG/ML
1 SOLUTION/ DROPS OPHTHALMIC EVERY 4 HOURS
Status: DISCONTINUED | OUTPATIENT
Start: 2022-11-18 | End: 2022-11-20

## 2022-11-18 RX ORDER — ONDANSETRON 2 MG/ML
4 INJECTION INTRAMUSCULAR; INTRAVENOUS EVERY 6 HOURS PRN
Status: DISCONTINUED | OUTPATIENT
Start: 2022-11-18 | End: 2022-11-25 | Stop reason: HOSPADM

## 2022-11-18 RX ORDER — SODIUM CHLORIDE 0.9 % (FLUSH) 0.9 %
5-40 SYRINGE (ML) INJECTION PRN
Status: DISCONTINUED | OUTPATIENT
Start: 2022-11-18 | End: 2022-11-25 | Stop reason: HOSPADM

## 2022-11-18 RX ORDER — ALBUMIN (HUMAN) 12.5 G/50ML
25 SOLUTION INTRAVENOUS EVERY 8 HOURS
Status: COMPLETED | OUTPATIENT
Start: 2022-11-18 | End: 2022-11-21

## 2022-11-18 RX ORDER — ACETAMINOPHEN 650 MG/1
650 SUPPOSITORY RECTAL EVERY 6 HOURS PRN
Status: DISCONTINUED | OUTPATIENT
Start: 2022-11-18 | End: 2022-11-25 | Stop reason: HOSPADM

## 2022-11-18 RX ORDER — SODIUM CHLORIDE 9 MG/ML
INJECTION, SOLUTION INTRAVENOUS CONTINUOUS
Status: DISCONTINUED | OUTPATIENT
Start: 2022-11-18 | End: 2022-11-21

## 2022-11-18 RX ORDER — NOREPINEPHRINE BIT/0.9 % NACL 16MG/250ML
1-35 INFUSION BOTTLE (ML) INTRAVENOUS CONTINUOUS
Status: DISCONTINUED | OUTPATIENT
Start: 2022-11-18 | End: 2022-11-18

## 2022-11-18 RX ORDER — SPIRONOLACTONE 25 MG/1
25 TABLET ORAL DAILY
Status: DISCONTINUED | OUTPATIENT
Start: 2022-11-18 | End: 2022-11-25 | Stop reason: HOSPADM

## 2022-11-18 RX ORDER — CLINDAMYCIN PHOSPHATE 900 MG/50ML
900 INJECTION INTRAVENOUS ONCE
Status: COMPLETED | OUTPATIENT
Start: 2022-11-18 | End: 2022-11-18

## 2022-11-18 RX ORDER — HEPARIN SODIUM 1000 [USP'U]/ML
4000 INJECTION, SOLUTION INTRAVENOUS; SUBCUTANEOUS PRN
Status: DISCONTINUED | OUTPATIENT
Start: 2022-11-18 | End: 2022-11-25

## 2022-11-18 RX ORDER — SACUBITRIL AND VALSARTAN 24; 26 MG/1; MG/1
1 TABLET, FILM COATED ORAL 2 TIMES DAILY
Status: ON HOLD | COMMUNITY
End: 2022-11-25 | Stop reason: HOSPADM

## 2022-11-18 RX ORDER — WARFARIN SODIUM 10 MG/1
10 TABLET ORAL
Status: DISCONTINUED | OUTPATIENT
Start: 2022-11-18 | End: 2022-11-18

## 2022-11-18 RX ORDER — CLINDAMYCIN PHOSPHATE 300 MG/50ML
300 INJECTION INTRAVENOUS EVERY 8 HOURS
Status: DISCONTINUED | OUTPATIENT
Start: 2022-11-18 | End: 2022-11-18

## 2022-11-18 RX ORDER — HYDROCODONE BITARTRATE AND ACETAMINOPHEN 5; 325 MG/1; MG/1
1 TABLET ORAL EVERY 6 HOURS PRN
Status: CANCELLED | OUTPATIENT
Start: 2022-11-18

## 2022-11-18 RX ORDER — ONDANSETRON 2 MG/ML
4 INJECTION INTRAMUSCULAR; INTRAVENOUS ONCE
Status: COMPLETED | OUTPATIENT
Start: 2022-11-18 | End: 2022-11-18

## 2022-11-18 RX ORDER — DRONABINOL 5 MG/1
5 CAPSULE ORAL
Status: ON HOLD | COMMUNITY
Start: 2022-11-11 | End: 2022-11-25 | Stop reason: HOSPADM

## 2022-11-18 RX ORDER — 0.9 % SODIUM CHLORIDE 0.9 %
500 INTRAVENOUS SOLUTION INTRAVENOUS ONCE
Status: COMPLETED | OUTPATIENT
Start: 2022-11-18 | End: 2022-11-18

## 2022-11-18 RX ORDER — PROPOFOL 10 MG/ML
INJECTION, EMULSION INTRAVENOUS
Status: COMPLETED
Start: 2022-11-18 | End: 2022-11-18

## 2022-11-18 RX ORDER — PROPOFOL 10 MG/ML
10 INJECTION, EMULSION INTRAVENOUS CONTINUOUS
Status: DISCONTINUED | OUTPATIENT
Start: 2022-11-18 | End: 2022-11-20

## 2022-11-18 RX ORDER — HYDROCODONE BITARTRATE AND ACETAMINOPHEN 5; 325 MG/1; MG/1
1 TABLET ORAL EVERY 6 HOURS PRN
Status: DISCONTINUED | OUTPATIENT
Start: 2022-11-18 | End: 2022-11-25 | Stop reason: HOSPADM

## 2022-11-18 RX ORDER — POTASSIUM CHLORIDE 7.45 MG/ML
10 INJECTION INTRAVENOUS PRN
Status: DISCONTINUED | OUTPATIENT
Start: 2022-11-18 | End: 2022-11-25 | Stop reason: HOSPADM

## 2022-11-18 RX ORDER — WARFARIN SODIUM 7.5 MG/1
7.5 TABLET ORAL EVERY EVENING
COMMUNITY

## 2022-11-18 RX ORDER — MINERAL OIL AND WHITE PETROLATUM 150; 830 MG/G; MG/G
OINTMENT OPHTHALMIC EVERY 4 HOURS
Status: DISCONTINUED | OUTPATIENT
Start: 2022-11-18 | End: 2022-11-20

## 2022-11-18 RX ORDER — CARVEDILOL 12.5 MG/1
6.25 TABLET ORAL 2 TIMES DAILY
Status: CANCELLED | OUTPATIENT
Start: 2022-11-18

## 2022-11-18 RX ORDER — ONDANSETRON 4 MG/1
4 TABLET, ORALLY DISINTEGRATING ORAL EVERY 8 HOURS PRN
Status: DISCONTINUED | OUTPATIENT
Start: 2022-11-18 | End: 2022-11-25 | Stop reason: HOSPADM

## 2022-11-18 RX ORDER — INSULIN LISPRO 100 [IU]/ML
0-4 INJECTION, SOLUTION INTRAVENOUS; SUBCUTANEOUS NIGHTLY
Status: DISCONTINUED | OUTPATIENT
Start: 2022-11-18 | End: 2022-11-19

## 2022-11-18 RX ORDER — SODIUM CHLORIDE 0.9 % (FLUSH) 0.9 %
5-40 SYRINGE (ML) INJECTION EVERY 12 HOURS SCHEDULED
Status: DISCONTINUED | OUTPATIENT
Start: 2022-11-18 | End: 2022-11-25 | Stop reason: HOSPADM

## 2022-11-18 RX ORDER — BUMETANIDE 1 MG/1
1 TABLET ORAL DAILY
Status: DISCONTINUED | OUTPATIENT
Start: 2022-11-18 | End: 2022-11-25 | Stop reason: HOSPADM

## 2022-11-18 RX ORDER — HEPARIN SODIUM 10000 [USP'U]/100ML
5-30 INJECTION, SOLUTION INTRAVENOUS CONTINUOUS
Status: DISCONTINUED | OUTPATIENT
Start: 2022-11-18 | End: 2022-11-25

## 2022-11-18 RX ORDER — MAGNESIUM SULFATE HEPTAHYDRATE 40 MG/ML
2000 INJECTION, SOLUTION INTRAVENOUS ONCE
Status: DISCONTINUED | OUTPATIENT
Start: 2022-11-18 | End: 2022-11-18

## 2022-11-18 RX ADMIN — INSULIN LISPRO 4 UNITS: 100 INJECTION, SOLUTION INTRAVENOUS; SUBCUTANEOUS at 23:36

## 2022-11-18 RX ADMIN — PROPOFOL 10 MCG/KG/MIN: 10 INJECTION, EMULSION INTRAVENOUS at 18:10

## 2022-11-18 RX ADMIN — POLYVINYL ALCOHOL 1 DROP: 14 SOLUTION/ DROPS OPHTHALMIC at 22:19

## 2022-11-18 RX ADMIN — CEFEPIME 2000 MG: 2 INJECTION, POWDER, FOR SOLUTION INTRAVENOUS at 12:08

## 2022-11-18 RX ADMIN — SODIUM CHLORIDE: 9 INJECTION, SOLUTION INTRAVENOUS at 19:51

## 2022-11-18 RX ADMIN — METOCLOPRAMIDE 10 MG: 5 INJECTION, SOLUTION INTRAMUSCULAR; INTRAVENOUS at 13:24

## 2022-11-18 RX ADMIN — CLINDAMYCIN IN 5 PERCENT DEXTROSE 900 MG: 18 INJECTION, SOLUTION INTRAVENOUS at 12:41

## 2022-11-18 RX ADMIN — SODIUM CHLORIDE 1000 ML: 9 INJECTION, SOLUTION INTRAVENOUS at 11:44

## 2022-11-18 RX ADMIN — CLINDAMYCIN IN 5 PERCENT DEXTROSE 600 MG: 12 INJECTION, SOLUTION INTRAVENOUS at 22:18

## 2022-11-18 RX ADMIN — DEXTROSE MONOHYDRATE 20 MCG/MIN: 50 INJECTION, SOLUTION INTRAVENOUS at 18:10

## 2022-11-18 RX ADMIN — SODIUM CHLORIDE 500 ML: 9 INJECTION, SOLUTION INTRAVENOUS at 16:19

## 2022-11-18 RX ADMIN — CHLORHEXIDINE GLUCONATE 0.12% ORAL RINSE 15 ML: 1.2 LIQUID ORAL at 21:00

## 2022-11-18 RX ADMIN — SODIUM CHLORIDE, PRESERVATIVE FREE 40 MG: 5 INJECTION INTRAVENOUS at 23:44

## 2022-11-18 RX ADMIN — VASOPRESSIN 0.04 UNITS/MIN: 20 INJECTION INTRAVENOUS at 18:20

## 2022-11-18 RX ADMIN — ONDANSETRON 4 MG: 2 INJECTION INTRAMUSCULAR; INTRAVENOUS at 11:47

## 2022-11-18 RX ADMIN — SODIUM CHLORIDE, PRESERVATIVE FREE 10 ML: 5 INJECTION INTRAVENOUS at 21:00

## 2022-11-18 RX ADMIN — MAGNESIUM SULFATE HEPTAHYDRATE 2000 MG: 40 INJECTION, SOLUTION INTRAVENOUS at 12:26

## 2022-11-18 RX ADMIN — CEFEPIME 1000 MG: 1 INJECTION, POWDER, FOR SOLUTION INTRAMUSCULAR; INTRAVENOUS at 23:43

## 2022-11-18 RX ADMIN — ALBUMIN (HUMAN) 25 G: 0.25 INJECTION, SOLUTION INTRAVENOUS at 16:00

## 2022-11-18 ASSESSMENT — PULMONARY FUNCTION TESTS
PIF_VALUE: 22
PIF_VALUE: 21
PIF_VALUE: 22
PIF_VALUE: 26
PIF_VALUE: 22
PIF_VALUE: 27
PIF_VALUE: 23
PIF_VALUE: 23
PIF_VALUE: 21
PIF_VALUE: 21
PIF_VALUE: 18
PIF_VALUE: 21
PIF_VALUE: 22
PIF_VALUE: 21
PIF_VALUE: 22
PIF_VALUE: 22
PIF_VALUE: 21
PIF_VALUE: 22
PIF_VALUE: 23
PIF_VALUE: 22
PIF_VALUE: 23
PIF_VALUE: 22

## 2022-11-18 ASSESSMENT — ENCOUNTER SYMPTOMS
EYE PAIN: 0
COUGH: 1
COLOR CHANGE: 0
SHORTNESS OF BREATH: 1
ABDOMINAL PAIN: 0
BACK PAIN: 0

## 2022-11-18 ASSESSMENT — PAIN SCALES - WONG BAKER: WONGBAKER_NUMERICALRESPONSE: 0

## 2022-11-18 ASSESSMENT — PAIN SCALES - GENERAL: PAINLEVEL_OUTOF10: 2

## 2022-11-18 NOTE — PROGRESS NOTES
Dr German Quinteros at bedside to place central line. Levophed running through right periphial IV. Good blood return noted.

## 2022-11-18 NOTE — ED NOTES
ICU called for nurse to come down to ED. She will be down in 10 minutes.      Kimberly Julien RN  11/18/22 [id number]

## 2022-11-18 NOTE — CONSENT
Informed Consent for Blood Component Transfusion Note    I have discussed with the patient the rationale for blood component transfusion; its benefits in treating or preventing fatigue, organ damage, or death; and its risk which includes mild transfusion reactions, rare risk of blood borne infection, or more serious but rare reactions. I have discussed the alternatives to transfusion, including the risk and consequences of not receiving transfusion. The patient had an opportunity to ask questions and had agreed to proceed with transfusion of blood components.     Electronically signed by Luiza Escobar DO on 11/18/22 at 12:31 PM EST

## 2022-11-18 NOTE — CARE COORDINATION
CASE MANAGEMENT NOTE:    Admission Date:  11/18/2022 Jen Monroe is a 79 y.o.  male    Admitted for : Acute respiratory failure with hypoxia and hypercapnia (HCC) [J96.01, J96.02]  Pneumonia of both lower lobes due to infectious organism [J18.9]  Aspiration pneumonia, unspecified aspiration pneumonia type, unspecified laterality, unspecified part of lung ([address] [J69.0]    Met with:  Patient's Brother, Claudette Grice, via phone, [phone]    PCP:                                  Insurance:  Medicare      Is patient alert and oriented at time of discussion:  Spoke to Pt's Brother, Pt. Currently on [facility]    Current Residence/ Living Arrangements:  in nursing home             Does patient go to outpatient dialysis: No  If yes, location and chair time: NA  Who is their nephrologist? NA, Per Brother, he did have Dialysis when at [facility] needed: Yes, Return to [address], has been there only a few days. Left , for [place]. Freedom of choice and list provided: Return to [address]    Is patient currently receiving oral anticoagulation therapy? Yes, On Coumadin,IR 1.3    Is the Patient an [facility] with Readmission Risk Score greater than 14%? No  If yes, pt needs a follow up appointment made within 7 days. Family Members/Caregivers that pt would like involved in their care:    Yes    If yes, list name here:  [facility]    Transportation Provider:  Ambulance             Discharge Plan:  11/18/22 Medicare Pt. On [facility], Is Coming from [facility]. Per Brother, has been there only a few days, Went from [facility] to [facility]. MYLES BKA. Peg Tube, PT/OT, IV Clinda/Cefepime & IV Albumin. WBC 16.7. On Coumadin, INR  1.3. CR. 2.54, Pt did have Dialysis when he was at [place], [address].  Mitzi will need signed/completed//KB                 Electronically signed by: Leo Phelps RN on 11/18/2022 at 4:17 PM

## 2022-11-18 NOTE — PROGRESS NOTES
Patient admitted to intermediate 2013 from ER. Transferred to bed per staff. RN at bedside to assess.

## 2022-11-18 NOTE — Clinical Note
Discharge Plan[de-identified] Other/[facility]riss[facility])   Telemetry/Cardiac Monitoring Required?: Yes   Bed request comments: intermediate

## 2022-11-18 NOTE — ED TRIAGE NOTES
Mode of arrival (squad #, walk in, police, etc) : walk in        Chief complaint(s): SOB        Arrival Note (brief scenario, treatment PTA, etc). : Pt sent in with SOB after 3 episodes of emesis after having his G-tube flushed, after meds, with 150mL of fluids- per EMS. Per facility pt was approx 70% on RA after the emesis. C= \"Have you ever felt that you should Cut down on your drinking? \"  No  A= \"Have people Annoyed you by criticizing your drinking? \"  No  G= \"Have you ever felt bad or Guilty about your drinking? \"  No  E= \"Have you ever had a drink as an Eye-opener first thing in the morning to steady your nerves or to help a hangover? \"  No      Deferred []      Reason for deferring: N/A    *If yes to two or more: probable alcohol abuse. *

## 2022-11-18 NOTE — PROGRESS NOTES
Pulmonary/CCM follow-up note    Called back to see patient. He has significantly dropped his blood pressure over the last hour since I seen him in the ER. He is not responding to fluid boluses. We will transfer him to the intensive care unit. We will start pressors, especially given his cardiomyopathy. He also is becoming less responsive and I do not think he can protect his airway anymore. We will go ahead and intubate him. We will check stat cortisol and lactate level. I spoke to his brother Marcio Maradiaga who is also power of . He told me that the patient said if there was a chance to Eligio Fryfts it a try\" in terms of resuscitation but if it looks like things will be prolonged or prognosis is futile, then he would not want to be on ventilator and other resuscitative measures. I think that is reasonable for now.     Critical care time 45 minutes

## 2022-11-18 NOTE — ED PROVIDER NOTES
EMERGENCY DEPARTMENT ENCOUNTER    Pt Name: Aden Person  MRN: [id number]  [place] 1955  Date of evaluation: 11/18/22  CHIEF COMPLAINT       Chief Complaint   Patient presents with    Shortness of Breath     HISTORY OF PRESENT ILLNESS   51-year-old male presents for complaints of difficulty breathing and concern for aspiration pneumonia. Per nursing home staff they had flushed patient's PEG tube he then proceeded to have episodes of vomiting and then also developed shortness of breath and difficulty breathing. Per nursing home staff his sats dropped into the 70s on room air he was then placed on a nonrebreather and EMS was called for transport. Per EMS patient also did have a recent left BKA. Patient unable to provide any further history at this time    The history is provided by the EMS personnel and the nursing home. The history is limited by the condition of the patient. REVIEW OF SYSTEMS     Review of Systems   Constitutional:  Negative for chills and fever. HENT:  Negative for congestion and ear pain. Eyes:  Negative for pain. Respiratory:  Positive for cough and shortness of breath. Cardiovascular:  Negative for chest pain, palpitations and leg swelling. Gastrointestinal:  Negative for abdominal pain. Genitourinary:  Negative for dysuria and flank pain. Musculoskeletal:  Negative for back pain. Skin:  Negative for color change. Neurological:  Negative for numbness and headaches. Psychiatric/Behavioral:  Negative for confusion. All other systems reviewed and are negative.   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     Depression Heart failure (HCC)     Hypertension     Muscle weakness (generalized)     Other symbolic dysfunctions     Peripheral vascular disease ([facility])     Type 2 diabetes mellitus ([facility]      Past Problem List  Patient Active Problem List   Diagnosis Code    Sepsis ([facility] A41.9    Septic shock ([place] [address] A41.9, R65.21    Acute respiratory failure with hypoxia and hypercapnia ([facility]) J96.01, J96.02    Aspiration pneumonia, unspecified aspiration pneumonia type, unspecified laterality, unspecified part of lung ([place] [address] J69.0     SURGICAL HISTORY       Past Surgical History:   Procedure Laterality Date    LEG AMPUTATION BELOW KNEE Bilateral      CURRENT MEDICATIONS       Previous Medications    BUMETANIDE (BUMEX) 1 MG TABLET    1 mg by Per G Tube route daily    CARVEDILOL (COREG) 6.25 MG TABLET    6.25 mg by Per G Tube route 2 times daily    DRONABINOL (MARINOL) 5 MG CAPSULE    Take 5 mg by mouth 2 times daily (before meals). FUROSEMIDE (LASIX) 40 MG TABLET    40 mg by Per G Tube route daily    GABAPENTIN (NEURONTIN) 100 MG CAPSULE    200 mg by Per G Tube route at bedtime. HYDROCODONE-ACETAMINOPHEN (NORCO) 5-325 MG PER TABLET    Take 1 tablet by mouth every 6 hours as needed for Pain.  Given per g-tube    INSULIN LISPRO (HUMALOG) 100 UNIT/ML SOLN INJECTION VIAL    Inject into the skin 3 times daily (with meals) 351-400: 10 units, 301-350: 8 units, 351-300: 6 units, 201-250: 4 units, 151-200: 2 units    MAGNESIUM OXIDE (MAG-OX) 400 MG TABLET    400 mg by Per G Tube route 2 times daily    POLYETHYLENE GLYCOL (GLYCOLAX) 17 GM/SCOOP POWDER    17 g by Per G Tube route daily    SACUBITRIL-VALSARTAN (ENTRESTO) 24-26 MG PER TABLET    1 tablet by Per G Tube route 2 times daily    SENNA-DOCUSATE (PERICOLACE) 8.6-50 MG PER TABLET    1 tablet by Per G Tube route every 12 hours as needed for Constipation    SERTRALINE (ZOLOFT) 50 MG TABLET    50 mg by Per G Tube route daily    SPIRONOLACTONE (ALDACTONE) 25 MG TABLET    25 mg by Per G Tube route daily    WARFARIN (COUMADIN) 7.5 MG TABLET    7.5 mg by Per G Tube route every evening     ALLERGIES     is allergic to penicillins. FAMILY HISTORY     has no family status information on file. SOCIAL HISTORY       Social History     Tobacco Use    Smoking status: Unknown     PHYSICAL EXAM     INITIAL VITALS: /85   Pulse (!) 106   Temp 98 °F (36.7 °C)   Resp 18   Ht 6' 3\" (1.905 m)   Wt 155 lb (70.3 kg)   SpO2 97%   BMI 19.37 kg/m²    Physical Exam  Vitals and nursing note reviewed. Constitutional:       General: He is not in acute distress. Appearance: Normal appearance. He is not ill-appearing. HENT:      Head: Normocephalic and atraumatic. Right Ear: External ear normal.      Left Ear: External ear normal.      Nose: Nose normal.      Mouth/Throat:      Mouth: Mucous membranes are moist.   Eyes:      Extraocular Movements: Extraocular movements intact. Pupils: Pupils are equal, round, and reactive to light. Cardiovascular:      Rate and Rhythm: Regular rhythm. Tachycardia present. Pulses: Normal pulses. Radial pulses are 2+ on the right side and 2+ on the left side. Heart sounds: Normal heart sounds. Pulmonary:      Effort: Pulmonary effort is normal.      Breath sounds: Decreased breath sounds and rhonchi present. Comments: Diminished lung sounds bilaterally, scattered rhonchi bilaterally  Abdominal:      General: Abdomen is flat. Palpations: Abdomen is soft. Tenderness: There is no abdominal tenderness. Musculoskeletal:         General: No tenderness. Normal range of motion. Cervical back: Neck supple. No spinous process tenderness or muscular tenderness. Right Lower Extremity: Right leg is amputated below knee. Left Lower Extremity: Left leg is amputated below knee. Skin:     General: Skin is warm and dry. Capillary Refill: Capillary refill takes less than 2 seconds.    Neurological:      General: No focal deficit present. Mental Status: He is alert and oriented to person, place, and time. Cranial Nerves: Cranial nerves 2-12 are intact. Sensory: Sensation is intact. Motor: Motor function is intact. Comments: A/Ox2   Psychiatric:         Behavior: Behavior normal.         Thought Content: Thought content does not include homicidal or suicidal ideation. MEDICAL DECISION MAKIN-year-old male presents for difficulty breathing and concern for aspiration. On initial exam patient noted to be satting at 95% on a nonrebreather, he is diminished with some scattered rhonchi bilaterally, no obvious respiratory distress at this time, he is alert and oriented x3, we will transition to high flow nasal cannula via salter and will check labs, chest x-ray, start on antibiotics plan for likely admission      Labs reviewed patient initially had labs resulted that was showing his magnesium was low at 1.4 and his hemoglobin was low at 5.3, magnesium replacement was ordered and a transfusion was ordered    Lab then called and was concerned that these results were not accurate and repeat labs were obtained magnesium was then noted to be 4.1 which is like related to replacement, hemoglobin was at 11.3 white blood cell count at 16.7, troponin at 168 which is not significantly changed from his baseline, chest x-ray was reviewed showing bibasilar pulmonary opacities suggesting pneumonia    Given the reported history of vomiting concerning for aspiration pneumonia patient was started on antibiotics    We will admit for further treatment and given the new oxygen requirement    Discussed with Dr. Gerson Carreon from pulmonology he will evaluate    Spoke with Dr. Nathan Tracey who accepts admission. Patient demonstrates understanding and agreement with the plan, was given the opportunity to ask questions, and these questions were answered to the best of the provided information at this time. VS stable for transfer. This dictation was prepared using [employer] voice recognition software. CRITICAL CARE: 33 min      PROCEDURES:    Procedures    DIAGNOSTIC RESULTS   EKG:All EKG's are interpreted by the Emergency Department Physician who either signs or Co-signs this chart in the absence of a cardiologist.    Sinus tach rate of 102, left axis, first-degree block, no ST segment elevation or depression, nonspecific T wave changes    RADIOLOGY:All plain film, CT, MRI, and formal ultrasound images (except ED bedside ultrasound) are read by the radiologist, see reports below, unless otherwisenoted in MDM or here. XR CHEST PORTABLE   Final Result   Bibasilar pulmonary opacities suggesting pneumonia           LABS: All lab results were reviewed by myself, and all abnormals are listed below.   Labs Reviewed   BLOOD GAS, ARTERIAL - Abnormal; Notable for the following components:       Result Value    pCO2, Arterial 61.4 (*)     HCO3, Arterial 38.4 (*)     Positive Base Excess, Art 13.7 (*)     O2 Sat, Arterial 93.5 (*)     All other components within normal limits   PROTIME-INR - Abnormal; Notable for the following components:    Protime 16.3 (*)     All other components within normal limits   COMPREHENSIVE METABOLIC PANEL - Abnormal; Notable for the following components:    Glucose 186 (*)      (*)     Creatinine 2.54 (*)     Est, Glom Filt Rate 27 (*)     Sodium 132 (*)     Chloride 86 (*)     CO2 36 (*)     Alkaline Phosphatase 132 (*)     All other components within normal limits   MAGNESIUM - Abnormal; Notable for the following components:    Magnesium 4.1 (*)     All other components within normal limits   PROCALCITONIN - Abnormal; Notable for the following components:    Procalcitonin 0.27 (*)     All other components within normal limits   TROPONIN - Abnormal; Notable for the following components:    Troponin, High Sensitivity 160 (*)     All other components within normal limits   CBC WITH AUTO DIFFERENTIAL - Abnormal; Notable for the following components:    WBC 16.7 (*)     RBC 3.77 (*)     Hemoglobin 11.3 (*)     Hematocrit 35.3 (*)     RDW 16.7 (*)     Seg Neutrophils 91 (*)     Lymphocytes 1 (*)     Monocytes 8 (*)     Segs Absolute 15.19 (*)     Absolute Lymph # 0.17 (*)     Absolute Mono # 1.34 (*)     All other components within normal limits   CULTURE, BLOOD 1   CULTURE, BLOOD 2   CBC WITH AUTO DIFFERENTIAL   COMPREHENSIVE METABOLIC PANEL   LIPASE   MAGNESIUM   TROPONIN   PROCALCITONIN   LACTATE, SEPSIS   SPECIMEN REJECTION   APTT   LIPASE   LACTIC ACID   URINALYSIS WITH REFLEX TO CULTURE   LACTATE, SEPSIS   TYPE AND SCREEN       EMERGENCY DEPARTMENTCOURSE:         Vitals:    Vitals:    11/18/22 1230 11/18/22 1256 11/18/22 1326 11/18/22 1356   BP: 122/82 108/69 124/86 112/85   Pulse: (!) 102 (!) 102 (!) 102 (!) 106   Resp: 25 (!) 32 26 18   Temp:       SpO2: 98% 97% 97% 97%   Weight:       Height:           The patient was given the following medications while in the emergency department:  Orders Placed This Encounter   Medications    ondansetron (ZOFRAN) injection 4 mg    0.9 % sodium chloride bolus    cefepime (MAXIPIME) 2,000 mg in sodium chloride 0.9 % 50 mL IVPB mini-bag     Order Specific Question:   Antimicrobial Indications     Answer:   Aspiration Pneumonia    clindamycin (CLEOCIN) 900 mg in dextrose 5 % 50 mL IVPB     Order Specific Question:   Antimicrobial Indications     Answer:   Aspiration Pneumonia    DISCONTD: 0.9 % sodium chloride infusion    DISCONTD: magnesium sulfate 2000 mg in water 50 mL IVPB    metoclopramide (REGLAN) injection 10 mg    sodium chloride flush 0.9 % injection 5-40 mL    sodium chloride flush 0.9 % injection 5-40 mL    0.9 % sodium chloride infusion    OR Linked Order Group     ondansetron (ZOFRAN-ODT) disintegrating tablet 4 mg     ondansetron (ZOFRAN) injection 4 mg    polyethylene glycol (GLYCOLAX) packet 17 g    OR Linked Order Group     acetaminophen (TYLENOL) tablet 650 mg     acetaminophen (TYLENOL) suppository 650 mg    potassium chloride 10 mEq/100 mL IVPB (Peripheral Line)     CONSULTS:  IP CONSULT TO INTERNAL MEDICINE  IP CONSULT TO PULMONOLOGY  IP CONSULT TO NEPHROLOGY  IP CONSULT TO SOCIAL WORK  IP CONSULT TO CRITICAL CARE  PHARMACY TO DOSE WARFARIN    FINAL IMPRESSION      1. Acute respiratory failure with hypoxia and hypercapnia (HCC)    2. Pneumonia of both lower lobes due to infectious organism          DISPOSITION/PLAN   DISPOSITION Admitted 11/18/2022 01:50:25 PM      PATIENT REFERRED TO:  No follow-up provider specified. DISCHARGE MEDICATIONS:  New Prescriptions    No medications on file     The care is provided during an unprecedented national emergency due to the novel coronavirus, COVID 19.   DO Shahid Mcdowell DO  11/18/22 [id number]

## 2022-11-18 NOTE — H&P
[address]      [address] [facility]     HISTORY AND PHYSICAL EXAMINATION            Date:   11/18/2022  Patient name:  Sergio Rodriguez  Date of admission:  11/18/2022 11:46 AM  MRN:   [id number]  Account:  [de-identified]  YOB: 1955  PCP:    Erika Robertson MD  Room:   JESSIE/JESSIE  Code Status:    Full Code    Chief Complaint:     Chief Complaint   Patient presents with    Shortness of Breath       History Obtained From:     The EMS personnel and nursing home . History of Present Illness: The patient is a poor historian. The patient is a 79 y.o. 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 The nursing staff had concerns for aspiration. His oxygen saturation dropped to the 70s. The patient was transferred to [facility] for evaluation. In the ED, the patient was tachycardic (106), tachypneic (RR 25), and hypoxic (02 saturation in 70s). The patient was started on a 100% non re-breather. The patient was then weaned down to a 10 L salter (02 saturation 98%). The salter was then decreased to 8L. Chest x-ray showed bibasilar pulmonary opacities suggesting pneumonia. The patient was started on Cefepime and Clindamycin. EKG showed sinus tachycardia with 1st degree AV block, left anterior fascicular block, and anterolateral infarct (age undetermined). WBCS were elevated at 16.7. Hgb was 11.3. ABGs showed pH 7.405, pC02 61.4, p02 81.2, Hc03 38.4. Blood cultures were drawn and results are pending. The patient was given normal saline bolus.  The patient was admitted for acute hypoxic, hypercapnic respiratory failure secondary to suspected bilateral aspiration pneumonia. 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 ([facility]     Acquired absence of right leg below knee ([facility] [address]     Acute kidney failure (HCC)     Acute respiratory failure with hypoxia (HCC)     Cerebral infarction due to embolism (HCC)     Chronic kidney disease, unspecified     Chronic systolic (congestive) heart failure (HCC)     Depression     Heart failure (HCC)     Hypertension     Muscle weakness (generalized)     Other symbolic dysfunctions     Peripheral vascular disease (HCC)     Type 2 diabetes mellitus ([facility] [address]         Past SurgicalHistory:     Past Surgical History:   Procedure Laterality Date    LEG AMPUTATION BELOW KNEE Bilateral         Medications Prior to Admission:        Prior to Admission medications    Medication Sig Start Date End Date Taking? Authorizing Provider   carvedilol (COREG) 6.25 MG tablet 6.25 mg by Per G Tube route 2 times daily   Yes Historical Provider, MD   dronabinol (MARINOL) 5 MG capsule Take 5 mg by mouth 2 times daily (before meals). 11/11/22 11/18/22 Yes Historical Provider, MD   sacubitril-valsartan (ENTRESTO) 24-26 MG per tablet 1 tablet by Per G Tube route 2 times daily   Yes Historical Provider, MD   furosemide (LASIX) 40 MG tablet 40 mg by Per G Tube route daily   Yes Historical Provider, MD   gabapentin (NEURONTIN) 100 MG capsule 200 mg by Per G Tube route at bedtime. 11/11/22 11/18/22 Yes Historical Provider, MD   HYDROcodone-acetaminophen (NORCO) 5-325 MG per tablet Take 1 tablet by mouth every 6 hours as needed for Pain.  Given per g-tube   Yes Historical Provider, MD   insulin lispro (HUMALOG) 100 UNIT/ML SOLN injection vial Inject into the skin 3 times daily (with meals) 351-400: 10 units, 301-350: 8 units, 351-300: 6 units, 201-250: 4 units, 151-200: 2 units   Yes Historical Provider, MD   magnesium oxide (MAG-OX) 400 MG tablet 400 mg by Per G Tube route 2 times daily   Yes Historical Provider, MD   sertraline (ZOLOFT) 50 MG tablet 50 mg by Per G Tube route daily   Yes Historical Provider, MD   warfarin (COUMADIN) 7.5 MG tablet 7.5 mg by Per G Tube route every evening   Yes Historical Provider, MD   bumetanide (BUMEX) 1 MG tablet 1 mg by Per G Tube route daily    Historical Provider, MD   senna-docusate (Robin Shutter) 8.6-50 MG per tablet 1 tablet by Per G Tube route every 12 hours as needed for Constipation    Historical Provider, MD   spironolactone (ALDACTONE) 25 MG tablet 25 mg by Per G Tube route daily    Historical Provider, MD   polyethylene glycol (GLYCOLAX) 17 GM/SCOOP powder 17 g by Per G Tube route daily    Historical Provider, MD        Allergies:     Penicillins    Social History:     Tobacco:    has no history on file for tobacco use. Alcohol:      has no history on file for alcohol use. Drug Use:  has no history on file for drug use. Family History:     History reviewed. No pertinent family history. Review of Systems:     Positive and Negative as described in HPI. Review of Systems    Unable to perform due to patient status and expressive aphasia. Physical Exam:   /85   Pulse (!) 106   Temp 98 °F (36.7 °C)   Resp 18   Ht 6' 3\" (1.905 m)   Wt 155 lb (70.3 kg)   SpO2 97%   BMI 19.37 kg/m²   Temp (24hrs), Av °F (36.7 °C), Min:98 °F (36.7 °C), Max:98 °F (36.7 °C)    No results for input(s): POCGLU in the last 72 hours. No intake or output data in the 24 hours ending 22 1437    Physical Exam  Constitutional:       General: He is not in acute distress. HENT:      Head: Normocephalic and atraumatic. Nose: No congestion or rhinorrhea. Eyes:      Extraocular Movements: Extraocular movements intact. Conjunctiva/sclera: Conjunctivae normal.      Pupils: Pupils are equal, round, and reactive to light.    Cardiovascular:      Rate and Rhythm: Normal rate and regular rhythm. Pulses: Normal pulses. Heart sounds: Normal heart sounds. No murmur heard. No friction rub. No gallop. Pulmonary:      Effort: Pulmonary effort is normal. No respiratory distress. Breath sounds: Rhonchi present. No wheezing or rales. Abdominal:      General: Abdomen is flat. Bowel sounds are normal. There is no distension. Tenderness: There is no abdominal tenderness. There is no guarding. Musculoskeletal:      Right lower leg: No edema. Left lower leg: No edema. Skin:     Capillary Refill: Capillary refill takes less than 2 seconds. Coloration: Skin is not jaundiced or pale. Neurological:      Sensory: No sensory deficit. Motor: No weakness. Psychiatric:         Mood and Affect: Mood normal.       Investigations:     Laboratory Testing:  Recent Results (from the past 24 hour(s))   CBC with Auto Differential    Collection Time: 11/18/22 11:40 AM   Result Value Ref Range    WBC PLEASE DISREGARD RESULTS. SPECIMEN CONTAMINATED. 3.5 - 11.0 k/uL    RBC PLEASE DISREGARD RESULTS. SPECIMEN CONTAMINATED. 4.5 - 5.9 m/uL    Hemoglobin PLEASE DISREGARD RESULTS. SPECIMEN CONTAMINATED. 13.5 - 17.5 g/dL    Hematocrit PLEASE DISREGARD RESULTS. SPECIMEN CONTAMINATED. 41 - 53 %    MCV PLEASE DISREGARD RESULTS. SPECIMEN CONTAMINATED. 80 - 100 fL    MCH PLEASE DISREGARD RESULTS. SPECIMEN CONTAMINATED. 26 - 34 pg    MCHC PLEASE DISREGARD RESULTS. SPECIMEN CONTAMINATED. 31 - 37 g/dL    RDW PLEASE DISREGARD RESULTS. SPECIMEN CONTAMINATED. 11.5 - 14.9 %    Platelets PLEASE DISREGARD RESULTS. SPECIMEN CONTAMINATED. 150 - 450 k/uL    MPV PLEASE DISREGARD RESULTS. SPECIMEN CONTAMINATED. 6.0 - 12.0 fL    Seg Neutrophils PLEASE DISREGARD RESULTS. SPECIMEN CONTAMINATED. 36 - 66 %    Lymphocytes PLEASE DISREGARD RESULTS. SPECIMEN CONTAMINATED. 24 - 44 %    Monocytes PLEASE DISREGARD RESULTS. SPECIMEN CONTAMINATED. 1 - 7 %    Eosinophils % PLEASE DISREGARD RESULTS.   SPECIMEN CONTAMINATED. %    Basophils PLEASE DISREGARD RESULTS. SPECIMEN CONTAMINATED. 0 - 2 %    Segs Absolute PLEASE DISREGARD RESULTS. SPECIMEN CONTAMINATED. 1.3 - 9.1 k/uL    Absolute Lymph # PLEASE DISREGARD RESULTS. SPECIMEN CONTAMINATED. 1.0 - 4.8 k/uL    Absolute Mono # PLEASE DISREGARD RESULTS. SPECIMEN CONTAMINATED. 0.1 - 1.3 k/uL    Absolute Eos # PLEASE DISREGARD RESULTS. SPECIMEN CONTAMINATED. 0.0 - 0.4 k/uL    Basophils Absolute PLEASE DISREGARD RESULTS. SPECIMEN CONTAMINATED. 0.0 - 0.2 k/uL   CMP    Collection Time: 11/18/22 11:40 AM   Result Value Ref Range    Glucose PLEASE DISREGARD RESULTS. SPECIMEN CONTAMINATED. 70 - 99 mg/dL    BUN PLEASE DISREGARD RESULTS. SPECIMEN CONTAMINATED. 8 - 23 mg/dL    Creatinine PLEASE DISREGARD RESULTS. SPECIMEN CONTAMINATED. 0.70 - 1.20 mg/dL    Est, Glom Filt Rate Can not be calculated >60 mL/min/1.73m2    Calcium PLEASE DISREGARD RESULTS. SPECIMEN CONTAMINATED. 8.6 - 10.4 mg/dL    Sodium PLEASE DISREGARD RESULTS. SPECIMEN CONTAMINATED. 135 - 144 mmol/L    Potassium PLEASE DISREGARD RESULTS. SPECIMEN CONTAMINATED. 3.7 - 5.3 mmol/L    Chloride PLEASE DISREGARD RESULTS. SPECIMEN CONTAMINATED. 98 - 107 mmol/L    CO2 PLEASE DISREGARD RESULTS. SPECIMEN CONTAMINATED. 20 - 31 mmol/L    Anion Gap Can not be calculated 9 - 17 mmol/L    Alkaline Phosphatase PLEASE DISREGARD RESULTS. SPECIMEN CONTAMINATED. 40 - 129 U/L    ALT PLEASE DISREGARD RESULTS. SPECIMEN CONTAMINATED. 5 - 41 U/L    AST PLEASE DISREGARD RESULTS. SPECIMEN CONTAMINATED. <40 U/L    Total Bilirubin PLEASE DISREGARD RESULTS. SPECIMEN CONTAMINATED. 0.3 - 1.2 mg/dL    Total Protein PLEASE DISREGARD RESULTS. SPECIMEN CONTAMINATED. 6.4 - 8.3 g/dL    Albumin PLEASE DISREGARD RESULTS. SPECIMEN CONTAMINATED. 3.5 - 5.2 g/dL   Lipase    Collection Time: 11/18/22 11:40 AM   Result Value Ref Range    Lipase PLEASE DISREGARD RESULTS. SPECIMEN CONTAMINATED.  13 - 60 U/L   Magnesium    Collection Time: 11/18/22 11:40 AM   Result Value Ref Range    Magnesium PLEASE DISREGARD RESULTS. SPECIMEN CONTAMINATED. 1.6 - 2.6 mg/dL   Troponin    Collection Time: 11/18/22 11:40 AM   Result Value Ref Range    Troponin, High Sensitivity PLEASE DISREGARD RESULTS. SPECIMEN CONTAMINATED. 0 - 22 ng/L   Procalcitonin    Collection Time: 11/18/22 11:40 AM   Result Value Ref Range    Procalcitonin PLEASE DISREGARD RESULTS. SPECIMEN CONTAMINATED. <0.09 ng/mL   Lactate, Sepsis    Collection Time: 11/18/22 11:40 AM   Result Value Ref Range    Lactic Acid, Sepsis PLEASE DISREGARD RESULTS. SPECIMEN CONTAMINATED. 0.5 - 1.9 mmol/L   SPECIMEN REJECTION    Collection Time: 11/18/22 11:40 AM   Result Value Ref Range    Specimen Source . BLOOD     Ordered Test PT, PTT, CDP     Reason for Rejection Unable to perform testing: No specimen received. Protime-INR    Collection Time: 11/18/22 11:45 AM   Result Value Ref Range    Protime 16.3 (H) 11.8 - 14.6 sec    INR 1.3    APTT    Collection Time: 11/18/22 11:45 AM   Result Value Ref Range    PTT 33.8 24.0 - 36.0 sec   Arterial Blood Gases    Collection Time: 11/18/22 11:58 AM   Result Value Ref Range    pH, Arterial 7.405 7.350 - 7.450    pCO2, Arterial 61.4 (HH) 35.0 - 45.0 mmHg    pO2, Arterial 81.2 80.0 - 100.0 mmHg    HCO3, Arterial 38.4 (H) 22.0 - 26.0 mmol/L    Positive Base Excess, Art 13.7 (H) 0.0 - 2.0 mmol/L    O2 Sat, Arterial 93.5 (L) 95 - 98 %    Carboxyhemoglobin 1.9 0 - 5 %    Methemoglobin 0.4 0.0 - 1.9 %    Pt Temp 37.0     O2 Device/Flow/% SALTER CANNULA     Respiratory Rate 22     Hussain Test PASS     Sample Site Right Radial Artery     Pt.  Position SEMI-FOWLERS    EKG 12 Lead    Collection Time: 11/18/22 12:00 PM   Result Value Ref Range    Ventricular Rate 102 BPM    Atrial Rate 102 BPM    P-R Interval 222 ms    QRS Duration 92 ms    Q-T Interval 348 ms    QTc Calculation (Bazett) 453 ms    P Axis 28 degrees    R Axis -45 degrees    T Axis 87 degrees   TYPE AND SCREEN    Collection Platelets 464 446 - 198 k/uL    MPV 9.8 6.0 - 12.0 fL    Seg Neutrophils 91 (H) 36 - 66 %    Lymphocytes 1 (L) 24 - 44 %    Monocytes 8 (H) 1 - 7 %    Eosinophils % 0 0 - 4 %    Basophils 0 0 - 2 %    Segs Absolute 15.19 (H) 1.3 - 9.1 k/uL    Absolute Lymph # 0.17 (L) 1.0 - 4.8 k/uL    Absolute Mono # 1.34 (H) 0.1 - 1.3 k/uL    Absolute Eos # 0.00 0.0 - 0.4 k/uL    Basophils Absolute 0.00 0.0 - 0.2 k/uL    Morphology TOXIC GRANULATION PRESENT     Morphology ANISOCYTOSIS PRESENT     Morphology 1+ ELLIPTOCYTES        Imaging/Diagnostics:  XR CHEST PORTABLE    Result Date: 11/18/2022  EXAMINATION: ONE XRAY VIEW OF THE CHEST 11/18/2022 11:58 am COMPARISON: 09/27/2022 HISTORY: ORDERING SYSTEM PROVIDED HISTORY: sob TECHNOLOGIST PROVIDED HISTORY: sob Reason for Exam: sob FINDINGS: Stable cardiomegaly. Bibasilar pulmonary opacities noted. No pulmonary vascular congestion or edema. No pneumothorax.      Bibasilar pulmonary opacities suggesting pneumonia       Assessment :      Primary Problem  Acute respiratory failure with hypoxia and hypercapnia [facility])    Active Hospital Problems    Diagnosis Date Noted    Acute respiratory failure with hypoxia and hypercapnia (HCC) [J96.01, J96.02] 11/18/2022     Priority: Medium    Aspiration pneumonia, unspecified aspiration pneumonia type, unspecified laterality, unspecified part of lung ([facility] [address] [J69.0] 11/18/2022     Priority: Medium       Plan:     Patient status Admit as inpatient in the  Medical ICU    Acute hypoxic hypercapnic respiratory failure 2/2 to bilateral aspiration pneumonia   -Patient was hypoxic on presentation (02 sat in 70s); currently saturating 97% on 8 L salter device  -CXR: bibasilar pulmonary opacities suggesting pneumonia  -CT scan of chest once medically stable  -WBCS elevated at 16.7  -ABG: pH 7.405, pC02 61.4, p02 81.2, HC03 38.4  -Cefepime and Clindamycin given in ED; continue IV Cefepime and Clindamycin   -Blood cultures were drawn and results are pending  -Lactate sepsis ordered   -Aspiration precautions  -Pulmonology/critical care on board     Acute on Chronic kidney disease  -Creatinine on admission 2.54 (baseline 1.31)  -Urine Creatinine, random urine sodium  -Renal ultrasound ordered   -Nephrology consulted      DM2  -Glucose on admission: 186  -Continue low dose sliding scale   -POCT x4  -initiate hypoglycemic protocol  -Continue Gabapentin 200 mg nightly     Hyponatremia  -Na on admission 132  -Continue to monitor   -Nephrology consulted     History of embolic stroke  -Continue Warfarin 10 mg po daily (unclear if Warfarin is for severe peripheral artery disease or for possible history of atrial fibrillation)  -Pharmacy to dose warfarin   -monitor H + H   -Telemetry continuous monitoring     CHF (EF (10-15%)  -EKG: sinus tachycardia with 1st degree AV block, left anterior fascicular block, and anterolateral infarct (age undetermined)  - Echo 9/28/22: EF < 20; RV function appears impaired; AV is sclerotic  -Hold Bumex  -Hold Coreg 6.25 mg BID  -Hold Lasix 40 mg daily  -Hold Entresto BID  -Hold Aldactone 25 mg daily      Depression/anxiety  -Continue Zoloft 50 mg daily    DVT prophylaxis: Warfarin 10 mg po daily  Code: Full code  Diet: NPO  PT/OT/SW consulted      Consultations:   IP CONSULT TO INTERNAL MEDICINE  IP CONSULT TO PULMONOLOGY  IP CONSULT TO NEPHROLOGY  IP CONSULT TO SOCIAL WORK  IP CONSULT TO CRITICAL CARE  PHARMACY TO [facility] Joe Aranda MD  11/18/2022  2:37 PM    Copy sent to Dr. Loyda Guerrero MD   Attending Physician Statement  I have discussed the care of Lance Tomlinson and I have examined the patient myselft and taken ros and hpi , including pertinent history and exam findings,  with the resident. I have reviewed the key elements of all parts of the encounter with the resident. I agree with the assessment, plan and orders as documented by the resident.     Patient transferred from outlying facility after there was concern for aspiration when PEG tube was flushed  Has multiple medical problem which includes hypertension, peripheral artery disease s/p bilateral below-knee amputation, heart failure with reduced ejection fraction, EF of 10%, chronically on anticoagulation possibly with history of peripheral artery disease.  ?  A. fib  Patient had possible aspiration, hyppotension, likely septic shock  Intubated  Maximum dose of Levophed, on vasopressin  EDELMIRA on CKD  Will start patient on heparin drip  Nephrology consulted  Pulm consulted  High chance of clinical deterioration, very sick  Seen in the ICU  CC time 40 minutes      Electronically signed by Halley Mattson MD

## 2022-11-18 NOTE — CONSULTS
[facility]   CONSULT NOTE:      DATE OF CONSULT 11/18/2022    REASON FOR CONSULTATION:  Hypoxemia      PCP PROVIDER DIMITRI, MD     CHIEF COMPLAINT: Hypoxemia possible aspiration    HISTORY OF PRESENT ILLNESS:     Rosa Aviles is an unfortunate 59-year-old male with a history of multiple medical issues including most recently a left BKA guillotine amputation on September 29 at [facility].  He was in the [facility] recovering, when apparently his G-tube was being flushed. Reportedly was under 200 mL of water. At any rate after this was done, the patient had nausea and vomiting and may have aspirated. He dropped his saturations to the 70s. He was then brought to [facility] for evaluation. Initially he was on 100% nonrebreather this has been now weaned to a 10 L salter device. His saturations are 98%. I did decrease him to 8 L when I came in the room. He has had an embolic stroke in the past and has some expressive aphasia. He has a G-tube in place. He has type 2 diabetes. He also has a right BKA. He has an EF of 10 to 15%. When he was at [facility] at the end of September, he had a CAT scan which showed bilateral cavitary nodules/lesions. I do not have access to that CAT scan at this time. In the emergency room, he was started on cefepime and clindamycin for wound aspiration pneumonia. Denies any use of tobacco      ALLERGIES:  Allergies   Allergen Reactions    Penicillins Rash       HOME MEDICATIONS:  Not in a hospital admission.       PAST MEDICAL HISTORY:  Past Medical History:   Diagnosis Date    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 ([address]     Acquired absence of right leg below knee (HCC)     Acute kidney failure (HCC)     Acute respiratory failure with hypoxia (HCC)     Cerebral infarction due to embolism (HCC)     Chronic kidney disease, unspecified     Chronic systolic (congestive) heart failure ([address]     Depression     Heart failure ([address] Hypertension     Muscle weakness (generalized)     Other symbolic dysfunctions     Peripheral vascular disease (HCC)     Type 2 diabetes mellitus ([facility] [address]        PAST SURGICAL HISTORY:  Past Surgical History:   Procedure Laterality Date    LEG AMPUTATION BELOW KNEE Bilateral           SOCIAL HISTORY:  Social History     Socioeconomic History    Marital status: Single     Spouse name: Not on file    Number of children: Not on file    Years of education: Not on file    Highest education level: Not on file   Occupational History    Not on file   Tobacco Use    Smoking status: Unknown    Smokeless tobacco: Not on file   Substance and Sexual Activity    Alcohol use: Not on file    Drug use: Not on file    Sexual activity: Not on file   Other Topics Concern    Not on file   Social History Narrative    Not on file     Social Determinants of Health     Financial Resource Strain: Not on file   Food Insecurity: Not on file   Transportation Needs: Not on file   Physical Activity: Not on file   Stress: Not on file   Social Connections: Not on file   Intimate Partner Violence: Not on file   Housing Stability: Not on file       FAMILY HISTORY:  History reviewed. No pertinent family history. REVIEW OF SYSTEMS:  All other systems reviewed and are negative. PHYSICAL EXAM:  Vital Signs Blood pressure 108/69, pulse (!) 102, temperature 98 °F (36.7 °C), resp. rate (!) 32, height 6' 3\" (1.905 m), weight 155 lb (70.3 kg), SpO2 97 %. Oxygen Amount and Delivery: O2 Flow Rate (L/min): 10 L/min    Admission Weight Weight: 155 lb (70.3 kg)    General Appearance   Ill appearing gentleman in no acute respiratory distress  Head  Normocephalic, without obvious abnormality, atraumatic    Eyes  conjunctivae/corneas clear. PERRL, EOM's intact.      ENT poor dentition  Neck  no adenopathy, no carotid bruit, no JVD, supple, symmetrical, trachea midline and thyroid not enlarged, symmetric, no tenderness/mass/nodules  Lungs coarse scattered rhonchi bilateral  Heart: regular rate and rhythm, S1, S2 normal, no murmur, click, rub or gallop  Abdomen  soft, non-tender; bowel sounds normal; no masses,  no organomegaly  Extremities  Bilateral BKA  Skin  Skin color, texture, turgor normal. No rashes or lesions  Neurologic: Alert has aphasia. Imaging  Chest x-ray shows lower lobe bilateral infiltrates        Lab Review  CBC     Lab Results   Component Value Date/Time    WBC 16.7 11/18/2022 12:54 PM    RBC 3.77 11/18/2022 12:54 PM    HGB 11.3 11/18/2022 12:54 PM    HCT 35.3 11/18/2022 12:54 PM     11/18/2022 12:54 PM    MCV 93.7 11/18/2022 12:54 PM    MCH 30.1 11/18/2022 12:54 PM    MCHC 32.1 11/18/2022 12:54 PM    RDW 16.7 11/18/2022 12:54 PM    LYMPHOPCT 1 11/18/2022 12:54 PM    MONOPCT 8 11/18/2022 12:54 PM    BASOPCT 0 11/18/2022 12:54 PM    MONOSABS 1.34 11/18/2022 12:54 PM    LYMPHSABS 0.17 11/18/2022 12:54 PM    EOSABS 0.00 11/18/2022 12:54 PM    BASOSABS 0.00 11/18/2022 12:54 PM       BMP   Lab Results   Component Value Date/Time     11/18/2022 12:25 PM    K 4.9 11/18/2022 12:25 PM    CL 86 11/18/2022 12:25 PM    CO2 36 11/18/2022 12:25 PM     11/18/2022 12:25 PM    CREATININE 2.54 11/18/2022 12:25 PM    GLUCOSE 186 11/18/2022 12:25 PM    CALCIUM 9.6 11/18/2022 12:25 PM       LFTS  Lab Results   Component Value Date/Time    ALKPHOS 132 11/18/2022 12:25 PM    ALT 19 11/18/2022 12:25 PM    AST 18 11/18/2022 12:25 PM    PROT 7.6 11/18/2022 12:25 PM    BILITOT 0.3 11/18/2022 12:25 PM    LABALBU 3.6 11/18/2022 12:25 PM       INR  Recent Labs     11/17/22  0630 11/18/22  1145   PROTIME 10.8 16.3*   INR 1.0 1.3       APTT  Recent Labs     11/18/22  1145   APTT 33.8       Lactic Acid  Lab Results   Component Value Date/Time    LACTA 0.6 11/18/2022 12:51 PM        PRO-BNP   No results for input(s): PROBNP in the last 72 hours.         ABGs:   Lab Results   Component Value Date/Time    PHART 7.405 11/18/2022 11:58 AM    PO2ART 81.2 11/18/2022 11:58 AM    FVK9YVP 61.4 11/18/2022 11:58 AM       No results found for: IFIO2, MODE, SETTIDVOL, SETPEEP      Impression    Acute hypoxic respiratory failure  Bilateral aspiration pneumonia  History of cavitary nodules/lesions  Recent left BKA  Type 2 diabetes  History of embolic stroke  Aphasia  Acute on chronic kidney disease, did have dialysis while at [address] [place] [address]:      Admit to ICU, intermediate unit  Agree with cefepime, could use either Flagyl or clindamycin as secondary antibiotic to cover anaerobes  More stable, will get a CT of the chest  Oxygen to keep saturations greater than 87%  Suggest nephrology consultation  Head of the bed elevated/other aspiration precaution  Resume Coumadin therapy monitor hemoglobin and hematocrit

## 2022-11-18 NOTE — PROGRESS NOTES
Pharmacy Note  Warfarin Consult    Miguel Ángel Mohr is a 79 y.o. male for whom pharmacy has been consulted to manage warfarin therapy. 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)     Acquired absence of right leg below knee (HCC)     Acute kidney failure (HCC)     Acute respiratory failure with hypoxia (HCC)     Cerebral infarction due to embolism (HCC)     Chronic kidney disease, unspecified     Chronic systolic (congestive) heart failure (HCC)     Depression     Heart failure (HCC)     Hypertension     Muscle weakness (generalized)     Other symbolic dysfunctions     Peripheral vascular disease ([facility]     Type 2 diabetes mellitus ([facility] [address]                 Recent Labs     11/18/22  1145   INR 1.3     Recent Labs     11/18/22  1140 11/18/22  1254   HGB PLEASE DISREGARD RESULTS. SPECIMEN CONTAMINATED. 11.3*   HCT PLEASE DISREGARD RESULTS. SPECIMEN CONTAMINATED. 35.3*   PLT PLEASE DISREGARD RESULTS. SPECIMEN CONTAMINATED. 237       Current warfarin drug-drug interactions: cefepime      Date             INR        Dose   11/18/2022            1.3       10    Daily PT/INR while inpatient. Thank you for the consult. Will continue to follow.    Josefa Rodriguez RPh  11/18/2022  2:37 PM

## 2022-11-18 NOTE — DISCHARGE INSTR - COC
Continuity of Care Form    Patient Name: Logan Childs   :  1955  MRN:  [id number]    Admit date:  2022  Discharge date:  2022    Code Status Order: Full Code   Advance Directives:     Admitting Physician:  Maida Vann MD  PCP: PROVIDER Windy Estrada MD    Discharging Nurse: [facility] Unit/Room#:   Discharging Unit Phone Number: [phone]    Emergency Contact:   Extended Emergency Contact Information  Primary Emergency Contact: Oziel Osman  Mobile Phone: [phone]  Relation: Brother/Sister    Past Surgical History:  Past Surgical History:   Procedure Laterality Date    LEG AMPUTATION BELOW KNEE Bilateral        Immunization History: There is no immunization history on file for this patient. Active Problems:  Patient Active Problem List   Diagnosis Code    Sepsis ([facility] [address] A41.9    Septic shock ([facility] [address] A41.9, R65.21    Acute respiratory failure with hypoxia and hypercapnia (HCC) J96.01, J96.02    Aspiration pneumonia, unspecified aspiration pneumonia type, unspecified laterality, unspecified part of lung ([facility] [address] J69.0       Isolation/Infection:   Isolation            No Isolation          Patient Infection Status       Infection Onset Added Last Indicated Last Indicated By Review Planned Expiration Resolved Resolved By    None active    Resolved    COVID-19 (Rule Out) 22 COVID-19, Rapid (Ordered)   22 Rule-Out Test Resulted    COVID-19 (Rule Out) 22 COVID-19, Rapid (Ordered)   22 Rule-Out Test Resulted            Nurse Assessment:  Last Vital Signs: BP (!) 78/44   Pulse (!) 103   Temp 97.7 °F (36.5 °C) (Axillary)   Resp 26   Ht 6' 3\" (1.905 m)   Wt 155 lb (70.3 kg)   SpO2 95%   BMI 19.37 kg/m²     Last documented pain score (0-10 scale):    Last Weight:   Wt Readings from Last 1 Encounters:   22 155 lb (70.3 kg)     Mental Status:   Oriented to person and place.  Disoriented to time and situation    IV Access:  - None    Nursing Mobility/ADLs:  Walking   Dependent  Transfer  Dependent  Bathing  Dependent  Dressing  Dependent  Toileting  Dependent  Feeding  Dependent  Med Admin  Dependent  Med Delivery   crushed and PEG    Wound Care Documentation and Therapy:  Wound Care Documentation:  Wound 11/21/22 Pretibial Left (Active)   Wound Image   11/21/22 1132   Wound Etiology Arterial 11/21/22 1132   Dressing Status Old drainage noted 11/21/22 1132   Wound Cleansed Cleansed with saline 11/21/22 1132   Dressing/Treatment Dry dressing; Ace wrap 11/21/22 1132   Wound Length (cm) 3.6 cm 11/21/22 1132   Wound Width (cm) 2.2 cm 11/21/22 1132   Wound Depth (cm) 0.1 cm 11/21/22 1132   Wound Surface Area (cm^2) 7.92 cm^2 11/21/22 1132   Wound Volume (cm^3) 0.792 cm^3 11/21/22 1132   Wound Assessment Pink/red;Purple/maroon;Slough;Eschar dry 11/21/22 1132   Drainage Amount Small 11/21/22 1132   Drainage Description Serosanguinous 11/21/22 1132   Odor None 11/21/22 1132   Angie-wound Assessment Blanchable erythema 11/21/22 1132   Margins Defined edges 11/21/22 1132   Number of days: 0       Wound 11/21/22 Leg Left;Lateral;Lower (Active)   Wound Image   11/21/22 1132   Wound Etiology Arterial 11/21/22 1132   Dressing Status Dry;New dressing applied 11/21/22 1132   Wound Cleansed Betadine/povidone iodine 11/21/22 1132   Dressing/Treatment Betadine swabs/povidone iodine;Dry dressing; Ace wrap 11/21/22 1132   Wound Length (cm) 6.5 cm 11/21/22 1132   Wound Width (cm) 2.7 cm 11/21/22 1132   Wound Depth (cm) 0.1 cm 11/21/22 1132   Wound Surface Area (cm^2) 17.55 cm^2 11/21/22 1132   Wound Volume (cm^3) 1.755 cm^3 11/21/22 1132   Wound Assessment Eschar dry 11/21/22 1132   Drainage Amount None 11/21/22 1132   Odor None 11/21/22 1132   Angie-wound Assessment Blanchable erythema 11/21/22 1132   Margins Attached edges 11/21/22 1132   Number of days: 0     Left pretib: Cleanse with soap and water, pat dry.  Apply triad cream to wound, cover with dry dressing, and wrap with ace bandage. Change daily and as needed if loose or soiled. Left lateral leg: Keep dry. Apply betadine swab daily, cover with dry dressing, and wrap with ace wrap. Change daily and as needed if loose or soiled. Elimination:  Continence: Bowel: No  Bladder: No  Urinary Catheter: None   Colostomy/Ileostomy/Ileal Conduit: No       Date of Last BM: 11/22/2022  No intake or output data in the 24 hours ending 11/18/22 1602  No intake/output data recorded. Safety Concerns: At Risk for Falls and Aspiration Risk    Impairments/Disabilities:      Speech and Amputation - Bilateral Lower extremities below the knee    Nutrition Therapy:  Current Nutrition Therapy:   - Tube Feedings:  Renal    Routes of Feeding: Gastrostomy Tube  Liquids: No Liquids  Daily Fluid Restriction: no  Last Modified Barium Swallow with Video (Video Swallowing Test): not done    Treatments at the Time of Hospital Discharge:   Respiratory Treatments: N/A  Oxygen Therapy:  is on oxygen at 3 L/min per nasal cannula. Ventilator:    - No ventilator support    Rehab Therapies: Physical Therapy and Occupational Therapy  Weight Bearing Status/Restrictions: No weight bearing restrictions  Other Medical Equipment (for information only, NOT a DME order): Other Treatments: Skilled Nursing assessment and monitoring. Medication education and monitoring per protocol. Patient's personal belongings (please select all that are sent with patient):  Pt did not come with any belongings.     RN SIGNATURE:  Electronically signed by Norris Taylor RN on 11/25/22 at 3:04 PM EST    CASE MANAGEMENT/SOCIAL WORK SECTION    Inpatient Status Date: 11/18/22    Readmission Risk Assessment Score:  Readmission Risk              Risk of Unplanned Readmission:  0           Discharging to Facility/ Agency   [facility]   Phone [phone]  Fax [phone]      Dialysis Facility (if applicable)   Name:  Address:  Dialysis Schedule:  Phone:  Fax:    / signature: Electronically signed by Venu Garcia RN on 11/23/22 at 8:00 AM EST    PHYSICIAN SECTION    Prognosis: Fair    Condition at Discharge: Stable    Rehab Potential (if transferring to Rehab): Fair    Recommended Labs or Other Treatments After Discharge: BMP in 1 week    Physician Certification: I certify the above information and transfer of Sergio Rodriguez  is necessary for the continuing treatment of the diagnosis listed and that he requires [facility] for less 30 days.      Update Admission H&P: No change in H&P    PHYSICIAN SIGNATURE:  Electronically signed by Ana M Sanchez MD on 11/25/22 at 12:38 PM EST

## 2022-11-19 ENCOUNTER — APPOINTMENT (OUTPATIENT)
Dept: ULTRASOUND IMAGING | Age: 67
DRG: 871 | End: 2022-11-19
Payer: MEDICARE

## 2022-11-19 ENCOUNTER — APPOINTMENT (OUTPATIENT)
Dept: GENERAL RADIOLOGY | Age: 67
DRG: 871 | End: 2022-11-19
Payer: MEDICARE

## 2022-11-19 LAB
ABSOLUTE BANDS #: 1.25 K/UL (ref 0–1)
ABSOLUTE EOS #: 0.18 K/UL (ref 0–0.4)
ABSOLUTE LYMPH #: 0.36 K/UL (ref 1–4.8)
ABSOLUTE MONO #: 1.07 K/UL (ref 0.1–1.3)
ALLEN TEST: ABNORMAL
ANION GAP SERPL CALCULATED.3IONS-SCNC: 12 MMOL/L (ref 9–17)
ANTI-XA UNFRAC HEPARIN: <0.1 IU/L (ref 0.3–0.7)
BANDS: 7 % (ref 0–10)
BASOPHILS # BLD: 1 % (ref 0–2)
BASOPHILS ABSOLUTE: 0.18 K/UL (ref 0–0.2)
BUN BLDV-MCNC: 140 MG/DL (ref 8–23)
CALCIUM SERPL-MCNC: 8.8 MG/DL (ref 8.6–10.4)
CARBOXYHEMOGLOBIN: 1.4 % (ref 0–5)
CHLORIDE BLD-SCNC: 90 MMOL/L (ref 98–107)
CO2: 32 MMOL/L (ref 20–31)
CORTISOL: 20.2 UG/DL (ref 2.7–18.4)
CREAT SERPL-MCNC: 2.48 MG/DL (ref 0.7–1.2)
EOSINOPHILS RELATIVE PERCENT: 1 % (ref 0–4)
FIO2: 30
GFR SERPL CREATININE-BSD FRML MDRD: 28 ML/MIN/1.73M2
GLUCOSE BLD-MCNC: 147 MG/DL (ref 75–110)
GLUCOSE BLD-MCNC: 223 MG/DL (ref 75–110)
GLUCOSE BLD-MCNC: 231 MG/DL (ref 75–110)
GLUCOSE BLD-MCNC: 284 MG/DL (ref 75–110)
GLUCOSE BLD-MCNC: 309 MG/DL (ref 70–99)
GLUCOSE BLD-MCNC: 362 MG/DL (ref 75–110)
HCO3 ARTERIAL: 32.9 MMOL/L (ref 22–26)
HCT VFR BLD CALC: 24.2 % (ref 41–53)
HCT VFR BLD CALC: 25.6 % (ref 41–53)
HEMOGLOBIN: 8.1 G/DL (ref 13.5–17.5)
HEMOGLOBIN: 8.4 G/DL (ref 13.5–17.5)
LYMPHOCYTES # BLD: 2 % (ref 24–44)
MCH RBC QN AUTO: 30.7 PG (ref 26–34)
MCHC RBC AUTO-ENTMCNC: 32.9 G/DL (ref 31–37)
MCV RBC AUTO: 93.2 FL (ref 80–100)
METHEMOGLOBIN: 0.5 % (ref 0–1.9)
MODE: ABNORMAL
MONOCYTES # BLD: 6 % (ref 1–7)
MORPHOLOGY: ABNORMAL
MORPHOLOGY: ABNORMAL
O2 DEVICE/FLOW/%: ABNORMAL
O2 SAT, ARTERIAL: 96.9 % (ref 95–98)
PATIENT TEMP: 37.1
PCO2 ARTERIAL: 51 MMHG (ref 35–45)
PDW BLD-RTO: 16.6 % (ref 11.5–14.9)
PEEP/CPAP: 8
PH ARTERIAL: 7.42 (ref 7.35–7.45)
PLATELET # BLD: 183 K/UL (ref 150–450)
PMV BLD AUTO: 10.1 FL (ref 6–12)
PO2 ARTERIAL: 97.4 MMHG (ref 80–100)
POSITIVE BASE EXCESS, ART: 8.4 MMOL/L (ref 0–2)
POTASSIUM SERPL-SCNC: 4.8 MMOL/L (ref 3.7–5.3)
PT. POSITION: ABNORMAL
RBC # BLD: 2.74 M/UL (ref 4.5–5.9)
RESPIRATORY RATE: 16
SAMPLE SITE: ABNORMAL
SEG NEUTROPHILS: 83 % (ref 36–66)
SEGMENTED NEUTROPHILS ABSOLUTE COUNT: 14.86 K/UL (ref 1.3–9.1)
SET RATE: 16
SODIUM BLD-SCNC: 134 MMOL/L (ref 135–144)
TEXT FOR RESPIRATORY: ABNORMAL
TOTAL RATE: 18
VT: 400
WBC # BLD: 17.9 K/UL (ref 3.5–11)

## 2022-11-19 PROCEDURE — 6360000002 HC RX W HCPCS

## 2022-11-19 PROCEDURE — 2580000003 HC RX 258: Performed by: INTERNAL MEDICINE

## 2022-11-19 PROCEDURE — A4216 STERILE WATER/SALINE, 10 ML: HCPCS | Performed by: INTERNAL MEDICINE

## 2022-11-19 PROCEDURE — 2000000000 HC ICU R&B

## 2022-11-19 PROCEDURE — 6360000002 HC RX W HCPCS: Performed by: INTERNAL MEDICINE

## 2022-11-19 PROCEDURE — 85520 HEPARIN ASSAY: CPT

## 2022-11-19 PROCEDURE — 82805 BLOOD GASES W/O2 SATURATION: CPT

## 2022-11-19 PROCEDURE — 85018 HEMOGLOBIN: CPT

## 2022-11-19 PROCEDURE — 2500000003 HC RX 250 WO HCPCS: Performed by: INTERNAL MEDICINE

## 2022-11-19 PROCEDURE — 99291 CRITICAL CARE FIRST HOUR: CPT | Performed by: INTERNAL MEDICINE

## 2022-11-19 PROCEDURE — 80048 BASIC METABOLIC PNL TOTAL CA: CPT

## 2022-11-19 PROCEDURE — 36415 COLL VENOUS BLD VENIPUNCTURE: CPT

## 2022-11-19 PROCEDURE — 85014 HEMATOCRIT: CPT

## 2022-11-19 PROCEDURE — 6370000000 HC RX 637 (ALT 250 FOR IP)

## 2022-11-19 PROCEDURE — 85025 COMPLETE CBC W/AUTO DIFF WBC: CPT

## 2022-11-19 PROCEDURE — 6370000000 HC RX 637 (ALT 250 FOR IP): Performed by: INTERNAL MEDICINE

## 2022-11-19 PROCEDURE — 76775 US EXAM ABDO BACK WALL LIM: CPT

## 2022-11-19 PROCEDURE — 94003 VENT MGMT INPAT SUBQ DAY: CPT

## 2022-11-19 PROCEDURE — 71045 X-RAY EXAM CHEST 1 VIEW: CPT

## 2022-11-19 PROCEDURE — P9047 ALBUMIN (HUMAN), 25%, 50ML: HCPCS | Performed by: INTERNAL MEDICINE

## 2022-11-19 PROCEDURE — C9113 INJ PANTOPRAZOLE SODIUM, VIA: HCPCS | Performed by: INTERNAL MEDICINE

## 2022-11-19 PROCEDURE — 2580000003 HC RX 258

## 2022-11-19 RX ORDER — INSULIN GLARGINE 100 [IU]/ML
10 INJECTION, SOLUTION SUBCUTANEOUS 2 TIMES DAILY
Status: DISCONTINUED | OUTPATIENT
Start: 2022-11-19 | End: 2022-11-20

## 2022-11-19 RX ORDER — INSULIN LISPRO 100 [IU]/ML
0-16 INJECTION, SOLUTION INTRAVENOUS; SUBCUTANEOUS EVERY 4 HOURS
Status: DISCONTINUED | OUTPATIENT
Start: 2022-11-19 | End: 2022-11-22

## 2022-11-19 RX ORDER — INSULIN LISPRO 100 [IU]/ML
0-4 INJECTION, SOLUTION INTRAVENOUS; SUBCUTANEOUS NIGHTLY
Status: DISCONTINUED | OUTPATIENT
Start: 2022-11-19 | End: 2022-11-22

## 2022-11-19 RX ORDER — INSULIN LISPRO 100 [IU]/ML
0-16 INJECTION, SOLUTION INTRAVENOUS; SUBCUTANEOUS
Status: DISCONTINUED | OUTPATIENT
Start: 2022-11-19 | End: 2022-11-19

## 2022-11-19 RX ORDER — INSULIN GLARGINE 100 [IU]/ML
10 INJECTION, SOLUTION SUBCUTANEOUS DAILY
Status: DISCONTINUED | OUTPATIENT
Start: 2022-11-19 | End: 2022-11-19

## 2022-11-19 RX ADMIN — ALBUMIN (HUMAN) 25 G: 0.25 INJECTION, SOLUTION INTRAVENOUS at 08:12

## 2022-11-19 RX ADMIN — INSULIN LISPRO 4 UNITS: 100 INJECTION, SOLUTION INTRAVENOUS; SUBCUTANEOUS at 11:53

## 2022-11-19 RX ADMIN — ALBUMIN (HUMAN) 25 G: 0.25 INJECTION, SOLUTION INTRAVENOUS at 17:05

## 2022-11-19 RX ADMIN — MINERAL OIL, PETROLATUM: 425; 568 OINTMENT OPHTHALMIC at 08:00

## 2022-11-19 RX ADMIN — INSULIN LISPRO 8 UNITS: 100 INJECTION, SOLUTION INTRAVENOUS; SUBCUTANEOUS at 08:21

## 2022-11-19 RX ADMIN — MINERAL OIL, PETROLATUM: 425; 568 OINTMENT OPHTHALMIC at 11:57

## 2022-11-19 RX ADMIN — SODIUM CHLORIDE, PRESERVATIVE FREE 40 MG: 5 INJECTION INTRAVENOUS at 21:17

## 2022-11-19 RX ADMIN — CLINDAMYCIN IN 5 PERCENT DEXTROSE 600 MG: 12 INJECTION, SOLUTION INTRAVENOUS at 05:12

## 2022-11-19 RX ADMIN — CLINDAMYCIN IN 5 PERCENT DEXTROSE 600 MG: 12 INJECTION, SOLUTION INTRAVENOUS at 13:58

## 2022-11-19 RX ADMIN — INSULIN LISPRO 4 UNITS: 100 INJECTION, SOLUTION INTRAVENOUS; SUBCUTANEOUS at 21:18

## 2022-11-19 RX ADMIN — POLYVINYL ALCOHOL 1 DROP: 14 SOLUTION/ DROPS OPHTHALMIC at 21:17

## 2022-11-19 RX ADMIN — HEPARIN SODIUM 4000 UNITS: 1000 INJECTION INTRAVENOUS; SUBCUTANEOUS at 19:18

## 2022-11-19 RX ADMIN — MINERAL OIL, PETROLATUM: 425; 568 OINTMENT OPHTHALMIC at 21:17

## 2022-11-19 RX ADMIN — POLYVINYL ALCOHOL 1 DROP: 14 SOLUTION/ DROPS OPHTHALMIC at 02:13

## 2022-11-19 RX ADMIN — HEPARIN SODIUM 16 UNITS/KG/HR: 10000 INJECTION, SOLUTION INTRAVENOUS at 19:22

## 2022-11-19 RX ADMIN — CHLORHEXIDINE GLUCONATE 0.12% ORAL RINSE 15 ML: 1.2 LIQUID ORAL at 08:01

## 2022-11-19 RX ADMIN — DEXTROSE MONOHYDRATE 5 MCG/MIN: 50 INJECTION, SOLUTION INTRAVENOUS at 23:05

## 2022-11-19 RX ADMIN — SERTRALINE HYDROCHLORIDE 50 MG: 50 TABLET ORAL at 08:01

## 2022-11-19 RX ADMIN — CLINDAMYCIN IN 5 PERCENT DEXTROSE 600 MG: 12 INJECTION, SOLUTION INTRAVENOUS at 21:37

## 2022-11-19 RX ADMIN — SODIUM CHLORIDE: 9 INJECTION, SOLUTION INTRAVENOUS at 02:47

## 2022-11-19 RX ADMIN — INSULIN GLARGINE 10 UNITS: 100 INJECTION, SOLUTION SUBCUTANEOUS at 21:18

## 2022-11-19 RX ADMIN — CEFEPIME 1000 MG: 1 INJECTION, POWDER, FOR SOLUTION INTRAMUSCULAR; INTRAVENOUS at 11:50

## 2022-11-19 RX ADMIN — POLYVINYL ALCOHOL 1 DROP: 14 SOLUTION/ DROPS OPHTHALMIC at 14:15

## 2022-11-19 RX ADMIN — VASOPRESSIN 0.02 UNITS/MIN: 20 INJECTION INTRAVENOUS at 02:09

## 2022-11-19 RX ADMIN — SODIUM CHLORIDE, PRESERVATIVE FREE 40 MG: 5 INJECTION INTRAVENOUS at 08:02

## 2022-11-19 RX ADMIN — INSULIN GLARGINE 10 UNITS: 100 INJECTION, SOLUTION SUBCUTANEOUS at 08:21

## 2022-11-19 RX ADMIN — CHLORHEXIDINE GLUCONATE 0.12% ORAL RINSE 15 ML: 1.2 LIQUID ORAL at 21:18

## 2022-11-19 RX ADMIN — CEFEPIME 1000 MG: 1 INJECTION, POWDER, FOR SOLUTION INTRAMUSCULAR; INTRAVENOUS at 23:05

## 2022-11-19 RX ADMIN — ALBUMIN (HUMAN) 25 G: 0.25 INJECTION, SOLUTION INTRAVENOUS at 00:52

## 2022-11-19 RX ADMIN — PROPOFOL 10 MCG/KG/MIN: 10 INJECTION, EMULSION INTRAVENOUS at 05:35

## 2022-11-19 RX ADMIN — SODIUM CHLORIDE, PRESERVATIVE FREE 10 ML: 5 INJECTION INTRAVENOUS at 21:00

## 2022-11-19 RX ADMIN — MINERAL OIL, PETROLATUM: 425; 568 OINTMENT OPHTHALMIC at 17:06

## 2022-11-19 RX ADMIN — POLYVINYL ALCOHOL 1 DROP: 14 SOLUTION/ DROPS OPHTHALMIC at 10:17

## 2022-11-19 RX ADMIN — HEPARIN SODIUM 12 UNITS/KG/HR: 10000 INJECTION, SOLUTION INTRAVENOUS at 13:14

## 2022-11-19 ASSESSMENT — PULMONARY FUNCTION TESTS
PIF_VALUE: 21
PIF_VALUE: 22
PIF_VALUE: 23
PIF_VALUE: 20
PIF_VALUE: 22
PIF_VALUE: 25
PIF_VALUE: 21
PIF_VALUE: 23
PIF_VALUE: 23
PIF_VALUE: 21
PIF_VALUE: 22
PIF_VALUE: 20
PIF_VALUE: 22
PIF_VALUE: 20
PIF_VALUE: 21
PIF_VALUE: 22
PIF_VALUE: 24
PIF_VALUE: 22
PIF_VALUE: 22
PIF_VALUE: 23
PIF_VALUE: 22
PIF_VALUE: 21
PIF_VALUE: 21
PIF_VALUE: 22
PIF_VALUE: 22
PIF_VALUE: 25
PIF_VALUE: 21
PIF_VALUE: 26
PIF_VALUE: 20
PIF_VALUE: 19
PIF_VALUE: 26
PIF_VALUE: 21
PIF_VALUE: 22
PIF_VALUE: 25
PIF_VALUE: 23
PIF_VALUE: 22
PIF_VALUE: 21
PIF_VALUE: 22
PIF_VALUE: 23
PIF_VALUE: 26
PIF_VALUE: 23
PIF_VALUE: 22
PIF_VALUE: 23
PIF_VALUE: 22
PIF_VALUE: 21
PIF_VALUE: 21
PIF_VALUE: 24
PIF_VALUE: 21
PIF_VALUE: 22
PIF_VALUE: 24
PIF_VALUE: 24
PIF_VALUE: 21
PIF_VALUE: 22
PIF_VALUE: 23
PIF_VALUE: 21
PIF_VALUE: 21
PIF_VALUE: 22
PIF_VALUE: 21
PIF_VALUE: 22
PIF_VALUE: 24
PIF_VALUE: 27
PIF_VALUE: 24
PIF_VALUE: 21
PIF_VALUE: 27
PIF_VALUE: 21
PIF_VALUE: 23
PIF_VALUE: 21
PIF_VALUE: 26
PIF_VALUE: 20
PIF_VALUE: 21
PIF_VALUE: 22
PIF_VALUE: 21
PIF_VALUE: 23
PIF_VALUE: 23
PIF_VALUE: 22
PIF_VALUE: 21
PIF_VALUE: 20
PIF_VALUE: 21
PIF_VALUE: 21
PIF_VALUE: 22
PIF_VALUE: 23
PIF_VALUE: 22
PIF_VALUE: 27
PIF_VALUE: 22
PIF_VALUE: 27
PIF_VALUE: 22
PIF_VALUE: 27
PIF_VALUE: 21

## 2022-11-19 ASSESSMENT — PAIN SCALES - WONG BAKER

## 2022-11-19 ASSESSMENT — PAIN SCALES - GENERAL: PAINLEVEL_OUTOF10: 0

## 2022-11-19 NOTE — PROGRESS NOTES
ICU Progress Note (Vent)   [facility]    Patient - Dillon Arellano,  Age - 79 y.o.    - 1955      Room Number -    N -  [id number]   [facility]  Date of Admission -  2022 11:46 AM    Events of Past 24 Hours   Remains on norepinephrine and vasopressin although norepinephrine drip has been decreased. Also on propofol for sedation. Vitals    height is 6' 3\" (1.905 m) and weight is 150 lb 5.7 oz (68.2 kg). His oral temperature is 98 °F (36.7 °C). His blood pressure is 99/59 (abnormal) and his pulse is 75. His respiration is 21 and oxygen saturation is 100%. Temperature Range: Temp: 98 °F (36.7 °C) Temp  Av.1 °F (36.7 °C)  Min: 97.7 °F (36.5 °C)  Max: 98.6 °F (37 °C)  BP Range:  Systolic (06TJX), ODF:022 , Min:53 , VLH:130     Diastolic (20NCA), BF, Min:28, Max:98    Pulse Range: Pulse  Av  Min: 70  Max: 107  Respiration Range: Resp  Av.4  Min: 9  Max: 36  Current Pulse Ox[de-identified]  SpO2: 100 %  24HR Pulse Ox Range:  SpO2  Av.1 %  Min: 89 %  Max: 100 %  Oxygen Amount and Delivery: O2 Flow Rate (L/min): 8 L/min      Wt Readings from Last 3 Encounters:   22 150 lb 5.7 oz (68.2 kg)   22 156 lb 8.4 oz (71 kg)   22 150 lb (68 kg)     I/O     Intake/Output Summary (Last 24 hours) at 2022 0838  Last data filed at 2022 0530  Gross per 24 hour   Intake 1320.77 ml   Output 1200 ml   Net 120.77 ml     I/O last 3 completed shifts:   In: 1320.8 [I.V.:893.9; IV Piggyback:426.9]  Out: 1200 [Urine:650; Emesis/NG output:550]     DRAIN/TUBE OUTPUT:     Invasive Lines   ETT Day -   2  Lines -right femoral vein 2    ICP PRESSURE RANGE:  No data recorded  CVP PRESSURE RANGE:  No data recorded  Mechanical Ventilation Data   SETTINGS (Comprehensive)  Vent Information  Ventilator Initiate: Yes  Vent Mode: AC/PRVC  Additional Respiratory Assessments  Heart Rate: 75  Resp: 21  SpO2: 100 %  End Tidal CO2: 36 (%)       ABGs:   Lab Results   Component Value Date/Time    PHART 7.418 11/19/2022 06:00 AM    PO2ART 97.4 11/19/2022 06:00 AM    JEX1FPH 51.0 11/19/2022 06:00 AM       Lab Results   Component Value Date/Time    MODE PRVC 11/19/2022 06:00 AM         Medications   IV   sodium chloride      dextrose      sodium chloride Stopped (11/19/22 0512)    vasopressin (Septic Shock) infusion 0.04 Units/min (11/19/22 0530)    norepinephrine (LEVOPHED) infusion 5 mcg/min (11/19/22 0530)    propofol 10 mcg/kg/min (11/19/22 0535)    heparin (PORCINE) Infusion        insulin lispro  0-16 Units SubCUTAneous TID WC    insulin lispro  0-4 Units SubCUTAneous Nightly    insulin glargine  10 Units SubCUTAneous Daily    sodium chloride flush  5-40 mL IntraVENous 2 times per day    cefepime  1,000 mg IntraVENous Q12H    [Held by provider] bumetanide  1 mg Per G Tube Daily    [Held by provider] carvedilol  6.25 mg Per G Tube BID    [Held by provider] furosemide  40 mg Per G Tube Daily    [Held by provider] gabapentin  200 mg Per G Tube Nightly    [Held by provider] sacubitril-valsartan  1 tablet Per G Tube BID    [Held by provider] spironolactone  25 mg Per G Tube Daily    sertraline  50 mg Per G Tube Daily    albumin human  25 g IntraVENous Q8H    clindamycin (CLEOCIN) IV  600 mg IntraVENous Q8H    polyvinyl alcohol  1 drop Both Eyes Q4H    And    artificial tears   Both Eyes Q4H    chlorhexidine  15 mL Mouth/Throat BID    heparin (porcine)  4,000 Units IntraVENous Once    pantoprazole (PROTONIX) 40 mg injection  40 mg IntraVENous Q12H       Diet/Nutrition   Diet NPO    Exam   VITALS    height is 6' 3\" (1.905 m) and weight is 150 lb 5.7 oz (68.2 kg). His oral temperature is 98 °F (36.7 °C). His blood pressure is 99/59 (abnormal) and his pulse is 75. His respiration is 21 and oxygen saturation is 100%.    Ventilator Settings (Basic)  Vent Mode: AC/PRVC Resp Rate (Set): 16 bmp/Vt (Set, mL): 500 mL/ /FiO2 : 40 %    Constitutional - Sedated  General Appearance ill-appearing  HEENT - Life support devices in place (ET, OG),normocephalic, atraumatic. PERRLA  Lungs - Chest expands equally, no wheezes, crackles at bases right greater than left. Cardiovascular - Heart sounds are normal.  normal rate and rhythm regular, no murmur, gallop or rub. Abdomen - soft, nontender, nondistended, no masses or organomegaly  Neurologic - CN II-XII are grossly intact.  There are no focal motor deficits  Skin - no bruising or bleeding  Extremities - no cyanosis, clubbing or edema; bilateral BKA    Lab Results   CBC     Lab Results   Component Value Date/Time    WBC 17.9 11/19/2022 05:57 AM    RBC 2.74 11/19/2022 05:57 AM    HGB 8.4 11/19/2022 05:57 AM    HCT 25.6 11/19/2022 05:57 AM     11/19/2022 05:57 AM    MCV 93.2 11/19/2022 05:57 AM    MCH 30.7 11/19/2022 05:57 AM    MCHC 32.9 11/19/2022 05:57 AM    RDW 16.6 11/19/2022 05:57 AM    LYMPHOPCT 2 11/19/2022 05:57 AM    MONOPCT 6 11/19/2022 05:57 AM    BASOPCT 1 11/19/2022 05:57 AM    MONOSABS 1.07 11/19/2022 05:57 AM    LYMPHSABS 0.36 11/19/2022 05:57 AM    EOSABS 0.18 11/19/2022 05:57 AM    BASOSABS 0.18 11/19/2022 05:57 AM       BMP   Lab Results   Component Value Date/Time     11/19/2022 05:57 AM    K 4.8 11/19/2022 05:57 AM    CL 90 11/19/2022 05:57 AM    CO2 32 11/19/2022 05:57 AM     11/19/2022 05:57 AM    CREATININE 2.48 11/19/2022 05:57 AM    GLUCOSE 309 11/19/2022 05:57 AM    CALCIUM 8.8 11/19/2022 05:57 AM       LFTS  Lab Results   Component Value Date/Time    ALKPHOS 132 11/18/2022 12:25 PM    ALT 19 11/18/2022 12:25 PM    AST 18 11/18/2022 12:25 PM    PROT 7.6 11/18/2022 12:25 PM    BILITOT 0.3 11/18/2022 12:25 PM    LABALBU 3.6 11/18/2022 12:25 PM       INR  Recent Labs     11/17/22  0630 11/18/22  1145 11/18/22 1945   PROTIME 10.8 16.3* 18.2*   INR 1.0 1.3 1.5       APTT  Recent Labs     11/18/22  1145 11/18/22 1945   APTT 33.8 29.7       Lactic Acid  Lab Results   Component Value Date/Time    LACTA 0.6 11/18/2022 12:51 PM        BNP   No results for input(s): BNP in the last 72 hours. 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 Keep O2 sat > 88%  No plans for weaning today, just got intubated yesterday  Has chronically elevated PCO2, unclear etiology no history of significant tobacco use as far as I am aware  Hypoxia i improved     Hemodynamics   Remains unstable although slightly better than yesterday evening  Would use norepinephrine as a primary pressor wean vasopressin off  Heparin drip also started due to elevated troponins  Gastrointestinal/Nutrition   No objection to starting tube feeds today, renal formulation  GI   Renal   Await further input from nephrology, patient did have dialysis at [facility]    Infectious Disease   Continue clindamycin and cefepime for aspiration pneumonia and healthcare acquired pneumonia    Hematology/Oncology   Monitor CBC and platelet count    Endocrine   Blood sugars are elevated, increase Lantus 10 units twice daily, increase monitoring of blood sugars to every 4 hours    Social/Spiritual/DNR/Disposition/Other     Spoke to patient's brother Milagro Rodriguez is to continue therapy but if it appears to be prolonged or patient unable to wean off ventilator will make comfort care down the road.   His brother is leaning against any repeat dialysis    Critical Care Time   35 min    Electronically signed by Brad Robert MD on 11/19/2022 at 8:38 AM

## 2022-11-19 NOTE — PLAN OF CARE
Problem: Discharge Planning  Goal: Discharge to home or other facility with appropriate resources  Outcome: Progressing     Problem: Chronic Conditions and Co-morbidities  Goal: Patient's chronic conditions and co-morbidity symptoms are monitored and maintained or improved  Outcome: Progressing     Problem: Safety - Medical Restraint  Goal: Remains free of injury from restraints (Restraint for Interference with Medical Device)  Outcome: Progressing     Problem: Pain  Goal: Verbalizes/displays adequate comfort level or baseline comfort level  Outcome: Progressing

## 2022-11-19 NOTE — PROGRESS NOTES
elevated at 16.7. Hgb was 11.3. ABGs showed pH 7.405, pC02 61.4, p02 81.2, Hc03 38.4. Blood cultures were drawn and results are pending. The patient was given normal saline bolus. The patient was admitted for acute hypoxic, hypercapnic respiratory failure secondary to suspected bilateral aspiration pneumonia    Review of Systems:     Review of Systems    Unable to perform due to patient status. Medications: Allergies:     Allergies   Allergen Reactions    Penicillins Rash       Current Meds:   Scheduled Meds:    insulin lispro  0-4 Units SubCUTAneous Nightly    insulin glargine  10 Units SubCUTAneous BID    insulin lispro  0-16 Units SubCUTAneous Q4H    sodium chloride flush  5-40 mL IntraVENous 2 times per day    cefepime  1,000 mg IntraVENous Q12H    [Held by provider] bumetanide  1 mg Per G Tube Daily    [Held by provider] carvedilol  6.25 mg Per G Tube BID    [Held by provider] furosemide  40 mg Per G Tube Daily    [Held by provider] gabapentin  200 mg Per G Tube Nightly    [Held by provider] sacubitril-valsartan  1 tablet Per G Tube BID    [Held by provider] spironolactone  25 mg Per G Tube Daily    sertraline  50 mg Per G Tube Daily    albumin human  25 g IntraVENous Q8H    clindamycin (CLEOCIN) IV  600 mg IntraVENous Q8H    polyvinyl alcohol  1 drop Both Eyes Q4H    And    artificial tears   Both Eyes Q4H    chlorhexidine  15 mL Mouth/Throat BID    heparin (porcine)  4,000 Units IntraVENous Once    pantoprazole (PROTONIX) 40 mg injection  40 mg IntraVENous Q12H     Continuous Infusions:    sodium chloride      dextrose      sodium chloride Stopped (11/19/22 0512)    vasopressin (Septic Shock) infusion 0.04 Units/min (11/19/22 0530)    norepinephrine (LEVOPHED) infusion 5 mcg/min (11/19/22 0530)    propofol 10 mcg/kg/min (11/19/22 0535)    heparin (PORCINE) Infusion       PRN Meds: sodium chloride flush, sodium chloride, ondansetron **OR** ondansetron, polyethylene glycol, acetaminophen **OR** acetaminophen, potassium chloride, glucose, dextrose bolus **OR** dextrose bolus, glucagon (rDNA), dextrose, HYDROcodone-acetaminophen, fentanNYL, heparin (porcine), heparin (porcine)    Data:     Past Medical History:   has a past medical history of 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 ([facility] [address] Acquired absence of right leg below knee ([facility] [address] Acute kidney failure ([facility] [address] Acute respiratory failure with hypoxia ([facility] [address] Cerebral infarction due to embolism ([facility] [address] Chronic kidney disease, unspecified, Chronic systolic (congestive) heart failure ([facility] [address] Depression, Heart failure ([facility] [address] Hypertension, Muscle weakness (generalized), Other symbolic dysfunctions, Peripheral vascular disease ([facility] [address] and [facility] Social History:        Family History: History reviewed. No pertinent family history. Vitals:  BP (!) 98/56   Pulse 81   Temp 98 °F (36.7 °C) (Oral)   Resp 20   Ht 6' 3\" (1.905 m)   Wt 150 lb 5.7 oz (68.2 kg)   SpO2 100%   BMI 18.79 kg/m²   Temp (24hrs), Av.1 °F (36.7 °C), Min:97.7 °F (36.5 °C), Max:98.6 °F (37 °C)    Recent Labs     22  0712   POCGLU 284*       I/O(24Hr): Intake/Output Summary (Last 24 hours) at 2022 1056  Last data filed at 2022 0530  Gross per 24 hour   Intake 1320.77 ml   Output 1200 ml   Net 120.77 ml       Labs:  [unfilled]    Lab Results   Component Value Date/Time    SPECIAL RIGHT HAND 2022 11:56 AM     Lab Results   Component Value Date/Time    CULTURE PENDING 2022 06:23 PM       [unfilled]    Radiology:    US RENAL LIMITED    Result Date: 2022  EXAMINATION: ULTRASOUND OF THE KIDNEYS 2022 6:22 am COMPARISON: None. HISTORY: ORDERING SYSTEM PROVIDED HISTORY: EDELMIRA TECHNOLOGIST PROVIDED HISTORY: EDELMIRA FINDINGS: The right kidney measures 10.9 cm in length and the left kidney measures 10.9 cm in length. Bilateral renal cysts measuring measuring up to 2.5 cm on the right and 3.1 cm on the left.  Kidneys otherwise demonstrate normal cortical echogenicity. No hydronephrosis or intrarenal stones. No focal lesions. Benign bilateral renal cysts     XR CHEST PORTABLE    Result Date: 11/19/2022  EXAMINATION: ONE XRAY VIEW OF THE CHEST 11/19/2022 5:49 am COMPARISON: 11/18/2022 HISTORY: ORDERING SYSTEM PROVIDED HISTORY: ETT placement TECHNOLOGIST PROVIDED HISTORY: ETT placement Reason for Exam: ETT placement Additional signs and symptoms: ETT placement Relevant Medical/Surgical History: ETT placement FINDINGS: Endotracheal tube approximately 6 cm above the amparo. Enteric catheter courses into the abdomen. Heart size stable. Bibasilar pulmonary opacities. No pneumothorax. No new airspace disease. Persistent bibasilar pulmonary opacities could represent atelectasis or pneumonia     XR CHEST PORTABLE    Result Date: 11/18/2022  EXAMINATION: ONE XRAY VIEW OF THE CHEST 11/18/2022 6:00 pm COMPARISON: Radiograph performed earlier the same day HISTORY: ORDERING SYSTEM PROVIDED HISTORY: intubated TECHNOLOGIST PROVIDED HISTORY: intubated Reason for Exam: Intubation FINDINGS: Endotracheal tube terminates 7 cm above the amparo. Enteric tube is below the diaphragm. Cardiomediastinal silhouette is unchanged in size. Aortic atherosclerosis. No large pleural effusion or pneumothorax. There is patchy opacity in the right lung base. There is linear scarring/atelectasis in the left lung base. 1. Lines and tubes in expected position. 2. Bibasilar pulmonary opacities are again noted. XR CHEST PORTABLE    Result Date: 11/18/2022  EXAMINATION: ONE XRAY VIEW OF THE CHEST 11/18/2022 11:58 am COMPARISON: 09/27/2022 HISTORY: ORDERING SYSTEM PROVIDED HISTORY: sob TECHNOLOGIST PROVIDED HISTORY: sob Reason for Exam: sob FINDINGS: Stable cardiomegaly. Bibasilar pulmonary opacities noted. No pulmonary vascular congestion or edema. No pneumothorax.      Bibasilar pulmonary opacities suggesting pneumonia         Physical Examination: Physical Exam  Constitutional:       Appearance: He is ill-appearing. HENT:      Head: Normocephalic and atraumatic. Nose: No congestion or rhinorrhea. Eyes:      Extraocular Movements: Extraocular movements intact. Conjunctiva/sclera: Conjunctivae normal.      Pupils: Pupils are equal, round, and reactive to light. Cardiovascular:      Rate and Rhythm: Normal rate and regular rhythm. Pulses: Normal pulses. Heart sounds: Normal heart sounds. No murmur heard. No friction rub. No gallop. Pulmonary:      Effort: Pulmonary effort is normal. No respiratory distress. Breath sounds: Rhonchi present. No wheezing or rales. Abdominal:      General: Abdomen is flat. Bowel sounds are normal. There is no distension. Tenderness: There is no abdominal tenderness. There is no guarding. Musculoskeletal:      Right lower leg: No edema. Left lower leg: No edema. Skin:     Capillary Refill: Capillary refill takes less than 2 seconds. Coloration: Skin is not jaundiced or pale. Assessment:        Primary Problem  Acute respiratory failure with hypoxia and hypercapnia [facility])    Active Hospital Problems    Diagnosis Date Noted    Acute respiratory failure with hypoxia and hypercapnia ([facility]) [J96.01, J96.02] 11/18/2022     Priority: Medium    Aspiration pneumonia, unspecified aspiration pneumonia type, unspecified laterality, unspecified part of lung ([facility] [address] [J69.0] 11/18/2022     Priority: Medium       Plan:        Acute hypoxic hypercapnic respiratory failure 2/2 to bilateral aspiration pneumonia   -Patient was hypoxic on presentation (02 sat in 70s); currently intubated on ventilator; currently saturating 100%; Fi02 40.    -CXR: bibasilar pulmonary opacities suggesting pneumonia  -CXR today: persistent bibasilar pulmonary opacities could represent atelectasis vs. pneumonia  -CT scan of chest once medically stable  -WBCS elevated at 16.7; today 17.9  -ABG on admission: pH 7.405, pC02 61.4, p02 81.2, HC03 38.4  -ABGs today: pH 7.418, pC02 51.0, p02 97.4, HC03 32.9  -Cefepime and Clindamycin given in ED; continue IV Cefepime and Clindamycin   -Blood cultures: no growth at 12 hours   -Respiratory culture pending  -Pro-calcitonin 0.27  -Lactate sepsis pending   -Aspiration precautions  -Pulmonology/critical care on board      Acute on Chronic kidney disease  -Creatinine on admission 2.54 (baseline 1.31); Cr today 2.48 (trending down)   -Urine Creatinine 40.6, random urine sodium 29  -Renal ultrasound: benign bilateral renal cysts.     -Nephrology consulted       DM2  -Glucose on admission: 186; today 284  -start medium dose sliding scale; start 10 U lantus  -POCT x4  -initiate hypoglycemic protocol  -Continue Gabapentin 200 mg nightly      Hyponatremia  -Na on admission 132; today 134  -Continue to monitor   -Nephrology consulted      History of embolic stroke  -Continue Warfarin 10 mg po daily (unclear if Warfarin is for severe peripheral artery disease or for possible history of atrial fibrillation)  -Pharmacy to dose warfarin   -monitor H + H   -Telemetry continuous monitoring     Elevated troponin   -Troponin on admission 160; 263 9/28  -EKG: sinus tachycardia with 1st degree AV block, anterior lateral infarct age undetermined   -Heparin on hold      CHF (EF (10-15%)  -EKG: sinus tachycardia with 1st degree AV block, left anterior fascicular block, and anterolateral infarct (age undetermined)  - Echo 9/28/22: EF < 20; RV function appears impaired; AV is sclerotic  -Hold Bumex  -Hold Coreg 6.25 mg BID  -Hold Lasix 40 mg daily  -Hold Entresto BID  -Hold Aldactone 25 mg daily       Depression/anxiety  -Continue Zoloft 50 mg daily     DVT prophylaxis: Hold home Warfarin 10 mg po daily; Hold Heparin   Code: Full code  Diet: NPO  PT/OT/SW consulted    Thompson Swanson MD  11/19/2022  10:56 AM   Attending Physician Statement  I have discussed the care of Lo Donahue and I have examined the patient myselft and taken ros and hpi , including pertinent history and exam findings,  with the resident. I have reviewed the key elements of all parts of the encounter with the resident. I agree with the assessment, plan and orders as documented by the resident.   Patient, clinically doing better  Levophed dose going down  No more bleeding through NG  Slight drop in hemoglobin may be dilutional  Will start patient on heparin drip with history of peripheral artery disease and questionable A. fib  Will monitor H&H Q8  Noticed that plan to start diet through PEG tube  On broad-spectrum antibiotic  Culture negative so far  Overall prognosis, very guarded given EF of 10%, advanced kidney disease, severe peripheral artery disease required bilateral below-knee amputation  Will reduce fluid to 50 mill per hour with advance heart disease and improving kidney function  Patient, critically sick, seen in ICU,  CC time 35-minute  Electronically signed by Woo Porter MD

## 2022-11-19 NOTE — PROGRESS NOTES
Dr Jacques Poster at bedside to see patient, holding off on heparin gtt coffee ground emesis per OG.  Re evaluate tomorrow 11/19

## 2022-11-19 NOTE — PROGRESS NOTES
11/19/22 1031   Encounter Summary   Encounter Overview/Reason  Initial Encounter   Service Provided For: Patient   Referral/Consult From: Rounding   Complexity of Encounter Low   Spiritual/Emotional needs   Type Spiritual Support   Assessment/Intervention/Outcome   Assessment Unable to assess   Intervention Prayer (assurance of)/[place]

## 2022-11-19 NOTE — PLAN OF CARE
Problem: Discharge Planning  Goal: Discharge to home or other facility with appropriate resources  11/19/2022 1741 by Lisbeth Bowden RN  Outcome: Progressing  11/19/2022 0807 by Quinton Thurston RN  Outcome: Progressing     Problem: Chronic Conditions and Co-morbidities  Goal: Patient's chronic conditions and co-morbidity symptoms are monitored and maintained or improved  11/19/2022 1741 by Lisbeth Bowden RN  Outcome: Progressing  11/19/2022 0807 by Quinton Thurston RN  Outcome: Progressing     Problem: Safety - Medical Restraint  Goal: Remains free of injury from restraints (Restraint for Interference with Medical Device)  Description: INTERVENTIONS:  1. Determine that other, less restrictive measures have been tried or would not be effective before applying the restraint  2. Evaluate the patient's condition at the time of restraint application  3. Inform patient/family regarding the reason for restraint  4. Q2H: Monitor safety, psychosocial status, comfort, nutrition and hydration  11/19/2022 1741 by Lisbeth Bowden RN  Outcome: Progressing  11/19/2022 0807 by Quinton Thurston RN  Outcome: Progressing     Problem: Pain  Goal: Verbalizes/displays adequate comfort level or baseline comfort level  11/19/2022 1741 by Lisbeth Bowden RN  Outcome: Progressing  11/19/2022 0807 by Quinton Thurston RN  Outcome: Progressing     Problem: Skin/Tissue Integrity  Goal: Absence of new skin breakdown  Description: 1. Monitor for areas of redness and/or skin breakdown  2. Assess vascular access sites hourly  3. Every 4-6 hours minimum:  Change oxygen saturation probe site  4. Every 4-6 hours:  If on nasal continuous positive airway pressure, respiratory therapy assess nares and determine need for appliance change or resting period.   Outcome: Progressing     Problem: Safety - Adult  Goal: Free from fall injury  Outcome: Progressing     Problem: ABCDS Injury Assessment  Goal: Absence of physical injury  Outcome: Progressing

## 2022-11-19 NOTE — CONSULTS
disease, unspecified     Chronic systolic (congestive) heart failure (HCC)     Depression     Heart failure (HCC)     Hypertension     Muscle weakness (generalized)     Other symbolic dysfunctions     Peripheral vascular disease (HCC)     Type 2 diabetes mellitus (HCC)        Past Surgical History:        Procedure Laterality Date    LEG AMPUTATION BELOW KNEE Bilateral        Current Medications:    insulin lispro (HUMALOG) injection vial 0-4 Units, Nightly  insulin glargine (LANTUS) injection vial 10 Units, BID  insulin lispro (HUMALOG) injection vial 0-16 Units, Q4H  sodium chloride flush 0.9 % injection 5-40 mL, 2 times per day  sodium chloride flush 0.9 % injection 5-40 mL, PRN  0.9 % sodium chloride infusion, PRN  ondansetron (ZOFRAN-ODT) disintegrating tablet 4 mg, Q8H PRN   Or  ondansetron (ZOFRAN) injection 4 mg, Q6H PRN  polyethylene glycol (GLYCOLAX) packet 17 g, Daily PRN  acetaminophen (TYLENOL) tablet 650 mg, Q6H PRN   Or  acetaminophen (TYLENOL) suppository 650 mg, Q6H PRN  potassium chloride 10 mEq/100 mL IVPB (Peripheral Line), PRN  cefepime (MAXIPIME) 1,000 mg in sodium chloride 0.9 % 50 mL IVPB (mini-bag), Q12H  glucose chewable tablet 16 g, PRN  dextrose bolus 10% 125 mL, PRN   Or  dextrose bolus 10% 250 mL, PRN  glucagon (rDNA) injection 1 mg, PRN  dextrose 10 % infusion, Continuous PRN  [Held by provider] bumetanide (BUMEX) tablet 1 mg, Daily  [Held by provider] carvedilol (COREG) tablet 6.25 mg, BID  [Held by provider] furosemide (LASIX) tablet 40 mg, Daily  [Held by provider] gabapentin (NEURONTIN) capsule 200 mg, Nightly  HYDROcodone-acetaminophen (NORCO) 5-325 MG per tablet 1 tablet, Q6H PRN  [Held by provider] sacubitril-valsartan (ENTRESTO) 24-26 MG per tablet 1 tablet, BID  [Held by provider] spironolactone (ALDACTONE) tablet 25 mg, Daily  sertraline (ZOLOFT) tablet 50 mg, Daily  0.9 % sodium chloride infusion, Continuous  albumin human 25 % IV solution 25 g, Q8H  vasopressin 20 Units in dextrose 5 % 100 mL infusion, Continuous  clindamycin (CLEOCIN) 600 mg in dextrose 5 % 50 mL IVPB, Q8H  norepinephrine (LEVOPHED) 16 mg in dextrose 5 % 250 mL infusion, Continuous  propofol injection, Continuous  polyvinyl alcohol (LIQUIFILM TEARS) 1.4 % ophthalmic solution 1 drop, Q4H   And  lubrifresh P.M. (artificial tears) ophthalmic ointment, Q4H  chlorhexidine (PERIDEX) 0.12 % solution 15 mL, BID  fentaNYL (SUBLIMAZE) injection 50 mcg, Q30 Min PRN  heparin (porcine) injection 4,000 Units, Once  heparin (porcine) injection 4,000 Units, PRN  heparin (porcine) injection 2,000 Units, PRN  heparin 25,000 units in dextrose 5% 250 mL (premix) infusion, Continuous  pantoprazole (PROTONIX) 40 mg in sodium chloride (PF) 0.9 % 10 mL injection, Q12H        Allergies:  Penicillins    Social History:   Social History     Socioeconomic History    Marital status: Single     Spouse name: Not on file    Number of children: Not on file    Years of education: Not on file    Highest education level: Not on file   Occupational History    Not on file   Tobacco Use    Smoking status: Unknown    Smokeless tobacco: Not on file   Substance and Sexual Activity    Alcohol use: Not on file    Drug use: Not on file    Sexual activity: Not on file   Other Topics Concern    Not on file   Social History Narrative    Not on file     Social Determinants of Health     Financial Resource Strain: Not on file   Food Insecurity: Not on file   Transportation Needs: Not on file   Physical Activity: Not on file   Stress: Not on file   Social Connections: Not on file   Intimate Partner Violence: Not on file   Housing Stability: Not on file       Family History:   History reviewed. No pertinent family history.     Review of Systems:    Unobtainable      Objective:  CURRENT TEMPERATURE:  Temp: 98.2 °F (36.8 °C)  MAXIMUM TEMPERATURE OVER 24HRS:  Temp (24hrs), Av.3 °F (36.8 °C), Min:97.7 °F (36.5 °C), Max:99.2 °F (37.3 °C)    CURRENT RESPIRATORY RATE: Resp: 17  CURRENT PULSE:  Heart Rate: 89  CURRENT BLOOD PRESSURE:  BP: (!) 90/51  24HR BLOOD PRESSURE RANGE:  Systolic (73GHJ), JPY:01 , Min:53 , YKC:359   ; Diastolic (16KDY), DALIA:81, Min:28, Max:98    24HR INTAKE/OUTPUT:    Intake/Output Summary (Last 24 hours) at 11/19/2022 1425  Last data filed at 11/19/2022 0530  Gross per 24 hour   Intake 1320.77 ml   Output 1200 ml   Net 120.77 ml     Patient Vitals for the past 96 hrs (Last 3 readings):   Weight   11/18/22 1515 150 lb 5.7 oz (68.2 kg)   11/18/22 1202 155 lb (70.3 kg)       Physical Exam:  GENERAL APPEARANCE: Alert and cooperative, and appears to be in no acute distress. HEAD: normocephalic  EYES:  EOMI. Not pale, anicteric   NOSE:  No nasal discharge. THROAT:  Oral cavity and pharynx normal. Moist  CARDIAC: Normal S1 and S2. No S3, S4 or murmurs. Rhythm is regular. LUNGS:diminished breath sounds. NECK: Trachea midline    MUSKULOSKELETAL: No swelling of the joints  EXTREMITIES: No edema. Bilateral BKA  NEURO: Nonfocal      Labs:   CBC:  Recent Labs     11/18/22  1254 11/18/22  1945 11/19/22  0557   WBC 16.7* 27.8* 17.9*   RBC 3.77* 3.45* 2.74*   HGB 11.3* 10.3* 8.4*   HCT 35.3* 32.6* 25.6*   MCV 93.7 94.3 93.2   MCH 30.1 29.9 30.7   MCHC 32.1 31.7 32.9   RDW 16.7* 16.7* 16.6*    255 183   MPV 9.8 10.1 10.1      BMP:   Recent Labs     11/18/22  1140 11/18/22  1225 11/19/22  0557   NA PLEASE DISREGARD RESULTS. SPECIMEN CONTAMINATED. 132* 134*   K PLEASE DISREGARD RESULTS. SPECIMEN CONTAMINATED. 4.9 4.8   CL PLEASE DISREGARD RESULTS. SPECIMEN CONTAMINATED. 86* 90*   CO2 PLEASE DISREGARD RESULTS. SPECIMEN CONTAMINATED. 36* 32*   BUN PLEASE DISREGARD RESULTS. SPECIMEN CONTAMINATED. 139* 140*   CREATININE PLEASE DISREGARD RESULTS. SPECIMEN CONTAMINATED. 2.54* 2.48*   GLUCOSE PLEASE DISREGARD RESULTS. SPECIMEN CONTAMINATED. 186* 309*   CALCIUM PLEASE DISREGARD RESULTS. SPECIMEN CONTAMINATED.  9.6 8.8      Phosphorus:  No results for input(s): PHOS in the last 72 hours. Magnesium:   Recent Labs     11/18/22  1140 11/18/22  1225   MG PLEASE DISREGARD RESULTS. SPECIMEN CONTAMINATED. 4.1*     Albumin:   Recent Labs     11/18/22  1140 11/18/22  1225   LABALBU PLEASE DISREGARD RESULTS. SPECIMEN CONTAMINATED. 3.6       IRON:  No results for input(s): IRON in the last 72 hours. Iron Saturation:  Invalid input(s): PERCENTFE  TIBC:  No results for input(s): TIBC in the last 72 hours. FERRITIN:  No results for input(s): FERRITIN in the last 72 hours. SPEP: No results for input(s): SPEP in the last 72 hours. Recent Labs     11/18/22  1225   PROT 7.6     UPEP: No results for input(s): TPU in the last 72 hours. Urine Sodium:    Recent Labs     11/18/22  1650   VÍCTOR 29      Urine Potassium: No results for input(s): KUR in the last 72 hours. Urine Chloride:  No results for input(s): CLU in the last 72 hours. Urine Ph:  Invalid input(s): PO4U  Urine Osmolarity: No results for input(s): OSMOU in the last 72 hours. Urine Creatinine:    Recent Labs     11/18/22  1650   LABCREA 40.6     Urine Eosinophils: Invalid input(s): EOSU  Urine Protein:  No results for input(s): TPU in the last 72 hours. Urinalysis:    Recent Labs     11/18/22  1650   NITRU NEGATIVE   COLORU Yellow   PHUR 5.0   WBCUA 3 to 5   RBCUA 0 TO 2   BACTERIA FEW*   SPECGRAV 1.015   LEUKOCYTESUR NEGATIVE   UROBILINOGEN Normal   BILIRUBINUR NEGATIVE   GLUCOSEU NEGATIVE   [address] NEGATIVE   AMORPHOUS 1+*         Radiology:  Chest x-ray persistent bibasilar pulmonary opacities could represent atelectasis or pneumonia    Assessment:    Acute kidney injury on CKD, nonoliguric most likely secondary to prerenal azotemia due to hypotension, other causes includes use of diuretics and Entresto, Aldactone which are on hold  Serum creatinine peaked to 2.5 mg/dL from 1.2 mg/dL   3. Hypotension on Levophed  3. Acute hypoxic respiratory failure most likely secondary to aspiration pneumonia  4.   History of CHF with reduced EF EF of 15 to 51% systolic dysfunction  5. Type 2 diabetes  5. Bilateral BKA       Plan:  Continue IV fluids, watch for volume overload IV fluids decreased to 50 and mL per hour  Patient is on Levophed  Patient has a Harris catheter, urine output noted    Discussed with the brother Geovanny Beltran who is power of , he does not want to pursue dialysis. Continue medical management          Thank you for the consultation.       Electronically signed by Liz Pickard MD on 11/19/2022 at 2:25 PM

## 2022-11-20 ENCOUNTER — APPOINTMENT (OUTPATIENT)
Dept: GENERAL RADIOLOGY | Age: 67
DRG: 871 | End: 2022-11-20
Payer: MEDICARE

## 2022-11-20 PROBLEM — K62.5 RECTAL BLEEDING: Status: ACTIVE | Noted: 2022-01-01

## 2022-11-20 PROBLEM — K92.0 COFFEE GROUND EMESIS: Status: ACTIVE | Noted: 2022-01-01

## 2022-11-20 LAB
ABSOLUTE BANDS #: 0.14 K/UL (ref 0–1)
ABSOLUTE EOS #: 0 K/UL (ref 0–0.4)
ABSOLUTE LYMPH #: 0.41 K/UL (ref 1–4.8)
ABSOLUTE MONO #: 1.23 K/UL (ref 0.1–1.3)
ALLEN TEST: ABNORMAL
ANION GAP SERPL CALCULATED.3IONS-SCNC: 13 MMOL/L (ref 9–17)
ANTI-XA UNFRAC HEPARIN: 0.29 IU/L (ref 0.3–0.7)
ANTI-XA UNFRAC HEPARIN: 0.43 IU/L (ref 0.3–0.7)
ANTI-XA UNFRAC HEPARIN: <0.1 IU/L (ref 0.3–0.7)
ANTI-XA UNFRAC HEPARIN: <0.1 IU/L (ref 0.3–0.7)
BANDS: 1 % (ref 0–10)
BASOPHILS # BLD: 0 % (ref 0–2)
BASOPHILS ABSOLUTE: 0 K/UL (ref 0–0.2)
BUN BLDV-MCNC: 116 MG/DL (ref 8–23)
CALCIUM SERPL-MCNC: 8.7 MG/DL (ref 8.6–10.4)
CARBOXYHEMOGLOBIN: 1.1 % (ref 0–5)
CHLORIDE BLD-SCNC: 98 MMOL/L (ref 98–107)
CO2: 30 MMOL/L (ref 20–31)
CREAT SERPL-MCNC: 2.05 MG/DL (ref 0.7–1.2)
CULTURE: ABNORMAL
DIRECT EXAM: ABNORMAL
EOSINOPHILS RELATIVE PERCENT: 0 % (ref 0–4)
ESTIMATED AVERAGE GLUCOSE: 117 MG/DL
FIO2: ABNORMAL
GFR SERPL CREATININE-BSD FRML MDRD: 35 ML/MIN/1.73M2
GLUCOSE BLD-MCNC: 165 MG/DL (ref 75–110)
GLUCOSE BLD-MCNC: 168 MG/DL (ref 75–110)
GLUCOSE BLD-MCNC: 181 MG/DL (ref 75–110)
GLUCOSE BLD-MCNC: 190 MG/DL (ref 75–110)
GLUCOSE BLD-MCNC: 201 MG/DL (ref 75–110)
GLUCOSE BLD-MCNC: 214 MG/DL (ref 75–110)
GLUCOSE BLD-MCNC: 241 MG/DL (ref 70–99)
HBA1C MFR BLD: 5.7 % (ref 4–6)
HCO3 ARTERIAL: 31.7 MMOL/L (ref 22–26)
HCT VFR BLD CALC: 23.4 % (ref 41–53)
HCT VFR BLD CALC: 23.5 % (ref 41–53)
HCT VFR BLD CALC: 24 % (ref 41–53)
HEMOGLOBIN: 7.5 G/DL (ref 13.5–17.5)
HEMOGLOBIN: 7.6 G/DL (ref 13.5–17.5)
HEMOGLOBIN: 7.8 G/DL (ref 13.5–17.5)
LYMPHOCYTES # BLD: 3 % (ref 24–44)
MCH RBC QN AUTO: 30.5 PG (ref 26–34)
MCHC RBC AUTO-ENTMCNC: 32.3 G/DL (ref 31–37)
MCV RBC AUTO: 94.3 FL (ref 80–100)
METHEMOGLOBIN: 0.5 % (ref 0–1.9)
MODE: ABNORMAL
MONOCYTES # BLD: 9 % (ref 1–7)
MORPHOLOGY: ABNORMAL
O2 DEVICE/FLOW/%: ABNORMAL
O2 SAT, ARTERIAL: 97.7 % (ref 95–98)
PATIENT TEMP: 37
PCO2 ARTERIAL: 49.7 MMHG (ref 35–45)
PDW BLD-RTO: 16.5 % (ref 11.5–14.9)
PEEP/CPAP: 8
PH ARTERIAL: 7.41 (ref 7.35–7.45)
PLATELET # BLD: 147 K/UL (ref 150–450)
PMV BLD AUTO: 9.9 FL (ref 6–12)
PO2 ARTERIAL: 110 MMHG (ref 80–100)
POSITIVE BASE EXCESS, ART: 7.1 MMOL/L (ref 0–2)
POTASSIUM SERPL-SCNC: 3.8 MMOL/L (ref 3.7–5.3)
PT. POSITION: ABNORMAL
RBC # BLD: 2.49 M/UL (ref 4.5–5.9)
RESPIRATORY RATE: 23
SAMPLE SITE: ABNORMAL
SEG NEUTROPHILS: 87 % (ref 36–66)
SEGMENTED NEUTROPHILS ABSOLUTE COUNT: 11.92 K/UL (ref 1.3–9.1)
SET RATE: 20
SODIUM BLD-SCNC: 141 MMOL/L (ref 135–144)
SPECIMEN DESCRIPTION: ABNORMAL
TEXT FOR RESPIRATORY: ABNORMAL
TOTAL RATE: 23
VT: 500
WBC # BLD: 13.7 K/UL (ref 3.5–11)

## 2022-11-20 PROCEDURE — 2580000003 HC RX 258: Performed by: INTERNAL MEDICINE

## 2022-11-20 PROCEDURE — 85025 COMPLETE CBC W/AUTO DIFF WBC: CPT

## 2022-11-20 PROCEDURE — 2500000003 HC RX 250 WO HCPCS: Performed by: INTERNAL MEDICINE

## 2022-11-20 PROCEDURE — 6360000002 HC RX W HCPCS: Performed by: INTERNAL MEDICINE

## 2022-11-20 PROCEDURE — 82805 BLOOD GASES W/O2 SATURATION: CPT

## 2022-11-20 PROCEDURE — 85520 HEPARIN ASSAY: CPT

## 2022-11-20 PROCEDURE — 2700000000 HC OXYGEN THERAPY PER DAY

## 2022-11-20 PROCEDURE — 85018 HEMOGLOBIN: CPT

## 2022-11-20 PROCEDURE — 2000000000 HC ICU R&B

## 2022-11-20 PROCEDURE — C9113 INJ PANTOPRAZOLE SODIUM, VIA: HCPCS | Performed by: INTERNAL MEDICINE

## 2022-11-20 PROCEDURE — 6370000000 HC RX 637 (ALT 250 FOR IP): Performed by: STUDENT IN AN ORGANIZED HEALTH CARE EDUCATION/TRAINING PROGRAM

## 2022-11-20 PROCEDURE — 2580000003 HC RX 258

## 2022-11-20 PROCEDURE — 99233 SBSQ HOSP IP/OBS HIGH 50: CPT | Performed by: INTERNAL MEDICINE

## 2022-11-20 PROCEDURE — 82947 ASSAY GLUCOSE BLOOD QUANT: CPT

## 2022-11-20 PROCEDURE — 6370000000 HC RX 637 (ALT 250 FOR IP): Performed by: INTERNAL MEDICINE

## 2022-11-20 PROCEDURE — A4216 STERILE WATER/SALINE, 10 ML: HCPCS | Performed by: INTERNAL MEDICINE

## 2022-11-20 PROCEDURE — 6370000000 HC RX 637 (ALT 250 FOR IP)

## 2022-11-20 PROCEDURE — 94003 VENT MGMT INPAT SUBQ DAY: CPT

## 2022-11-20 PROCEDURE — 36415 COLL VENOUS BLD VENIPUNCTURE: CPT

## 2022-11-20 PROCEDURE — 99223 1ST HOSP IP/OBS HIGH 75: CPT | Performed by: INTERNAL MEDICINE

## 2022-11-20 PROCEDURE — 6360000002 HC RX W HCPCS

## 2022-11-20 PROCEDURE — 85014 HEMATOCRIT: CPT

## 2022-11-20 PROCEDURE — 86022 PLATELET ANTIBODIES: CPT

## 2022-11-20 PROCEDURE — 80048 BASIC METABOLIC PNL TOTAL CA: CPT

## 2022-11-20 PROCEDURE — P9047 ALBUMIN (HUMAN), 25%, 50ML: HCPCS | Performed by: INTERNAL MEDICINE

## 2022-11-20 PROCEDURE — 94761 N-INVAS EAR/PLS OXIMETRY MLT: CPT

## 2022-11-20 PROCEDURE — 71045 X-RAY EXAM CHEST 1 VIEW: CPT

## 2022-11-20 RX ORDER — INSULIN GLARGINE 100 [IU]/ML
10 INJECTION, SOLUTION SUBCUTANEOUS 2 TIMES DAILY
Status: DISCONTINUED | OUTPATIENT
Start: 2022-11-20 | End: 2022-11-24

## 2022-11-20 RX ORDER — INSULIN GLARGINE 100 [IU]/ML
16 INJECTION, SOLUTION SUBCUTANEOUS 2 TIMES DAILY
Status: DISCONTINUED | OUTPATIENT
Start: 2022-11-20 | End: 2022-11-20

## 2022-11-20 RX ADMIN — CEFEPIME 1000 MG: 1 INJECTION, POWDER, FOR SOLUTION INTRAMUSCULAR; INTRAVENOUS at 12:21

## 2022-11-20 RX ADMIN — SODIUM CHLORIDE, PRESERVATIVE FREE 10 ML: 5 INJECTION INTRAVENOUS at 08:48

## 2022-11-20 RX ADMIN — PROPOFOL 10 MCG/KG/MIN: 10 INJECTION, EMULSION INTRAVENOUS at 00:51

## 2022-11-20 RX ADMIN — ALBUMIN (HUMAN) 25 G: 0.25 INJECTION, SOLUTION INTRAVENOUS at 08:36

## 2022-11-20 RX ADMIN — MINERAL OIL, PETROLATUM: 425; 568 OINTMENT OPHTHALMIC at 04:37

## 2022-11-20 RX ADMIN — DEXTROSE MONOHYDRATE 4 MCG/MIN: 50 INJECTION, SOLUTION INTRAVENOUS at 08:36

## 2022-11-20 RX ADMIN — ALBUMIN (HUMAN) 25 G: 0.25 INJECTION, SOLUTION INTRAVENOUS at 17:36

## 2022-11-20 RX ADMIN — SODIUM CHLORIDE, PRESERVATIVE FREE 10 ML: 5 INJECTION INTRAVENOUS at 20:04

## 2022-11-20 RX ADMIN — POLYVINYL ALCOHOL 1 DROP: 14 SOLUTION/ DROPS OPHTHALMIC at 08:49

## 2022-11-20 RX ADMIN — CLINDAMYCIN IN 5 PERCENT DEXTROSE 600 MG: 12 INJECTION, SOLUTION INTRAVENOUS at 05:51

## 2022-11-20 RX ADMIN — CHLORHEXIDINE GLUCONATE 0.12% ORAL RINSE 15 ML: 1.2 LIQUID ORAL at 08:38

## 2022-11-20 RX ADMIN — MINERAL OIL, PETROLATUM: 425; 568 OINTMENT OPHTHALMIC at 00:52

## 2022-11-20 RX ADMIN — SODIUM CHLORIDE, PRESERVATIVE FREE 40 MG: 5 INJECTION INTRAVENOUS at 20:05

## 2022-11-20 RX ADMIN — CLINDAMYCIN IN 5 PERCENT DEXTROSE 600 MG: 12 INJECTION, SOLUTION INTRAVENOUS at 20:50

## 2022-11-20 RX ADMIN — MINERAL OIL, PETROLATUM: 425; 568 OINTMENT OPHTHALMIC at 08:49

## 2022-11-20 RX ADMIN — CLINDAMYCIN IN 5 PERCENT DEXTROSE 600 MG: 12 INJECTION, SOLUTION INTRAVENOUS at 14:38

## 2022-11-20 RX ADMIN — INSULIN LISPRO 4 UNITS: 100 INJECTION, SOLUTION INTRAVENOUS; SUBCUTANEOUS at 00:52

## 2022-11-20 RX ADMIN — HEPARIN SODIUM 2000 UNITS: 1000 INJECTION INTRAVENOUS; SUBCUTANEOUS at 03:10

## 2022-11-20 RX ADMIN — INSULIN LISPRO 4 UNITS: 100 INJECTION, SOLUTION INTRAVENOUS; SUBCUTANEOUS at 04:37

## 2022-11-20 RX ADMIN — INSULIN GLARGINE 10 UNITS: 100 INJECTION, SOLUTION SUBCUTANEOUS at 08:30

## 2022-11-20 RX ADMIN — DEXTROSE MONOHYDRATE 2 MCG/MIN: 50 INJECTION, SOLUTION INTRAVENOUS at 09:20

## 2022-11-20 RX ADMIN — CEFEPIME 1000 MG: 1 INJECTION, POWDER, FOR SOLUTION INTRAMUSCULAR; INTRAVENOUS at 22:48

## 2022-11-20 RX ADMIN — SODIUM CHLORIDE, PRESERVATIVE FREE 40 MG: 5 INJECTION INTRAVENOUS at 08:38

## 2022-11-20 RX ADMIN — HEPARIN SODIUM 18 UNITS/KG/HR: 10000 INJECTION, SOLUTION INTRAVENOUS at 03:13

## 2022-11-20 RX ADMIN — SERTRALINE HYDROCHLORIDE 50 MG: 50 TABLET ORAL at 08:38

## 2022-11-20 RX ADMIN — POLYVINYL ALCOHOL 1 DROP: 14 SOLUTION/ DROPS OPHTHALMIC at 02:15

## 2022-11-20 RX ADMIN — ALBUMIN (HUMAN) 25 G: 0.25 INJECTION, SOLUTION INTRAVENOUS at 00:52

## 2022-11-20 RX ADMIN — ALBUMIN (HUMAN) 25 G: 0.25 INJECTION, SOLUTION INTRAVENOUS at 23:39

## 2022-11-20 ASSESSMENT — PULMONARY FUNCTION TESTS
PIF_VALUE: 22
PIF_VALUE: 21
PIF_VALUE: 23
PIF_VALUE: 16
PIF_VALUE: 23
PIF_VALUE: 23
PIF_VALUE: 25
PIF_VALUE: 22
PIF_VALUE: 23
PIF_VALUE: 23
PIF_VALUE: 21
PIF_VALUE: 23
PIF_VALUE: 20
PIF_VALUE: 22
PIF_VALUE: 21
PIF_VALUE: 22
PIF_VALUE: 21
PIF_VALUE: 21
PIF_VALUE: 22
PIF_VALUE: 22
PIF_VALUE: 21
PIF_VALUE: 18
PIF_VALUE: 22
PIF_VALUE: 18
PIF_VALUE: 22
PIF_VALUE: 18
PIF_VALUE: 21
PIF_VALUE: 22
PIF_VALUE: 18
PIF_VALUE: 22
PIF_VALUE: 21
PIF_VALUE: 19
PIF_VALUE: 18
PIF_VALUE: 21

## 2022-11-20 ASSESSMENT — PAIN SCALES - GENERAL
PAINLEVEL_OUTOF10: 0

## 2022-11-20 NOTE — FLOWSHEET NOTE
11/20/22 7:06 AM  [phone] Hospital or Facility: [facility] [place], [place] From: Piotr Bartholomew RE: Charles Kelsey 1955 RM: 2009-01 New consult for blood in stool with halina size clots and HgB 7.6 this morning prior HgB 8 hours prior was 8.1 and the one prior to that was 8.4. Stopped Heparin gtt. and tube feeding. Checking HgB Q 6 hours now. Patient admitted for Aspiration Pneumonia, Intubated and on Levophed and getting Albumin Q 8 hours.  Need Callback: NEEDS CALLBACK RADIOLOGY ROUTINE  Read 7:06 AM

## 2022-11-20 NOTE — PROGRESS NOTES
Pt placed on 3L NC due to SpO2 of 88%. Dr. Michelle Wilson and notified about this and patients pleural rub lung sounds.

## 2022-11-20 NOTE — PROGRESS NOTES
ICU Progress Note (Vent)   [facility]    Patient - Anila Montgomery,  Age - 79 y.o.    - 1955      Room Number -    N -  [id number]   [facility]  Date of Admission -  2022 11:46 AM    Events of Past 24 Hours   He is much more alert, oriented understands what I am saying by: Still on norepinephrine    Vitals    height is 6' 3\" (1.905 m) and weight is 150 lb 5.7 oz (68.2 kg). His temperature is 98.6 °F (37 °C). His blood pressure is 110/64 and his pulse is 91. His respiration is 25 and oxygen saturation is 98%. Temperature Range: Temp: 98.6 °F (37 °C) Temp  Av.5 °F (36.9 °C)  Min: 98.2 °F (36.8 °C)  Max: 98.8 °F (37.1 °C)  BP Range:  Systolic (98SFG), SRW:152 , Min:84 , QSE:304     Diastolic (19VKL), WXU:55, Min:48, Max:74    Pulse Range: Pulse  Av.6  Min: 74  Max: 98  Respiration Range: Resp  Av.3  Min: 13  Max: 31  Current Pulse Ox[de-identified]  SpO2: 98 %  24HR Pulse Ox Range:  SpO2  Av.7 %  Min: 93 %  Max: 100 %  Oxygen Amount and Delivery: O2 Flow Rate (L/min): 8 L/min      Wt Readings from Last 3 Encounters:   22 150 lb 5.7 oz (68.2 kg)   22 156 lb 8.4 oz (71 kg)   22 150 lb (68 kg)     I/O     Intake/Output Summary (Last 24 hours) at 2022 0914  Last data filed at 2022 0600  Gross per 24 hour   Intake 2811.24 ml   Output 1475 ml   Net 1336.24 ml     I/O last 3 completed shifts:   In: 4344 [I.V.:2471.3; NG/GT:841; IV Piggyback:819.7]  Out: 2675 [Urine:2125; Emesis/NG output:550]     DRAIN/TUBE OUTPUT:     Invasive Lines   ETT Day -   3  Lines -right femoral vein 3    ICP PRESSURE RANGE:  No data recorded  CVP PRESSURE RANGE:  No data recorded  Mechanical Ventilation Data   SETTINGS (Comprehensive)  Vent Information  Ventilator Day(s): 2  Ventilator ID: [id number]  Equipment Changed: Airway securing device  Ventilator Initiate: Yes  Vent Mode: CPAP/PS  Additional Respiratory Assessments  Heart Rate: 91  Resp: 25  SpO2: 98 %  End Tidal CO2: 31 (%)  Humidification Source: HME  Circuit Condensation: Not drained       ABGs:   Lab Results   Component Value Date/Time    PHART 7.412 11/20/2022 04:22 AM    PO2ART 110.0 11/20/2022 04:22 AM    HKE6WWB 49.7 11/20/2022 04:22 AM       Lab Results   Component Value Date/Time    MODE PRVC 11/20/2022 04:22 AM         Medications   IV   sodium chloride      dextrose      sodium chloride 50 mL/hr at 11/20/22 0006    vasopressin (Septic Shock) infusion Stopped (11/19/22 1148)    norepinephrine (LEVOPHED) infusion 4 mcg/min (11/20/22 0836)    propofol 10 mcg/kg/min (11/20/22 0051)    heparin (PORCINE) Infusion 18 Units/kg/hr (11/20/22 0313)      insulin glargine  10 Units SubCUTAneous BID    insulin lispro  0-4 Units SubCUTAneous Nightly    insulin lispro  0-16 Units SubCUTAneous Q4H    sodium chloride flush  5-40 mL IntraVENous 2 times per day    cefepime  1,000 mg IntraVENous Q12H    [Held by provider] bumetanide  1 mg Per G Tube Daily    [Held by provider] carvedilol  6.25 mg Per G Tube BID    [Held by provider] furosemide  40 mg Per G Tube Daily    [Held by provider] gabapentin  200 mg Per G Tube Nightly    [Held by provider] sacubitril-valsartan  1 tablet Per G Tube BID    [Held by provider] spironolactone  25 mg Per G Tube Daily    sertraline  50 mg Per G Tube Daily    albumin human  25 g IntraVENous Q8H    clindamycin (CLEOCIN) IV  600 mg IntraVENous Q8H    polyvinyl alcohol  1 drop Both Eyes Q4H    And    artificial tears   Both Eyes Q4H    chlorhexidine  15 mL Mouth/Throat BID    heparin (porcine)  4,000 Units IntraVENous Once    pantoprazole (PROTONIX) 40 mg injection  40 mg IntraVENous Q12H       Diet/Nutrition   Diet NPO  ADULT TUBE FEEDING; PEG; Renal Formula; Continuous; 15; Yes; 15; Q 4 hours; 45; 100; Q 6 hours    Exam   VITALS    height is 6' 3\" (1.905 m) and weight is 150 lb 5.7 oz (68.2 kg). His temperature is 98.6 °F (37 °C).  His blood pressure is 110/64 and his pulse is 91. His respiration is 25 and oxygen saturation is 98%. Ventilator Settings (Basic)  Vent Mode: CPAP/PS Resp Rate (Set): 16 bmp/Vt (Set, mL): 500 mL/ /FiO2 : 30 %    Constitutional -alert on minimal sedation  General Appearance chronically ill  HEENT - Life support devices in place (ET, OG),normocephalic, atraumatic. PERRLA  Lungs - Chest expands equally, no wheezes, rales or rhonchi. Cardiovascular - Heart sounds are normal.  normal rate and rhythm regular, no murmur, gallop or rub. Abdomen - soft, nontender, nondistended, no masses or organomegaly  Neurologic - CN II-XII are grossly intact.  There are no focal motor deficits  Skin - no bruising or bleeding  Extremities - no cyanosis, clubbing or edema; status post bilateral BKA    Lab Results   CBC     Lab Results   Component Value Date/Time    WBC 13.7 11/20/2022 05:47 AM    RBC 2.49 11/20/2022 05:47 AM    HGB 7.6 11/20/2022 05:47 AM    HCT 23.5 11/20/2022 05:47 AM     11/20/2022 05:47 AM    MCV 94.3 11/20/2022 05:47 AM    MCH 30.5 11/20/2022 05:47 AM    MCHC 32.3 11/20/2022 05:47 AM    RDW 16.5 11/20/2022 05:47 AM    LYMPHOPCT 3 11/20/2022 05:47 AM    MONOPCT 9 11/20/2022 05:47 AM    BASOPCT 0 11/20/2022 05:47 AM    MONOSABS 1.23 11/20/2022 05:47 AM    LYMPHSABS 0.41 11/20/2022 05:47 AM    EOSABS 0.00 11/20/2022 05:47 AM    BASOSABS 0.00 11/20/2022 05:47 AM       BMP   Lab Results   Component Value Date/Time     11/20/2022 05:47 AM    K 3.8 11/20/2022 05:47 AM    CL 98 11/20/2022 05:47 AM    CO2 30 11/20/2022 05:47 AM     11/20/2022 05:47 AM    CREATININE 2.05 11/20/2022 05:47 AM    GLUCOSE 241 11/20/2022 05:47 AM    CALCIUM 8.7 11/20/2022 05:47 AM       LFTS  Lab Results   Component Value Date/Time    ALKPHOS 132 11/18/2022 12:25 PM    ALT 19 11/18/2022 12:25 PM    AST 18 11/18/2022 12:25 PM    PROT 7.6 11/18/2022 12:25 PM    BILITOT 0.3 11/18/2022 12:25 PM    LABALBU 3.6 11/18/2022 12:25 PM INR  Recent Labs     11/18/22  1145 11/18/22 1945   PROTIME 16.3* 18.2*   INR 1.3 1.5       APTT  Recent Labs     11/18/22  1145 11/18/22 1945   APTT 33.8 29.7       Lactic Acid  Lab Results   Component Value Date/Time    LACTA 0.6 11/18/2022 12:51 PM        BNP   No results for input(s): BNP in the last 72 hours. 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    Neuro   Mental status approaching baseline    Respiratory   Wean oxygen as tolerated.  Keep O2 sat > 88%  Continue weaning trial, hopefully can extubate today     Hemodynamics   Still on norepinephrine but blood pressure seems to have significantly improved, will attempt to wean off    Gastrointestinal/Nutrition   Had been tolerating tube feeds  Extubated could probably do swallow study    Renal   Creatinine actually has improved with gentle IV fluid hydration    Infectious Disease   Continue empiric treatment for aspiration pneumonia/healthcare acquired pneumonia with clindamycin and cefepime    Hematology/Oncology   Hemoglobin has dropped as well as platelet count  Will send heparin antibody, agree with monitoring hemoglobin and hematocrit  Transfuse if hemoglobin less than 7  Endocrine   Blood sugar still elevated but improved on Lantus twice a day    Social/Spiritual/DNR/Disposition/Other     Updated brother Bernabe Rucker and told him that he had improved    Critical Care Time   35 min    Electronically signed by Urban De Leon MD on 11/20/2022 at 9:14 AM

## 2022-11-20 NOTE — PLAN OF CARE
Problem: Discharge Planning  Goal: Discharge to home or other facility with appropriate resources  11/19/2022 2237 by Thee Mercado RN  Outcome: Progressing  Flowsheets (Taken 11/19/2022 2000)  Discharge to home or other facility with appropriate resources: Identify barriers to discharge with patient and caregiver  11/19/2022 1741 by Ladan Ferreira RN  Outcome: Progressing     Problem: Chronic Conditions and Co-morbidities  Goal: Patient's chronic conditions and co-morbidity symptoms are monitored and maintained or improved  11/19/2022 2237 by Thee Mercado RN  Outcome: Progressing  Flowsheets (Taken 11/19/2022 2000)  Care Plan - Patient's Chronic Conditions and Co-Morbidity Symptoms are Monitored and Maintained or Improved:   Monitor and assess patient's chronic conditions and comorbid symptoms for stability, deterioration, or improvement   Collaborate with multidisciplinary team to address chronic and comorbid conditions and prevent exacerbation or deterioration   Update acute care plan with appropriate goals if chronic or comorbid symptoms are exacerbated and prevent overall improvement and discharge  11/19/2022 1741 by Ladan Ferreira RN  Outcome: Progressing     Problem: Safety - Medical Restraint  Goal: Remains free of injury from restraints (Restraint for Interference with Medical Device)  Description: INTERVENTIONS:  1. Determine that other, less restrictive measures have been tried or would not be effective before applying the restraint  2. Evaluate the patient's condition at the time of restraint application  3. Inform patient/family regarding the reason for restraint  4.  Q2H: Monitor safety, psychosocial status, comfort, nutrition and hydration  11/19/2022 2237 by Thee Mercado RN  Outcome: Progressing  Flowsheets  Taken 11/19/2022 2200  Remains free of injury from restraints (restraint for interference with medical device):   Determine that other, less restrictive measures have been tried or would not be effective before applying the restraint   Evaluate the patient's condition at the time of restraint application   Every 2 hours: Monitor safety, psychosocial status, comfort, nutrition and hydration  Taken 11/19/2022 2000  Remains free of injury from restraints (restraint for interference with medical device):   Determine that other, less restrictive measures have been tried or would not be effective before applying the restraint   Evaluate the patient's condition at the time of restraint application   Every 2 hours: Monitor safety, psychosocial status, comfort, nutrition and hydration  11/19/2022 1741 by Lisbeth Bowden RN  Outcome: Progressing     Problem: Pain  Goal: Verbalizes/displays adequate comfort level or baseline comfort level  11/19/2022 2237 by Zay Piña RN  Outcome: Progressing  Flowsheets (Taken 11/19/2022 2000)  Verbalizes/displays adequate comfort level or baseline comfort level: Assess pain using appropriate pain scale  11/19/2022 1741 by Lisbeth Bowden RN  Outcome: Progressing     Problem: Skin/Tissue Integrity  Goal: Absence of new skin breakdown  Description: 1. Monitor for areas of redness and/or skin breakdown  2. Assess vascular access sites hourly  3. Every 4-6 hours minimum:  Change oxygen saturation probe site  4. Every 4-6 hours:  If on nasal continuous positive airway pressure, respiratory therapy assess nares and determine need for appliance change or resting period.   11/19/2022 2237 by Zay Piña RN  Outcome: Progressing  11/19/2022 1741 by Lisbeth Bowden RN  Outcome: Progressing     Problem: Safety - Adult  Goal: Free from fall injury  11/19/2022 2237 by Zay Piña RN  Outcome: Hickman Sprawls (Taken 11/19/2022 2000)  Free From Fall Injury: Instruct family/caregiver on patient safety  11/19/2022 1741 by Lisbeth Bowden RN  Outcome: Progressing     Problem: ABCDS Injury Assessment  Goal: Absence of physical injury  11/19/2022 2237 by Zay Piña RN  Outcome: Progressing  Flowsheets (Taken 11/19/2022 2000)  Absence of Physical Injury: Implement safety measures based on patient assessment  11/19/2022 1741 by Yazan Lawson RN  Outcome: Progressing

## 2022-11-20 NOTE — FLOWSHEET NOTE
Updated Dr. Alana Estevez regarding NG tube being clamped since [id number] [id number] and that the patient has tolerated her clear liquid diet, no reports of pain, nausea or vomiting. Per Dr. Alana Estevez pull NG tube and continue clear liquid diet.

## 2022-11-20 NOTE — CONSULTS
GI Consult Note:    Name: Michelle De Leon  MRN: [id number]     [place]: [de-identified]  Room: 2009/2009-01    Admit Date: 11/18/2022  PCP: Darlyn Davis MD    Physician Requesting Consult: Kath Tejeda MD     Reason for Consult:    Anemia  Coffee-ground in the OG tube  Passage of clots through the rectum  Weakness and fatigue    Chief Complaint:     Chief Complaint   Patient presents with    Shortness of Breath       History Obtained From:     Patient EMR and nursing staff in ICU    History of Present Illness:      Michelle De Leon is a  79 y.o.  male who presents with Shortness of Breath    This 49-year-old gentleman has been hospitalized at [facility] intensive care unit with breathing issues    He just got extubated this morning  Couple of days ago he was noticed to have some coffee-ground liquid in his OG tube    Hemoglobin drop was noted    According to nursing staff she noticed some clots passage through the rectum as well    He was also on heparin because of elevated troponin which has been stopped    Patient is currently stable    No overt bleeding noted although hemoglobin is low    He is very weak and lethargic just got extubated not able to talk very much short of breath    Denies any previous history for any ulcer disease or stomach issues    Has significant weakness and fatigue    Has some renal issues requiring transient hemodialysis    This charts and records are reviewed care was discussed with nursing staff in ICU    Symptoms:  Onset:  Location:  abdomen  Duration:  day(s)  Severity: Mild  Quality:  intermittent      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 ([address]     Acquired absence of right leg below knee ([address]     Acute kidney failure ([address]     Acute respiratory failure with hypoxia ([address]     Cerebral infarction due to embolism ([address]     Chronic kidney disease, unspecified     Chronic systolic (congestive) heart daily    Historical Provider, MD   polyethylene glycol (GLYCOLAX) 17 GM/SCOOP powder 17 g by Per G Tube route daily    Historical Provider, MD        Allergies:       Penicillins    Social History:     Tobacco:    has no history on file for tobacco use. Alcohol:      has no history on file for alcohol use. Drug Use:  has no history on file for drug use. Family History:     History reviewed. No pertinent family history.     Review of Systems:     Positive and Negative as described in HPI    Constitutional:  negative for  fevers, chills, sweats, extreme fatigue, and weight loss  HEENT:  negative for vision or hearing changes,   Respiratory: Positive shortness of breath, cough, or congestion  Cardiovascular:  negative for  chest pain, palpitations  Gastrointestinal: Positive nausea, vomiting, diarrhea, constipation, mild abdominal pain  Genitourinary:  negative for frequency, dysuria  Integument: Positive rash, skin lesions  Musculoskeletal: Positive muscle aches or joint pain  Neurological: Positive for headaches, dizziness, lightheadedness, numbness, pain and tingling extrimities  Behavior/Psych:  negative for depression and positive anxiety    Code Status:  Full Code    Physical Exam:     Vitals:  BP 95/64   Pulse (!) 102   Temp 98.2 °F (36.8 °C) (Oral)   Resp 20   Ht 6' 3\" (1.905 m)   Wt 150 lb 5.7 oz (68.2 kg)   SpO2 96%   BMI 18.79 kg/m²   Temp (24hrs), Av.5 °F (36.9 °C), Min:98.2 °F (36.8 °C), Max:98.8 °F (37.1 °C)      General appearance -weak lethargic sick appearing, and in no acute distress  Mental status -slow but oriented to person, place, and time with anxious affect  Head - normocephalic and atraumatic  Eyes - pupils equal and reactive, extraocular eye movements intact, conjunctiva pale  Ears - hearing appears to be intact  Nose - no drainage noted  Mouth - mucous membranes moist  Neck - supple, no carotid bruits, thyroid not palpable  Chest -chest got extubated   heart - normal rate, regular rhythm, no murmurs  Abdomen - soft, scars from insulin injections noted very mild tenderness, nondistended, bowel sounds present all four quadrants, no masses, hepatomegaly or splenomegaly.  No hernias  Neurological -he is slow and lethargic however is oriented   Extremities -bilateral amputation noted pedal edema or calf pain with palpation  Skin -positive gross lesions, rashes, or induration noted  Cranial Nerves : Not done at this time  Lymph nodes: not done at this time    Data:   CBC:   Lab Results   Component Value Date/Time    WBC 13.7 11/20/2022 05:47 AM    RBC 2.49 11/20/2022 05:47 AM    HGB 7.8 11/20/2022 12:01 PM    HCT 24.0 11/20/2022 12:01 PM    MCV 94.3 11/20/2022 05:47 AM    MCH 30.5 11/20/2022 05:47 AM    MCHC 32.3 11/20/2022 05:47 AM    RDW 16.5 11/20/2022 05:47 AM     11/20/2022 05:47 AM    MPV 9.9 11/20/2022 05:47 AM     CBC with Differential:    Lab Results   Component Value Date/Time    WBC 13.7 11/20/2022 05:47 AM    RBC 2.49 11/20/2022 05:47 AM    HGB 7.8 11/20/2022 12:01 PM    HCT 24.0 11/20/2022 12:01 PM     11/20/2022 05:47 AM    MCV 94.3 11/20/2022 05:47 AM    MCH 30.5 11/20/2022 05:47 AM    MCHC 32.3 11/20/2022 05:47 AM    RDW 16.5 11/20/2022 05:47 AM    LYMPHOPCT 3 11/20/2022 05:47 AM    MONOPCT 9 11/20/2022 05:47 AM    BASOPCT 0 11/20/2022 05:47 AM    MONOSABS 1.23 11/20/2022 05:47 AM    LYMPHSABS 0.41 11/20/2022 05:47 AM    EOSABS 0.00 11/20/2022 05:47 AM    BASOSABS 0.00 11/20/2022 05:47 AM     Hemoglobin/Hematocrit:    Lab Results   Component Value Date/Time    HGB 7.8 11/20/2022 12:01 PM    HCT 24.0 11/20/2022 12:01 PM     CMP:    Lab Results   Component Value Date/Time     11/20/2022 05:47 AM    K 3.8 11/20/2022 05:47 AM    CL 98 11/20/2022 05:47 AM    CO2 30 11/20/2022 05:47 AM     11/20/2022 05:47 AM    CREATININE 2.05 11/20/2022 05:47 AM    GFRAA 33 09/28/2022 04:04 AM    LABGLOM 35 11/20/2022 05:47 AM    GLUCOSE 241 11/20/2022 05:47 AM    PROT 7.6 11/18/2022 12:25 PM    LABALBU 3.6 11/18/2022 12:25 PM    CALCIUM 8.7 11/20/2022 05:47 AM    BILITOT 0.3 11/18/2022 12:25 PM    ALKPHOS 132 11/18/2022 12:25 PM    AST 18 11/18/2022 12:25 PM    ALT 19 11/18/2022 12:25 PM     BMP:    Lab Results   Component Value Date/Time     11/20/2022 05:47 AM    K 3.8 11/20/2022 05:47 AM    CL 98 11/20/2022 05:47 AM    CO2 30 11/20/2022 05:47 AM     11/20/2022 05:47 AM    LABALBU 3.6 11/18/2022 12:25 PM    CREATININE 2.05 11/20/2022 05:47 AM    CALCIUM 8.7 11/20/2022 05:47 AM    GFRAA 33 09/28/2022 04:04 AM    LABGLOM 35 11/20/2022 05:47 AM    GLUCOSE 241 11/20/2022 05:47 AM     PT/INR:    Lab Results   Component Value Date/Time    PROTIME 18.2 11/18/2022 07:45 PM    INR 1.5 11/18/2022 07:45 PM     PTT:    Lab Results   Component Value Date/Time    APTT 29.7 11/18/2022 07:45 PM   [APTT}    Assesment:     Primary Problem  Acute respiratory failure with hypoxia and hypercapnia ([facility]    Active Hospital Problems    Diagnosis Date Noted    Acute respiratory failure with hypoxia and hypercapnia (HCC) [J96.01, J96.02] 11/18/2022     Priority: Medium    Aspiration pneumonia, unspecified aspiration pneumonia type, unspecified laterality, unspecified part of lung ([address] [J69.0] 11/18/2022     Priority: Medium     Anemia  Coffee-ground in the OG tube  Passage of clots through the rectum  Weakness and fatigue  Plan:     Questionable stress ulcers he was intubated has been on blood thinners  Recommend IV PPI  Follow-up hemoglobin hematocrit transfuse if hemoglobin drops below 7  Depending on status may require EGD plus minus colonoscopy  Explained to the patient  Discussed with nursing staff in ICU  Will reevaluate in the morning  Call for any acute problems        Thank you for allowing me to participate in the care of your patient. Please feel free to contact me with any questions or concerns.      Electronically signed by Yesenia Linder MD on 11/20/2022 at 1:03 PM     Copy sent to Dr. Mary Osei MD

## 2022-11-20 NOTE — PROGRESS NOTES
[address]    PROGRESS NOTE             11/20/2022    9:45 AM    Name:   Christal Lesches  MRN:     [id number]     Acct:      [de-identified]   Room:   2009/2009-01  IP Day:  2  Admit Date:  11/18/2022 11:46 AM    PCP:  PROVIDER Alicia Fernandez MD  Code Status:  Full Code    Subjective:     C/C:   Chief Complaint   Patient presents with    Shortness of Breath     Interval History Status: not changed. The patient was seen and examined at the bedside. The patient is more alert today. Patient is on Propofol, Levophed, Vasopressin. Heparin drip discontinued because of halina sized clots in stool and hgb drop to 7.6. Fi02 decreased to 30. Brief History:      The patient is a 79 y.o. 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 The nursing staff had concerns for aspiration. His oxygen saturation dropped to the 70s. The patient was transferred to [facility] for evaluation. In the ED, the patient was tachycardic (106), tachypneic (RR 25), and hypoxic (02 saturation in 70s). The patient was started on a 100% non re-breather. The patient was then weaned down to a 10 L salter (02 saturation 98%). The salter was then decreased to 8L. Chest x-ray showed bibasilar pulmonary opacities suggesting pneumonia. The patient was started on Cefepime and Clindamycin. EKG showed sinus tachycardia with 1st degree AV block, left anterior fascicular block, and anterolateral infarct (age undetermined). WBCS were elevated at 16.7. Hgb was 11.3. ABGs showed pH 7.405, pC02 61.4, p02 81.2, Hc03 38.4. Blood cultures were drawn and results are pending.  The patient was given normal saline bolus. The patient was admitted for acute hypoxic, hypercapnic respiratory failure secondary to suspected bilateral aspiration pneumonia    Review of Systems:     Review of Systems    Unable to perform due to patient status    Medications: Allergies:     Allergies   Allergen Reactions    Penicillins Rash       Current Meds:   Scheduled Meds:    insulin glargine  10 Units SubCUTAneous BID    insulin lispro  0-4 Units SubCUTAneous Nightly    insulin lispro  0-16 Units SubCUTAneous Q4H    sodium chloride flush  5-40 mL IntraVENous 2 times per day    cefepime  1,000 mg IntraVENous Q12H    [Held by provider] bumetanide  1 mg Per G Tube Daily    [Held by provider] carvedilol  6.25 mg Per G Tube BID    [Held by provider] furosemide  40 mg Per G Tube Daily    [Held by provider] gabapentin  200 mg Per G Tube Nightly    [Held by provider] sacubitril-valsartan  1 tablet Per G Tube BID    [Held by provider] spironolactone  25 mg Per G Tube Daily    sertraline  50 mg Per G Tube Daily    albumin human  25 g IntraVENous Q8H    clindamycin (CLEOCIN) IV  600 mg IntraVENous Q8H    polyvinyl alcohol  1 drop Both Eyes Q4H    And    artificial tears   Both Eyes Q4H    chlorhexidine  15 mL Mouth/Throat BID    heparin (porcine)  4,000 Units IntraVENous Once    pantoprazole (PROTONIX) 40 mg injection  40 mg IntraVENous Q12H     Continuous Infusions:    sodium chloride      dextrose      sodium chloride 50 mL/hr at 11/20/22 0006    vasopressin (Septic Shock) infusion Stopped (11/19/22 1148)    norepinephrine (LEVOPHED) infusion 2 mcg/min (11/20/22 0920)    propofol 10 mcg/kg/min (11/20/22 0051)    heparin (PORCINE) Infusion 18 Units/kg/hr (11/20/22 0313)     PRN Meds: sodium chloride flush, sodium chloride, ondansetron **OR** ondansetron, polyethylene glycol, acetaminophen **OR** acetaminophen, potassium chloride, glucose, dextrose bolus **OR** dextrose bolus, glucagon (rDNA), dextrose, HYDROcodone-acetaminophen, fentanNYL, heparin (porcine), heparin (porcine)    Data:     Past Medical History:   has a past medical history of 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 ([address] Acquired absence of right leg below knee (HCC), Acute kidney failure ([facility] [address] Acute respiratory failure with hypoxia ([facility] [address] Cerebral infarction due to embolism ([facility] [address] Chronic kidney disease, unspecified, Chronic systolic (congestive) heart failure ([facility] [address] Depression, Heart failure ([facility] [address] Hypertension, Muscle weakness (generalized), Other symbolic dysfunctions, Peripheral vascular disease ([facility] [address] and Type 2 diabetes mellitus ([facility] [address] Social History:        Family History: History reviewed. No pertinent family history. Vitals:  /73   Pulse 93   Temp 98.6 °F (37 °C) (Axillary)   Resp 24   Ht 6' 3\" (1.905 m)   Wt 150 lb 5.7 oz (68.2 kg)   SpO2 99%   BMI 18.79 kg/m²   Temp (24hrs), Av.5 °F (36.9 °C), Min:98.2 °F (36.8 °C), Max:98.8 °F (37.1 °C)    Recent Labs     22  2107 22  0041 22  0424 22  0725   POCGLU 223* 214* 201* 181*       I/O(24Hr): Intake/Output Summary (Last 24 hours) at 2022 0945  Last data filed at 2022 0600  Gross per 24 hour   Intake 2811.24 ml   Output 1475 ml   Net 1336.24 ml       Labs:  [unfilled]    Lab Results   Component Value Date/Time    SPECIAL RIGHT HAND 2022 11:56 AM     Lab Results   Component Value Date/Time    CULTURE CULTURE IN PROGRESS 2022 06:23 PM       [unfilled]    Radiology:    US RENAL LIMITED    Result Date: 2022  EXAMINATION: ULTRASOUND OF THE KIDNEYS 2022 6:22 am COMPARISON: None. HISTORY: ORDERING SYSTEM PROVIDED HISTORY: EDELMIRA TECHNOLOGIST PROVIDED HISTORY: EDELMIRA FINDINGS: The right kidney measures 10.9 cm in length and the left kidney measures 10.9 cm in length. Bilateral renal cysts measuring measuring up to 2.5 cm on the right and 3.1 cm on the left. Kidneys otherwise demonstrate normal cortical echogenicity.   No hydronephrosis or intrarenal stones. No focal lesions. Benign bilateral renal cysts     XR CHEST PORTABLE    Result Date: 11/20/2022  EXAMINATION: ONE XRAY VIEW OF THE CHEST 11/20/2022 2:53 am COMPARISON: 11/19/2022 HISTORY: ORDERING SYSTEM PROVIDED HISTORY: ETT placement TECHNOLOGIST PROVIDED HISTORY: ETT placement Reason for Exam: ETT placement Additional signs and symptoms: ETT placement Relevant Medical/Surgical History: ETT placement FINDINGS: There is an ET tube with the tip 5.3 cm above the amparo. There is an NG tube overlying the left upper quadrant, however the most distal portion of the tube is not visualized. Cardiac size is enlarged. Stable bibasal infiltrates with underlying left basilar atelectasis. .  The pulmonary vascularity is stable. No pneumothorax. No significant pleural effusions identified . Lucyann Push Stable chest.     XR CHEST PORTABLE    Result Date: 11/19/2022  EXAMINATION: ONE XRAY VIEW OF THE CHEST 11/19/2022 5:49 am COMPARISON: 11/18/2022 HISTORY: ORDERING SYSTEM PROVIDED HISTORY: ETT placement TECHNOLOGIST PROVIDED HISTORY: ETT placement Reason for Exam: ETT placement Additional signs and symptoms: ETT placement Relevant Medical/Surgical History: ETT placement FINDINGS: Endotracheal tube approximately 6 cm above the amparo. Enteric catheter courses into the abdomen. Heart size stable. Bibasilar pulmonary opacities. No pneumothorax. No new airspace disease. Persistent bibasilar pulmonary opacities could represent atelectasis or pneumonia     XR CHEST PORTABLE    Result Date: 11/18/2022  EXAMINATION: ONE XRAY VIEW OF THE CHEST 11/18/2022 6:00 pm COMPARISON: Radiograph performed earlier the same day HISTORY: ORDERING SYSTEM PROVIDED HISTORY: intubated TECHNOLOGIST PROVIDED HISTORY: intubated Reason for Exam: Intubation FINDINGS: Endotracheal tube terminates 7 cm above the amparo. Enteric tube is below the diaphragm. Cardiomediastinal silhouette is unchanged in size. Aortic atherosclerosis.   No large pleural effusion or pneumothorax. There is patchy opacity in the right lung base. There is linear scarring/atelectasis in the left lung base. 1. Lines and tubes in expected position. 2. Bibasilar pulmonary opacities are again noted. XR CHEST PORTABLE    Result Date: 11/18/2022  EXAMINATION: ONE XRAY VIEW OF THE CHEST 11/18/2022 11:58 am COMPARISON: 09/27/2022 HISTORY: ORDERING SYSTEM PROVIDED HISTORY: sob TECHNOLOGIST PROVIDED HISTORY: sob Reason for Exam: sob FINDINGS: Stable cardiomegaly. Bibasilar pulmonary opacities noted. No pulmonary vascular congestion or edema. No pneumothorax. Bibasilar pulmonary opacities suggesting pneumonia         Physical Examination:        Physical Exam  Constitutional:       Appearance: He is ill-appearing. HENT:      Head: Normocephalic and atraumatic. Nose: No congestion or rhinorrhea. Eyes:      Extraocular Movements: Extraocular movements intact. Conjunctiva/sclera: Conjunctivae normal.      Pupils: Pupils are equal, round, and reactive to light. Cardiovascular:      Rate and Rhythm: Normal rate and regular rhythm. Pulses: Normal pulses. Heart sounds: Normal heart sounds. No murmur heard. No friction rub. No gallop. Pulmonary:      Effort: No respiratory distress. Breath sounds: Rhonchi present. No wheezing or rales. Abdominal:      General: Abdomen is flat. Bowel sounds are normal. There is no distension. Tenderness: There is no guarding. Musculoskeletal:      Right lower leg: No edema. Left lower leg: No edema. Skin:     Capillary Refill: Capillary refill takes less than 2 seconds. Coloration: Skin is not jaundiced or pale. Neurological:      Sensory: No sensory deficit. Motor: No weakness.          Assessment:        Primary Problem  Acute respiratory failure with hypoxia and hypercapnia [facility])    Active Hospital Problems    Diagnosis Date Noted    Acute respiratory failure with hypoxia and hypercapnia ([facility] [J96.01, J96.02] 11/18/2022     Priority: Medium    Aspiration pneumonia, unspecified aspiration pneumonia type, unspecified laterality, unspecified part of lung ([facility] [J69.0] 11/18/2022     Priority: Medium       Plan:        Acute hypoxic hypercapnic respiratory failure 2/2 to bilateral aspiration pneumonia   -Patient was hypoxic on presentation (02 sat in 70s); currently intubated on ventilator; currently saturating 100%; Fi02 40. -CXR: bibasilar pulmonary opacities suggesting pneumonia  -CXR today: persistent bibasilar pulmonary opacities could represent atelectasis vs. pneumonia  -CT scan of chest once medically stable  -WBCS elevated at 16.7; today 17.9  -ABG on admission: pH 7.405, pC02 61.4, p02 81.2, HC03 38.4  -ABGs today: pH 7.12, pC02 49.7, p02 110., HC03 31.7  -Cefepime and Clindamycin given in ED; continue IV Cefepime and Clindamycin   -Propofol 10 mcg  -Levophed 4 mcg  -Off Vasopressin   -Patient is off heparin drip due to clots found in stool and hgb drop to 7.6  -Blood cultures: no growth at 1 day  -Respiratory culture pending  -Pro-calcitonin 0.27  -Lactate sepsis pending   -Aspiration precautions  -Pulmonology/critical care on board      Acute on Chronic kidney disease  -Creatinine on admission 2.54 (baseline 1.31); Cr today 2.05 (trending down)   -Urine Creatinine 40.6, random urine sodium 29  -Renal ultrasound: benign bilateral renal cysts. -Nephrology consulted (continue IV fluids at 50; monitor urine output.        DM2  -Glucose on admission: 186; today 181  -start high dose sliding scale; continue 10 U lantus  -POCT x4  -initiate hypoglycemic protocol  -Continue Gabapentin 200 mg nightly      Hyponatremia-resolved  -Na on admission 132; today 141  -Continue to monitor   -Nephrology consulted      History of embolic stroke  -Hold Warfarin 10 mg po daily (unclear if Warfarin is for severe peripheral artery disease or for possible history of atrial fibrillation)  -Patient

## 2022-11-20 NOTE — PLAN OF CARE
Problem: Discharge Planning  Goal: Discharge to home or other facility with appropriate resources  Outcome: Progressing     Problem: Chronic Conditions and Co-morbidities  Goal: Patient's chronic conditions and co-morbidity symptoms are monitored and maintained or improved  Outcome: Progressing     Problem: Safety - Medical Restraint  Goal: Remains free of injury from restraints (Restraint for Interference with Medical Device)  Description: INTERVENTIONS:  1. Determine that other, less restrictive measures have been tried or would not be effective before applying the restraint  2. Evaluate the patient's condition at the time of restraint application  3. Inform patient/family regarding the reason for restraint  4. Q2H: Monitor safety, psychosocial status, comfort, nutrition and hydration  Outcome: Progressing  Flowsheets (Taken 11/20/2022 0400 by Susan Lewis RN)  Remains free of injury from restraints (restraint for interference with medical device): Determine that other, less restrictive measures have been tried or would not be effective before applying the restraint     Problem: Pain  Goal: Verbalizes/displays adequate comfort level or baseline comfort level  Outcome: Progressing     Problem: Skin/Tissue Integrity  Goal: Absence of new skin breakdown  Description: 1. Monitor for areas of redness and/or skin breakdown  2. Assess vascular access sites hourly  3. Every 4-6 hours minimum:  Change oxygen saturation probe site  4. Every 4-6 hours:  If on nasal continuous positive airway pressure, respiratory therapy assess nares and determine need for appliance change or resting period.   Outcome: Progressing     Problem: Safety - Adult  Goal: Free from fall injury  Outcome: Progressing     Problem: ABCDS Injury Assessment  Goal: Absence of physical injury  Outcome: Progressing

## 2022-11-20 NOTE — FLOWSHEET NOTE
Dr. Cassie Cobb notified that patient had Large red BM with halina size clots and I discontinued the Heparin drip and tube feeding. Per Dr. Cassie Cobb consult GI and check HgB/Hct Q 6 hours. Orders placed in Epic.

## 2022-11-20 NOTE — PROGRESS NOTES
Pt after extubation was tolerating room air however at this time pt was placed on 3l d/t SpO2 88%.  Pt may need NIV overnight

## 2022-11-20 NOTE — PROGRESS NOTES
Patient placed on weaning trial at 0830. Patient extubated without complication at 3867 with Rn and Rt at bedside.

## 2022-11-20 NOTE — PROGRESS NOTES
Order obtained for extubation. SpO2 of 96 on 30% FiO2. Patient extubated and placed on room air. Post extubation SpO2 is 95% with HR  100 bpm and RR 20 breaths/min. Patient had strong  cough that was productive . Extubation tolerated well by pt .    Breath Sounds: clear/diminished     Jorden Peng RCP   10:19 AM

## 2022-11-20 NOTE — PROGRESS NOTES
NEPHROLOGY progress note    Patient :  Tejinder Gil; 79 y.o. MRN# [id number]  Location:  2009/2009-01  Attending:  Cathy Leonardo MD  Admit Date:  11/18/2022   Hospital Day: 2      Reason for Consult: Acute kidney injury      Chief Complaint:    History Obtained From: electronic medical record and nursing staff      Subjective/interval history. Patient seen and examined in ICU patient is extubated on nasal cannula  Patient is awake alert and responsive  BUN is improving to 116 creatinine 2.0 mg/dL  Urine output 1.4 L yesterday in 24 hours      History of Present Illness: This is a 79 y.o. 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 Patient had an episode of vomiting and developed shortness of breath and therefore aspiration pneumonia suspected patient was noted to be hypoxic with pulse ox in 70s on room air.   Patient initially was placed on nonrebreather and then patient was intubated last night currently on ventilator  Patient also noted to be hypotensive, on Levophed  Labs reviewed, serum creatinine on admission BUN was noted to be 139 and creatinine 2.5 mg/dL, patient's last serum creatinine on November 11, 2022 was 1.2 mg/dL  Patient is nonoliguric urine output 650 mL overnight    Past Medical History:        Diagnosis Date 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 (HCC)     Acquired absence of right leg below knee (HCC)     Acute kidney failure (HCC)     Acute respiratory failure with hypoxia (HCC)     Cerebral infarction due to embolism (HCC)     Chronic kidney disease, unspecified     Chronic systolic (congestive) heart failure (HCC)     Depression     Heart failure (HCC)     Hypertension     Muscle weakness (generalized)     Other symbolic dysfunctions     Peripheral vascular disease (HCC)     Type 2 diabetes mellitus (HCC)        Past Surgical History:        Procedure Laterality Date    LEG AMPUTATION BELOW KNEE Bilateral        Current Medications:    insulin glargine (LANTUS) injection vial 10 Units, BID  insulin lispro (HUMALOG) injection vial 0-4 Units, Nightly  insulin lispro (HUMALOG) injection vial 0-16 Units, Q4H  sodium chloride flush 0.9 % injection 5-40 mL, 2 times per day  sodium chloride flush 0.9 % injection 5-40 mL, PRN  0.9 % sodium chloride infusion, PRN  ondansetron (ZOFRAN-ODT) disintegrating tablet 4 mg, Q8H PRN   Or  ondansetron (ZOFRAN) injection 4 mg, Q6H PRN  polyethylene glycol (GLYCOLAX) packet 17 g, Daily PRN  acetaminophen (TYLENOL) tablet 650 mg, Q6H PRN   Or  acetaminophen (TYLENOL) suppository 650 mg, Q6H PRN  potassium chloride 10 mEq/100 mL IVPB (Peripheral Line), PRN  cefepime (MAXIPIME) 1,000 mg in sodium chloride 0.9 % 50 mL IVPB (mini-bag), Q12H  glucose chewable tablet 16 g, PRN  dextrose bolus 10% 125 mL, PRN   Or  dextrose bolus 10% 250 mL, PRN  glucagon (rDNA) injection 1 mg, PRN  dextrose 10 % infusion, Continuous PRN  [Held by provider] bumetanide (BUMEX) tablet 1 mg, Daily  [Held by provider] carvedilol (COREG) tablet 6.25 mg, BID  [Held by provider] furosemide (LASIX) tablet 40 mg, Daily  [Held by provider] gabapentin (NEURONTIN) capsule 200 mg, Nightly  HYDROcodone-acetaminophen (NORCO) 5-325 MG per tablet 1 tablet, Q6H PRN  [Held by provider] sacubitril-valsartan (ENTRESTO) 24-26 MG per tablet 1 tablet, BID  [Held by provider] spironolactone (ALDACTONE) tablet 25 mg, Daily  sertraline (ZOLOFT) tablet 50 mg, Daily  0.9 % sodium chloride infusion, Continuous  albumin human 25 % IV solution 25 g, Q8H  vasopressin 20 Units in dextrose 5 % 100 mL infusion, Continuous  clindamycin (CLEOCIN) 600 mg in dextrose 5 % 50 mL IVPB, Q8H  norepinephrine (LEVOPHED) 16 mg in dextrose 5 % 250 mL infusion, Continuous  heparin (porcine) injection 4,000 Units, Once  heparin (porcine) injection 4,000 Units, PRN  heparin (porcine) injection 2,000 Units, PRN  heparin 25,000 units in dextrose 5% 250 mL (premix) infusion, Continuous  pantoprazole (PROTONIX) 40 mg in sodium chloride (PF) 0.9 % 10 mL injection, Q12H    Objective:  CURRENT TEMPERATURE:  Temp: 98.6 °F (37 °C)  MAXIMUM TEMPERATURE OVER 24HRS:  Temp (24hrs), Av.6 °F (37 °C), Min:98.3 °F (36.8 °C), Max:98.8 °F (37.1 °C)    CURRENT RESPIRATORY RATE:  Resp: 27  CURRENT PULSE:  Heart Rate: 99  CURRENT BLOOD PRESSURE:  BP: 100/62  24HR BLOOD PRESSURE RANGE:  Systolic (94SPW), HRX:332 , Min:84 , TY   ; Diastolic (39JCN), LHC:90, Min:48, Max:81    24HR INTAKE/OUTPUT:    Intake/Output Summary (Last 24 hours) at 2022 1213  Last data filed at 2022 0600  Gross per 24 hour   Intake 2811.24 ml   Output 1475 ml   Net 1336.24 ml     Patient Vitals for the past 96 hrs (Last 3 readings):   Weight   22 1515 150 lb 5.7 oz (68.2 kg)   22 1202 155 lb (70.3 kg)       Physical Exam:  GENERAL APPEARANCE: Patient is awake alert responsive  HEAD: normocephalic  EYES:  EOMI. Not pale, anicteric   NOSE:  No nasal discharge. THROAT:  Oral cavity and pharynx normal. Moist  CARDIAC: Normal S1 and S2. No S3, S4 or murmurs. Rhythm is regular. LUNGS:diminished breath sounds. Coarse breath sounds no accessory muscle use  NECK: Trachea midline  MUSCULOSKELETAL: No swelling of the joints  EXTREMITIES: No edema.   Bilateral BKA  NEURO: Nonfocal      Labs: CBC:  Recent Labs     11/18/22  1945 11/19/22  0557 11/19/22  2104 11/20/22  0547   WBC 27.8* 17.9*  --  13.7*   RBC 3.45* 2.74*  --  2.49*   HGB 10.3* 8.4* 8.1* 7.6*   HCT 32.6* 25.6* 24.2* 23.5*   MCV 94.3 93.2  --  94.3   MCH 29.9 30.7  --  30.5   MCHC 31.7 32.9  --  32.3   RDW 16.7* 16.6*  --  16.5*    183  --  147*   MPV 10.1 10.1  --  9.9      BMP:   Recent Labs     11/18/22  1225 11/19/22  0557 11/20/22  0547   * 134* 141   K 4.9 4.8 3.8   CL 86* 90* 98   CO2 36* 32* 30   * 140* 116*   CREATININE 2.54* 2.48* 2.05*   GLUCOSE 186* 309* 241*   CALCIUM 9.6 8.8 8.7      Phosphorus:  No results for input(s): PHOS in the last 72 hours. Magnesium:   Recent Labs     11/18/22  1140 11/18/22  1225   MG PLEASE DISREGARD RESULTS. SPECIMEN CONTAMINATED. 4.1*     Albumin:   Recent Labs     11/18/22  1140 11/18/22  1225   LABALBU PLEASE DISREGARD RESULTS. SPECIMEN CONTAMINATED. 3.6       IRON:  No results for input(s): IRON in the last 72 hours. Iron Saturation:  Invalid input(s): PERCENTFE  TIBC:  No results for input(s): TIBC in the last 72 hours. FERRITIN:  No results for input(s): FERRITIN in the last 72 hours. SPEP: No results for input(s): SPEP in the last 72 hours. Recent Labs     11/18/22  1225   PROT 7.6     UPEP: No results for input(s): TPU in the last 72 hours. Urine Sodium:    Recent Labs     11/18/22  1650   VÍCTOR 29      Urine Potassium: No results for input(s): KUR in the last 72 hours. Urine Chloride:  No results for input(s): CLU in the last 72 hours. Urine Ph:  Invalid input(s): PO4U  Urine Osmolarity: No results for input(s): OSMOU in the last 72 hours. Urine Creatinine:    Recent Labs     11/18/22  1650   LABCREA 40.6     Urine Eosinophils: Invalid input(s): EOSU  Urine Protein:  No results for input(s): TPU in the last 72 hours.   Urinalysis:    Recent Labs     11/18/22  1650   NITRU NEGATIVE   COLORU Yellow   PHUR 5.0   WBCUA 3 to 5   RBCUA 0 TO 2   BACTERIA FEW* SPECGRAV 1.015   LEUKOCYTESUR NEGATIVE   UROBILINOGEN Normal   BILIRUBINUR NEGATIVE   GLUCOSEU NEGATIVE   [place] NEGATIVE   AMORPHOUS 1+*         Radiology:  Chest x-ray persistent bibasilar pulmonary opacities could represent atelectasis or pneumonia    Assessment:    Acute kidney injury on CKD, nonoliguric most likely secondary to prerenal azotemia due to hypotension, other causes includes use of diuretics and Entresto, Aldactone which are on hold  Serum creatinine peaked to 2.5 mg/dL from 1.2 mg/dL ---> BUN improved to 116 creatinine improved to 2.0 mg/dL, nonoliguric good urine output 1.4 L urine output    3. Hypotension --pressure improved off of Levophed  3. Acute hypoxic respiratory failure most likely secondary to aspiration pneumonia  4. History of CHF with reduced EF EF of 15 to 65% systolic dysfunction  5. Type 2 diabetes  5. Bilateral BKA       Plan:  Continue IV fluids, watch for volume overload IV fluids decreased to 50 and mL per hour    Discussed with the patient regarding kidney dysfunction and dialysis. Patient has no objection with dialysis if there is a need     brother Azul Berkowitz who is power of , he does not want to pursue dialysis. No emergent need for dialysis at this point kidney function slowly improving    Continue medical management          Thank you for the consultation.       Electronically signed by MD Vishnu on 11/20/2022 at 12:13 PM

## 2022-11-21 LAB
ABSOLUTE BANDS #: 0.08 K/UL (ref 0–1)
ABSOLUTE EOS #: 0 K/UL (ref 0–0.4)
ABSOLUTE LYMPH #: 0.32 K/UL (ref 1–4.8)
ABSOLUTE MONO #: 0.49 K/UL (ref 0.1–1.3)
ANION GAP SERPL CALCULATED.3IONS-SCNC: 17 MMOL/L (ref 9–17)
BANDS: 1 % (ref 0–10)
BASOPHILS # BLD: 0 % (ref 0–2)
BASOPHILS ABSOLUTE: 0 K/UL (ref 0–0.2)
BUN BLDV-MCNC: 91 MG/DL (ref 8–23)
CALCIUM SERPL-MCNC: 8.9 MG/DL (ref 8.6–10.4)
CHLORIDE BLD-SCNC: 105 MMOL/L (ref 98–107)
CO2: 23 MMOL/L (ref 20–31)
CREAT SERPL-MCNC: 1.84 MG/DL (ref 0.7–1.2)
DATE, STOOL #1: NORMAL
EKG ATRIAL RATE: 102 BPM
EKG P AXIS: 28 DEGREES
EKG P-R INTERVAL: 222 MS
EKG Q-T INTERVAL: 348 MS
EKG QRS DURATION: 92 MS
EKG QTC CALCULATION (BAZETT): 453 MS
EKG R AXIS: -45 DEGREES
EKG T AXIS: 87 DEGREES
EKG VENTRICULAR RATE: 102 BPM
EOSINOPHILS RELATIVE PERCENT: 0 % (ref 0–4)
GFR SERPL CREATININE-BSD FRML MDRD: 40 ML/MIN/1.73M2
GLUCOSE BLD-MCNC: 136 MG/DL (ref 75–110)
GLUCOSE BLD-MCNC: 151 MG/DL (ref 75–110)
GLUCOSE BLD-MCNC: 158 MG/DL (ref 75–110)
GLUCOSE BLD-MCNC: 192 MG/DL (ref 70–99)
HCT VFR BLD CALC: 22.2 % (ref 41–53)
HCT VFR BLD CALC: 23.2 % (ref 41–53)
HCT VFR BLD CALC: 26.5 % (ref 41–53)
HCT VFR BLD CALC: 27.1 % (ref 41–53)
HCT VFR BLD CALC: 29.3 % (ref 41–53)
HEMOCCULT SP1 STL QL: POSITIVE
HEMOGLOBIN: 7 G/DL (ref 13.5–17.5)
HEMOGLOBIN: 7.2 G/DL (ref 13.5–17.5)
HEMOGLOBIN: 8.4 G/DL (ref 13.5–17.5)
HEMOGLOBIN: 8.7 G/DL (ref 13.5–17.5)
HEMOGLOBIN: 8.9 G/DL (ref 13.5–17.5)
HEPARIN INDUCED PLATELET ANTIBODY: 0.36 O.D. (ref 0–0.4)
LYMPHOCYTES # BLD: 4 % (ref 24–44)
MCH RBC QN AUTO: 30.3 PG (ref 26–34)
MCHC RBC AUTO-ENTMCNC: 31.4 G/DL (ref 31–37)
MCV RBC AUTO: 96.5 FL (ref 80–100)
MONOCYTES # BLD: 6 % (ref 1–7)
MORPHOLOGY: ABNORMAL
PDW BLD-RTO: 16.6 % (ref 11.5–14.9)
PLATELET # BLD: 113 K/UL (ref 150–450)
PMV BLD AUTO: 9.8 FL (ref 6–12)
POTASSIUM SERPL-SCNC: 3.7 MMOL/L (ref 3.7–5.3)
RBC # BLD: 2.3 M/UL (ref 4.5–5.9)
SEG NEUTROPHILS: 89 % (ref 36–66)
SEGMENTED NEUTROPHILS ABSOLUTE COUNT: 7.21 K/UL (ref 1.3–9.1)
SODIUM BLD-SCNC: 145 MMOL/L (ref 135–144)
SPECIMEN DESCRIPTION: NORMAL
TIME, STOOL #1: NORMAL
WBC # BLD: 8.1 K/UL (ref 3.5–11)

## 2022-11-21 PROCEDURE — A4216 STERILE WATER/SALINE, 10 ML: HCPCS | Performed by: INTERNAL MEDICINE

## 2022-11-21 PROCEDURE — 99232 SBSQ HOSP IP/OBS MODERATE 35: CPT | Performed by: INTERNAL MEDICINE

## 2022-11-21 PROCEDURE — 85018 HEMOGLOBIN: CPT

## 2022-11-21 PROCEDURE — 86900 BLOOD TYPING SEROLOGIC ABO: CPT

## 2022-11-21 PROCEDURE — 36430 TRANSFUSION BLD/BLD COMPNT: CPT

## 2022-11-21 PROCEDURE — P9047 ALBUMIN (HUMAN), 25%, 50ML: HCPCS | Performed by: INTERNAL MEDICINE

## 2022-11-21 PROCEDURE — 2580000003 HC RX 258: Performed by: INTERNAL MEDICINE

## 2022-11-21 PROCEDURE — 2000000000 HC ICU R&B

## 2022-11-21 PROCEDURE — 97167 OT EVAL HIGH COMPLEX 60 MIN: CPT

## 2022-11-21 PROCEDURE — 2580000003 HC RX 258

## 2022-11-21 PROCEDURE — 99233 SBSQ HOSP IP/OBS HIGH 50: CPT | Performed by: INTERNAL MEDICINE

## 2022-11-21 PROCEDURE — 6360000002 HC RX W HCPCS: Performed by: INTERNAL MEDICINE

## 2022-11-21 PROCEDURE — 85025 COMPLETE CBC W/AUTO DIFF WBC: CPT

## 2022-11-21 PROCEDURE — 85014 HEMATOCRIT: CPT

## 2022-11-21 PROCEDURE — 36415 COLL VENOUS BLD VENIPUNCTURE: CPT

## 2022-11-21 PROCEDURE — 2500000003 HC RX 250 WO HCPCS: Performed by: INTERNAL MEDICINE

## 2022-11-21 PROCEDURE — APPSS30 APP SPLIT SHARED TIME 16-30 MINUTES: Performed by: NURSE PRACTITIONER

## 2022-11-21 PROCEDURE — 6370000000 HC RX 637 (ALT 250 FOR IP)

## 2022-11-21 PROCEDURE — C9113 INJ PANTOPRAZOLE SODIUM, VIA: HCPCS | Performed by: INTERNAL MEDICINE

## 2022-11-21 PROCEDURE — 82947 ASSAY GLUCOSE BLOOD QUANT: CPT

## 2022-11-21 PROCEDURE — P9016 RBC LEUKOCYTES REDUCED: HCPCS

## 2022-11-21 PROCEDURE — 97535 SELF CARE MNGMENT TRAINING: CPT

## 2022-11-21 PROCEDURE — 82272 OCCULT BLD FECES 1-3 TESTS: CPT

## 2022-11-21 PROCEDURE — 97163 PT EVAL HIGH COMPLEX 45 MIN: CPT

## 2022-11-21 PROCEDURE — 97530 THERAPEUTIC ACTIVITIES: CPT

## 2022-11-21 PROCEDURE — 99213 OFFICE O/P EST LOW 20 MIN: CPT

## 2022-11-21 PROCEDURE — 80048 BASIC METABOLIC PNL TOTAL CA: CPT

## 2022-11-21 PROCEDURE — 6360000002 HC RX W HCPCS

## 2022-11-21 RX ORDER — SODIUM CHLORIDE 9 MG/ML
INJECTION, SOLUTION INTRAVENOUS PRN
Status: DISCONTINUED | OUTPATIENT
Start: 2022-11-21 | End: 2022-11-21

## 2022-11-21 RX ORDER — SODIUM CHLORIDE 450 MG/100ML
INJECTION, SOLUTION INTRAVENOUS CONTINUOUS
Status: DISCONTINUED | OUTPATIENT
Start: 2022-11-21 | End: 2022-11-25 | Stop reason: HOSPADM

## 2022-11-21 RX ADMIN — CLINDAMYCIN IN 5 PERCENT DEXTROSE 600 MG: 12 INJECTION, SOLUTION INTRAVENOUS at 04:34

## 2022-11-21 RX ADMIN — ONDANSETRON 4 MG: 2 INJECTION INTRAMUSCULAR; INTRAVENOUS at 08:37

## 2022-11-21 RX ADMIN — SODIUM CHLORIDE, PRESERVATIVE FREE 10 ML: 5 INJECTION INTRAVENOUS at 20:03

## 2022-11-21 RX ADMIN — ACETAMINOPHEN 650 MG: 325 TABLET ORAL at 20:35

## 2022-11-21 RX ADMIN — SODIUM CHLORIDE: 9 INJECTION, SOLUTION INTRAVENOUS at 00:49

## 2022-11-21 RX ADMIN — ONDANSETRON 4 MG: 2 INJECTION INTRAMUSCULAR; INTRAVENOUS at 20:02

## 2022-11-21 RX ADMIN — CEFEPIME 1000 MG: 1 INJECTION, POWDER, FOR SOLUTION INTRAMUSCULAR; INTRAVENOUS at 22:49

## 2022-11-21 RX ADMIN — SODIUM CHLORIDE, PRESERVATIVE FREE 10 ML: 5 INJECTION INTRAVENOUS at 08:48

## 2022-11-21 RX ADMIN — SODIUM CHLORIDE: 4.5 INJECTION, SOLUTION INTRAVENOUS at 21:23

## 2022-11-21 RX ADMIN — SODIUM CHLORIDE, PRESERVATIVE FREE 40 MG: 5 INJECTION INTRAVENOUS at 20:02

## 2022-11-21 RX ADMIN — CEFEPIME 1000 MG: 1 INJECTION, POWDER, FOR SOLUTION INTRAMUSCULAR; INTRAVENOUS at 12:30

## 2022-11-21 RX ADMIN — INSULIN GLARGINE 10 UNITS: 100 INJECTION, SOLUTION SUBCUTANEOUS at 08:44

## 2022-11-21 RX ADMIN — CLINDAMYCIN IN 5 PERCENT DEXTROSE 600 MG: 12 INJECTION, SOLUTION INTRAVENOUS at 20:35

## 2022-11-21 RX ADMIN — CLINDAMYCIN IN 5 PERCENT DEXTROSE 600 MG: 12 INJECTION, SOLUTION INTRAVENOUS at 14:03

## 2022-11-21 RX ADMIN — ALBUMIN (HUMAN) 25 G: 0.25 INJECTION, SOLUTION INTRAVENOUS at 08:47

## 2022-11-21 RX ADMIN — ONDANSETRON 4 MG: 2 INJECTION INTRAMUSCULAR; INTRAVENOUS at 03:51

## 2022-11-21 RX ADMIN — SODIUM CHLORIDE, PRESERVATIVE FREE 40 MG: 5 INJECTION INTRAVENOUS at 08:38

## 2022-11-21 ASSESSMENT — ENCOUNTER SYMPTOMS
CONSTIPATION: 0
SHORTNESS OF BREATH: 0
COUGH: 0
WHEEZING: 0
VOMITING: 0
ABDOMINAL DISTENTION: 0
NAUSEA: 0
DIARRHEA: 0
RHINORRHEA: 0
ABDOMINAL PAIN: 0
STRIDOR: 0

## 2022-11-21 ASSESSMENT — PAIN SCALES - GENERAL
PAINLEVEL_OUTOF10: 0
PAINLEVEL_OUTOF10: 3

## 2022-11-21 ASSESSMENT — PAIN DESCRIPTION - LOCATION: LOCATION: GENERALIZED

## 2022-11-21 NOTE — CONSULTS
[id number] [facility] Wound Ostomy Continence Nurse  Consult Note       NAME:  Imelda Gooden  MEDICAL RECORD NUMBER:  [id number]  AGE: 79 y.o. GENDER: male  : 1955  TODAY'S DATE:  2022    Subjective:      Imelda Gooden is a 79 y.o. 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 ([facility] [address]     Acquired absence of right leg below knee ([address]     Acute kidney failure (HCC)     Acute respiratory failure with hypoxia (HCC)     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 No pertinent family history. SOCIAL HISTORY    Social History     Tobacco Use    Smoking status: Unknown         ALLERGIES    Allergies   Allergen Reactions    Penicillins Rash       HOME MEDICATIONS  Prior to Admission medications    Medication Sig Start Date End Date Taking?  Authorizing Provider   carvedilol (COREG) 6.25 MG tablet 6.25 mg by Per G Tube route 2 times daily   Yes Historical Provider, MD   sacubitril-valsartan (ENTRESTO) 24-26 MG per tablet 1 tablet by Per G Tube route 2 times daily   Yes Historical Provider, MD   furosemide (LASIX) 40 MG tablet 40 mg by Per G Tube route daily   Yes Historical Provider, MD   gabapentin (NEURONTIN) 100 MG capsule 200 mg by Per G Tube route at bedtime. 11/11/22 11/18/22 Yes Historical Provider, MD   HYDROcodone-acetaminophen (NORCO) 5-325 MG per tablet Take 1 tablet by mouth every 6 hours as needed for Pain.  Given per g-tube   Yes Historical Provider, MD   insulin lispro (HUMALOG) 100 UNIT/ML SOLN injection vial Inject into the skin 3 times daily (with meals) 351-400: 10 units, 301-350: 8 units, 351-300: 6 units, 201-250: 4 units, 151-200: 2 units   Yes Historical Provider, MD   magnesium oxide (MAG-OX) 400 MG tablet 400 mg by Per G Tube route 2 times daily   Yes Historical Provider, MD   sertraline (ZOLOFT) 50 MG tablet 50 mg by Per G Tube route daily   Yes Historical Provider, MD   warfarin (COUMADIN) 7.5 MG tablet 7.5 mg by Per G Tube route every evening   Yes Historical Provider, MD   bumetanide (BUMEX) 1 MG tablet 1 mg by Per G Tube route daily    Historical Provider, MD   senna-docusate (PERICOLACE) 8.6-50 MG per tablet 1 tablet by Per G Tube route every 12 hours as needed for Constipation    Historical Provider, MD   spironolactone (ALDACTONE) 25 MG tablet 25 mg by Per G Tube route daily    Historical Provider, MD   polyethylene glycol (GLYCOLAX) 17 GM/SCOOP powder 17 g by Per G Tube route daily    Historical Provider, MD       CURRENT MEDICATIONS:  Current Facility-Administered Medications   Medication Dose Route Frequency Provider Last Rate Last Admin    0.9 % sodium chloride infusion   IntraVENous PRN Parvin Wynn MD        0.9 % sodium chloride infusion   IntraVENous PRN Clint Pagan MD        insulin glargine (LANTUS) injection vial 10 Units  10 Units SubCUTAneous BID Parvin Wynn MD   10 Units at 11/21/22 0844    insulin lispro (HUMALOG) injection vial 0-4 Units  0-4 Units SubCUTAneous Nightly Griffin Wilson MD        insulin lispro (HUMALOG) injection vial 0-16 Units  0-16 Units SubCUTAneous Q4H Paola Bermudez MD   4 Units at 11/20/22 0437    sodium chloride flush 0.9 % injection 5-40 mL  5-40 mL IntraVENous 2 times per day Griffin Wilson MD   10 mL at 11/21/22 0848    sodium chloride flush 0.9 % injection 5-40 mL  5-40 mL IntraVENous PRN Griffin Wilson MD        0.9 % sodium chloride infusion   IntraVENous PRN Griffin Wilson MD        ondansetron (ZOFRAN-ODT) disintegrating tablet 4 mg  4 mg Oral Q8H PRN Griffin Wilson MD        Or    ondansetron (ZOFRAN) injection 4 mg  4 mg IntraVENous Q6H PRN Griffin Wilson MD   4 mg at 11/21/22 0837    polyethylene glycol (GLYCOLAX) packet 17 g  17 g Oral Daily PRN Griffin Wilson MD        acetaminophen (TYLENOL) tablet 650 mg  650 mg Oral Q6H PRN Griffin Wilson MD        Or    acetaminophen (TYLENOL) suppository 650 mg  650 mg Rectal Q6H PRN Griffin Wilson MD        potassium chloride 10 mEq/100 mL IVPB (Peripheral Line)  10 mEq IntraVENous PRN Griffin Wilson MD        cefepime (MAXIPIME) 1,000 mg in sodium chloride 0.9 % 50 mL IVPB (mini-bag)  1,000 mg IntraVENous Q12H Griffin Wilson MD   Stopped at 11/21/22 1300    glucose chewable tablet 16 g  4 tablet Oral PRN Griffin Wilson MD        dextrose bolus 10% 125 mL  125 mL IntraVENous PRN Griffin Wilson MD        Or    dextrose bolus 10% 250 mL  250 mL IntraVENous PRN Griffin Wilson MD        glucagon (rDNA) injection 1 mg  1 mg SubCUTAneous PRN Griffin Wilson MD        dextrose 10 % infusion   IntraVENous Continuous PRN Griffin Wilson MD        [Held by provider] bumetanide (BUMEX) tablet 1 mg  1 mg Per G Tube Daily Griffin Wilson MD        [Held by provider] carvedilol (COREG) tablet 6.25 mg  6.25 mg Per G Tube BID Griffin Wilson MD        [Held by provider] furosemide (LASIX) tablet 40 mg  40 mg Per G Tube Daily Griffin Wilson MD        [Held by provider] gabapentin (NEURONTIN) capsule 200 mg  200 mg Per G Tube Nightly Shana Armstrong MD        HYDROcodone-acetaminophen (NORCO) 5-325 MG per tablet 1 tablet  1 tablet Oral Q6H PRN Shana Armstrong MD        [Held by provider] sacubitril-valsartan (ENTRESTO) 24-26 MG per tablet 1 tablet  1 tablet Per G Tube BID Shana Armstrong MD        [Held by provider] spironolactone (ALDACTONE) tablet 25 mg  25 mg Per G Tube Daily Shana Armstrong MD        sertraline (ZOLOFT) tablet 50 mg  50 mg Per G Tube Daily Shana Armstrong MD   50 mg at 11/20/22 0838    0.9 % sodium chloride infusion   IntraVENous Continuous Shana Armstrong MD 50 mL/hr at 11/21/22 0725 Rate Verify at 11/21/22 0725    vasopressin 20 Units in dextrose 5 % 100 mL infusion  0.04 Units/min IntraVENous Continuous Betsy Garcia MD   Stopped at 11/19/22 1148    clindamycin (CLEOCIN) 600 mg in dextrose 5 % 50 mL IVPB  600 mg IntraVENous Q8H Betsy Garcia MD   Stopped at 11/21/22 0504    norepinephrine (LEVOPHED) 16 mg in dextrose 5 % 250 mL infusion  1-35 mcg/min IntraVENous Continuous Betsy Garcia MD   Stopped at 11/20/22 1009    heparin (porcine) injection 4,000 Units  4,000 Units IntraVENous Once Cristina Kim MD        [Held by provider] heparin (porcine) injection 4,000 Units  4,000 Units IntraVENous PRN Cristina Kim MD   4,000 Units at 11/19/22 1918    [Held by provider] heparin (porcine) injection 2,000 Units  2,000 Units IntraVENous PRN Cristina Kim MD   2,000 Units at 11/20/22 0310    [Held by provider] heparin 25,000 units in dextrose 5% 250 mL (premix) infusion  5-30 Units/kg/hr IntraVENous Continuous Cristina Kim MD   Stopped at 11/20/22 0604    pantoprazole (PROTONIX) 40 mg in sodium chloride (PF) 0.9 % 10 mL injection  40 mg IntraVENous Q12H Cristina Kim MD   40 mg at 11/21/22 0838         Objective:      /81   Pulse 93   Temp 97.8 °F (36.6 °C)   Resp 28   Ht 6' 3\" (1.905 m)   Wt 151 lb 10.8 oz (68.8 kg)   SpO2 93%   BMI 18.96 kg/m²       LABS    CBC:   Lab Results   Component Value Date/Time    WBC 8.1 11/21/2022 04:23 AM    RBC 2.30 11/21/2022 04:23 AM    HGB 8.4 11/21/2022 12:22 PM     SED RATE:   Lab Results   Component Value Date    SEDRATE 54 (H) 09/28/2022       CMP:  Albumin:    Lab Results   Component Value Date/Time    LABALBU 3.6 11/18/2022 12:25 PM     PT/INR:    Lab Results   Component Value Date/Time    PROTIME 18.2 11/18/2022 07:45 PM    INR 1.5 11/18/2022 07:45 PM     HgBA1c:    Lab Results   Component Value Date/Time    LABA1C 5.7 11/18/2022 12:54 PM     PTT: No components found for: LABPTT      Assessment:       Aman Risk Score: Aman Scale Score: 13    Patient Active Problem List   Diagnosis Code    Sepsis ([facility] [address] A41.9    Septic shock ([facility] [address] A41.9, R65.21    Acute respiratory failure with hypoxia and hypercapnia (HCC) J96.01, J96.02    Aspiration pneumonia, unspecified aspiration pneumonia type, unspecified laterality, unspecified part of lung ([facility] [address] J69.0    Coffee ground emesis K92.0    Rectal bleeding K62.5         Measurements:  Wound 11/21/22 Pretibial Left (Active)   Wound Image   11/21/22 1132   Wound Etiology Arterial 11/21/22 1132   Dressing Status Old drainage noted 11/21/22 1132   Wound Cleansed Cleansed with saline 11/21/22 1132   Dressing/Treatment Dry dressing; Ace wrap 11/21/22 1132   Wound Length (cm) 3.6 cm 11/21/22 1132   Wound Width (cm) 2.2 cm 11/21/22 1132   Wound Depth (cm) 0.1 cm 11/21/22 1132   Wound Surface Area (cm^2) 7.92 cm^2 11/21/22 1132   Wound Volume (cm^3) 0.792 cm^3 11/21/22 1132   Wound Assessment Pink/red;Purple/maroon;Slough;Eschar dry 11/21/22 1132   Drainage Amount Small 11/21/22 1132   Drainage Description Serosanguinous 11/21/22 1132   Odor None 11/21/22 1132   Angie-wound Assessment Blanchable erythema 11/21/22 1132   Margins Defined edges 11/21/22 1132   Number of days: 0       Wound 11/21/22 Leg Left;Lateral;Lower (Active)   Wound Image   11/21/22 1132   Wound Etiology Arterial 11/21/22 1132   Dressing Status Dry;New dressing applied 11/21/22 1132   Wound Cleansed Betadine/povidone iodine 11/21/22 1132   Dressing/Treatment Betadine swabs/povidone iodine;Dry dressing; Ace wrap 11/21/22 1132   Wound Length (cm) 6.5 cm 11/21/22 1132   Wound Width (cm) 2.7 cm 11/21/22 1132   Wound Depth (cm) 0.1 cm 11/21/22 1132   Wound Surface Area (cm^2) 17.55 cm^2 11/21/22 1132   Wound Volume (cm^3) 1.755 cm^3 11/21/22 1132   Wound Assessment Eschar dry 11/21/22 1132   Drainage Amount None 11/21/22 1132   Odor None 11/21/22 1132   Angie-wound Assessment Blanchable erythema 11/21/22 1132   Margins Attached edges 11/21/22 1132   Number of days: 0         WOUND ASSESSMENT:   WOC nurse consult for left leg wounds. Pt is aphasic, history obtained from chart. 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 He was admitted from [facility] on 11/18 with suspected aspiration pneumonia. Upon assessment, pt still has staples present to BKA incision. The incision is well approximated with no signs of dehiscence and no drainage. There is a wound to the left pretib. Most of the wound is covered in dry eschar, but the proximal portion of the wound has opened and is draining. There is also a wound to the left lateral leg. This wound is covered in dry eschar with some purple discoloration at the proximal end. Primary RN discussed with residents and order obtained to remove staples from incision line. 18 staples removed without problems. Recommend keeping lateral leg wound dry with betadine swab and applying triad cream to pretib wound. Response to treatment:  Well tolerated by patient. Plan:     Plan of Care:     Left pretib: Cleanse with soap and water, pat dry. Apply triad cream to wound, cover with dry dressing, and wrap with ace bandage. Change daily and as needed if loose or soiled. Left lateral leg: Keep dry. Apply betadine swab daily, cover with dry dressing, and wrap with ace wrap. Change daily and as needed if loose or soiled.      [x] Turn and reposition every 2 hours while in bed. [x] Float heels off of bed with pillows under calves. [] Heel protective boots (heel medix boots) at all times while in bed.    [] Sacral foam dressing to sacrococcygeal area. Use skin barrier film prior to placement. Peel back dressing, inspect skin beneath, and re-secure every shift. Change every 3 days and as needed if loose or soiled. Discontinue sacral foam if repeatedly soiled by incontinence. [] Apply zinc oxide cream twice daily and as needed after incontinent episodes. [] Perform routine incontinence care with use of foam cleanser. [x] Use single layer moisture wicking underpad. [] Use comfort glide system and wedges to reposition patient. [x] Keep the head of the bed below 30 degrees unless contraindicated. [] Pressure reducing chair cushion while up to chair. Reposition every hour while in chair and limit chair time to 2 hour intervals. [x] Encourage good nutritional intake and fluids. Consult dietician if needed.     Specialty Bed Required : Yes   [] Low Air Loss   [x] Pressure Redistribution  [] Fluid Immersion  [] Bariatric  [] Total Pressure Relief  [] Other:     Current Diet: Diet NPO  ADULT TUBE FEEDING; PEG; Renal Formula; Continuous; 15; Yes; 15; Q 4 hours; 45; 100; Q 6 hours  Dietician consult:  Yes    Discharge Plan:  Placement for patient upon discharge: skilled nursing   Patient appropriate for Outpatient [address]: N/A    Patient/Caregiver Teaching:  Level of patientunderstanding able to:     [] Indicates understanding       [] Needs reinforcement  [] Unsuccessful      [] Verbal Understanding  [] Demonstrated understanding       [] No evidence of learning  [] Refused teaching         [x] N/A       Electronically signed by Oscar Wattesr RN on  11/21/2022 at 1:33 PM

## 2022-11-21 NOTE — PROGRESS NOTES
Comprehensive Nutrition Assessment    Type and Reason for Visit:  Consult (Nutritional assessemtn for Aman score)    Nutrition Recommendations/Plan:   Continue NPO as ordered. Follow for nutrition plan of care. Malnutrition Assessment:  Malnutrition Status:  Insufficient data (11/21/22 6595)    Context:  Acute Illness     Findings of the 6 clinical characteristics of malnutrition:  Energy Intake:  Unable to assess  Weight Loss:  Unable to assess     Body Fat Loss:  Unable to assess     Muscle Mass Loss:  Unable to assess    Fluid Accumulation:  Unable to assess     Strength:  Not Performed    Nutrition Assessment:    Pt had been sent from [facility] when after flushing PEG tube then he proceeded to have episodes of vomiting & SOB with concern for aspiration pneumonia. Pt has hx of embolic stroke with aphasia, DM, PVD, autism,CHF, CKD, PEG tube in place. Pt extubated yesterday with pulmonologist ok for pt to eat after having a swallow study. Nurse reports pt having bright red blood in stools & hemoglobin down to 7 with consult for GI. Wound care nurse seeing pt couple arterial wounds and noted staples still present to BKA incision. Wound care nurse contacted residents to see if staples could be removed with order to remove. Nutrition Related Findings:    No edema. Hx; embolic stroke with aphasia, autism, DM, PVD, CHF, CKD. Pt had been able to take oral diet per hx, unsure if PEG still being used to provide nutrition. Wound Type: Multiple       Current Nutrition Intake & Therapies:    Average Meal Intake: NPO     Diet NPO  ADULT TUBE FEEDING; PEG; Renal Formula; Continuous; 15; Yes; 15; Q 4 hours; 45; 100; Q 6 hours    Anthropometric Measures:  Height: 6' 3\" (190.5 cm)  Ideal Body Weight (IBW): 196 lbs (89 kg)    Admission Body Weight: 151 lb (68.5 kg)  Current Body Weight: 151 lb (68.5 kg), 77 % IBW.  Weight Source: Bed Scale  Current BMI (kg/m2): 18.9        Weight Adjustment For: Amputation  Total Adjusted Percentage (Calculated): 5.9  Adjusted Ideal Body Weight (lbs) (Calculated): 184.4 lbs  Adjusted Ideal Body Weight (kg) (Calculated): 83.82 kg  Adjusted % Ideal Body Weight (Calculated): 81.9  Adjusted BMI (kg/m2) (Calculated): 20  BMI Categories: Underweight (BMI less than 22) age over 72    Estimated Daily Nutrient Needs:  Energy Requirements Based On: Kcal/kg  Weight Used for Energy Requirements: Admission  Energy (kcal/day): 4179-2396 kcals based on 30-32 kcals/kg  Weight Used for Protein Requirements: Admission  Protein (g/day):  gm protein based on 1.4-1.6 gm/kg         Nutrition Diagnosis:   Increased nutrient needs related to other (comment) (healing) as evidenced by wounds    Nutrition Interventions:   Food and/or Nutrient Delivery: Continue NPO  Nutrition Education/Counseling: No recommendation at this time  Coordination of Nutrition Care: Continue to monitor while inpatient       Goals:     Goals: Meet at least 75% of estimated needs       Nutrition Monitoring and Evaluation:   Behavioral-Environmental Outcomes: None Identified  Food/Nutrient Intake Outcomes: None Identified  Physical Signs/Symptoms Outcomes: Biochemical Data, Chewing or Swallowing, GI Status, Nutrition Focused Physical Findings, Skin, Weight    Discharge Planning:     Too soon to determine     Wilfred Paulson, LD  [phone]

## 2022-11-21 NOTE — PROGRESS NOTES
[address]    Occupational Therapy Evaluation  Date: 22  Patient Name: Ange Lin       Room:   MRN: [id number]  Account: [de-identified]   : 1955  (78 y.o.) Gender: male     Discharge Recommendations:  Further Occupational Therapy is recommended upon facility discharge. Referring Practitioner: Anju Harris MD  Diagnosis: Acute respiratory failure with hypoxia and hypercapnia Additional Pertinent Hx: 79 y.o. male with past medical history of type 2 diabetes insulin-dependent diabetes mellitus, history of CVA, history of autism, history of CHF with reduced EF of 10 to 15%, history of bilateral BKA, left BKA on 2022, and had revision on 10/4/2022. Patient was sent from nursing home due to shortness of breath and concerns for aspiration pneumonia, patient is on tube feeding. Patient had an episode of vomiting and developed shortness of breath and therefore aspiration pneumonia suspected patient was noted to be hypoxic with pulse ox in 70s on room air. Treatment Diagnosis: Impaired self-care status    Past Medical History:  has a past medical history of 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 ([address] Acquired absence of right leg below knee ([address] Acute kidney failure ([address] Acute respiratory failure with hypoxia ([address] Cerebral infarction due to embolism ([address] Chronic kidney disease, unspecified, Chronic systolic (congestive) heart failure ([address] Depression, Heart failure ([address] Hypertension, Muscle weakness (generalized), Other symbolic dysfunctions, Peripheral vascular disease ([address] and Type 2 diabetes mellitus ([address] Past Surgical History:   has a past surgical history that includes Leg amputation below knee (Bilateral).     Restrictions  Restrictions/Precautions  Restrictions/Precautions: Fall Risk;General Precautions (PEG Tube, pt aphasic)  Position Activity Restriction  Other position/activity restrictions: Bilateral BKA Vitals  Vitals  Heart Rate: 93  Heart Rate Source: Monitor  BP: 127/81  BP Location: Right upper arm  BP Method: Automatic  Patient Position: Left side  MAP (Calculated): 96  Resp: 28  SpO2: 93 %  O2 Device: None (Room air)     Subjective  Comments: Okay for OT PT eval and treat per RN Clark Blood. RN informed OT/PT pt's hemoglobin 7.0, okay for sitting EOB. Subjective  Subjective: Pt has expressive aphasia and unable to verbalize. Social/Functional History  Social/Functional History  Type of Home: Facility ([facility])  Home Layout: One level  Home Access: Level entry  Additional Comments: Pt is unable to provide PLOF and home set-up. Objective  Orientation  Overall Orientation Status: Impaired  Cognition  Overall Cognitive Status: Exceptions  Arousal/Alertness: Inconsistent responses to stimuli, Delayed responses to stimuli  Following Commands: Inconsistently follows commands  Safety Judgement: Decreased awareness of need for safety  Initiation: Requires cues for all  Sequencing: Requires cues for all        ADL  Feeding: Dependent/Total (Tube feeds)  Grooming: Stand by assistance  Grooming Skilled Clinical Factors: Pt washed his face with cues for initiating movements  UE Bathing: Minimal assistance  LE Bathing: Maximum assistance  UE Dressing: Minimal assistance  LE Dressing: Dependent/Total  Toileting: Dependent/Total  Toileting Skilled Clinical Factors: Noted to have loose stool was returned to bed. Total assist for hoda care, rolling from side-to-side to complete  Additional Comments: ADL scores based on skilled observation and clinical reasoning, unless otherwise noted.  Pt is limited due to cognitive deficits, impaired balance, and low activity tolerance, impacting safety and independence with self care         UE Function  LUE AROM (degrees)  LUE AROM : WFL  Left Hand AROM (degrees)  Left Hand AROM: WFL  Tone LUE  LUE Tone: Normotonic  LUE Strength  L Hand General: 3/5  LUE Strength Comment: Overall 3/5    RUE AROM (degrees)  RUE AROM : Exceptions  RUE General AROM: Shoulder flexion approx 50%; otherwise WFL  Right Hand PROM (degrees)  Right Hand PROM: WFL  Right Hand AROM (degrees)  Right Hand General AROM: Flexion contractures noted  Tone RUE  RUE Tone: Normotonic  RUE Strength  R Hand General: 3-/5  RUE Strength Comment: Overall 3-/5         Fine Motor Skills/Coordination  Coordination  Movements Are Fluid And Coordinated: No  Coordination and Movement Description: Fine motor impairments, Gross motor impairments, Right UE, Left UE              Bed Mobility  Bed mobility  Rolling to Left: Maximum assistance  Rolling to Right: Maximum assistance  Supine to Sit: Moderate assistance, 2 Person assistance  Sit to Supine: Moderate assistance, 2 Person assistance  Scooting: Moderate assistance, 2 Person assistance  Bed Mobility Comments: HOB slightly elevated with use of hand rails. Pt sat EOB for approx. 2-3 minutes with noted heavy posterior lean. Initially R lateral lean with max cues to correct, however pt noted to have a L lateral lean. Balance  Balance  Sitting Balance: Dependent/Total (modA x2)  Standing Balance: Unable to assess(comment)       Transfers  Transfers  Transfer Comments: DANICA d/t safety concerns         Assessment  Assessment  Performance deficits / Impairments: Decreased functional mobility , Decreased ADL status, Decreased ROM, Decreased strength, Decreased safe awareness, Decreased cognition, Decreased endurance, Decreased balance, Decreased high-level IADLs, Decreased fine motor control, Decreased coordination  Treatment Diagnosis: Impaired self-care status  Prognosis: Fair  Decision Making: High Complexity  Discharge Recommendations: Patient would benefit from continued therapy after discharge    Activity Tolerance  Activity Tolerance: Treatment limited secondary to decreased cognition, Patient limited by fatigue    Safety Devices  Type of Devices:  All fall risk precautions in place, Bed alarm in place, Call light within reach, Patient at risk for falls, Left in bed, Nurse notified    Patient Education  Patient Education  Education Given To: Patient  Education Provided: Role of Therapy, Plan of Care, Precautions  Education Method: Verbal  Barriers to Learning: Cognition  Education Outcome: Continued education needed      Functional Outcome Measures  AM-PAC Daily Activity Inpatient   How much help for putting on and taking off regular lower body clothing?: Total  How much help for Bathing?: Total  How much help for Toileting?: Total  How much help for putting on and taking off regular upper body clothing?: A Little  How much help for taking care of personal grooming?: A Little  How much help for eating meals?: A Little  AM-[facility] Inpatient Daily Activity Raw Score: 12  AM-PAC Inpatient ADL T-Scale Score : 30.6  ADL Inpatient CMS 0-100% Score: 66.57  ADL Inpatient CMS G-Code Modifier : CL       Goals     Short Term Goals  Time Frame for Short Term Goals: By discharge  Short Term Goal 1: Pt will follow 75% of simple 1-step instructions with Mod cues to improve independence with self-care. Short Term Goal 2: Pt will perform bed mobility with modA to facilitate participation in self care/reduce risk of pressure ulcers  Short Term Goal 3: Pt will tolerate sitting EOB 5+ minutes, modA, during functional activity of choice  Short Term Goal 4: Pt will participate in 15-25 minutes of therapeutic exercise/functional activity to increase safety and independence with self-care and mobility.     Plan  Occupational Therapy Plan  Times Per Week: 3-5  Current Treatment Recommendations: Strengthening, ROM, Balance training, Functional mobility training, Endurance training, Safety education & training, Patient/Caregiver education & training, Equipment evaluation, education, & procurement, Positioning, Self-Care / ADL      OT Individual Minutes  OT Individual Minutes  Time In: 7951  Time Out: 0184  Minutes: 24  Time Code Minutes   Timed Code Treatment Minutes: 10 Minutes        Electronically signed by Sudhir Hanks OT on 11/21/22 at 1:49 PM EST

## 2022-11-21 NOTE — PROGRESS NOTES
[facility]    PROGRESS NOTE             11/21/2022    9:28 AM    Name:   Tess Lee  MRN:     [id number]     Acct:      [de-identified]   Room:   2009/2009-01  IP Day:  3  Admit Date:  11/18/2022 11:46 AM    PCP:  PROVIDER Modesto Saldaña MD  Code Status:  Full Code    Subjective:     C/C:   Chief Complaint   Patient presents with    Shortness of Breath     Interval History Status: improved. The patient was seen and examined at the bedside. Successfully extubated yesterday. Vitals signs stable. Saturating 96% on 2 L. Hgb dropped to 7.0. Permission obtained from [facility] to start blood transfusion. Brief History:      The patient is a 79 y.o. 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 The nursing staff had concerns for aspiration. His oxygen saturation dropped to the 70s. The patient was transferred to [facility] for evaluation. In the ED, the patient was tachycardic (106), tachypneic (RR 25), and hypoxic (02 saturation in 70s). The patient was started on a 100% non re-breather. The patient was then weaned down to a 10 L salter (02 saturation 98%). The salter was then decreased to 8L. Chest x-ray showed bibasilar pulmonary opacities suggesting pneumonia. The patient was started on Cefepime and Clindamycin. EKG showed sinus tachycardia with 1st degree AV block, left anterior fascicular block, and anterolateral infarct (age undetermined). WBCS were elevated at 16.7. Hgb was 11.3. ABGs showed pH 7.405, pC02 61.4, p02 81.2, Hc03 38.4. Blood cultures were drawn and results are pending. The patient was given normal saline bolus.  The patient was admitted for acute hypoxic, hypercapnic respiratory failure secondary to suspected bilateral aspiration pneumonia    Review of Systems:     Review of Systems   Constitutional:  Negative for activity change, chills and fever. HENT:  Negative for congestion and rhinorrhea. Respiratory:  Negative for cough, shortness of breath, wheezing and stridor. Cardiovascular:  Negative for chest pain, palpitations and leg swelling. Gastrointestinal:  Negative for abdominal distention, abdominal pain, constipation, diarrhea, nausea and vomiting. Endocrine: Negative for cold intolerance and heat intolerance. Musculoskeletal:  Negative for arthralgias and myalgias. Skin:  Negative for pallor and rash. Neurological:  Negative for dizziness, tremors, syncope, light-headedness and headaches. Psychiatric/Behavioral:  Negative for agitation and confusion. Medications: Allergies:     Allergies   Allergen Reactions    Penicillins Rash       Current Meds:   Scheduled Meds:    insulin glargine  10 Units SubCUTAneous BID    insulin lispro  0-4 Units SubCUTAneous Nightly    insulin lispro  0-16 Units SubCUTAneous Q4H    sodium chloride flush  5-40 mL IntraVENous 2 times per day    cefepime  1,000 mg IntraVENous Q12H    [Held by provider] bumetanide  1 mg Per G Tube Daily    [Held by provider] carvedilol  6.25 mg Per G Tube BID    [Held by provider] furosemide  40 mg Per G Tube Daily    [Held by provider] gabapentin  200 mg Per G Tube Nightly    [Held by provider] sacubitril-valsartan  1 tablet Per G Tube BID    [Held by provider] spironolactone  25 mg Per G Tube Daily    sertraline  50 mg Per G Tube Daily    albumin human  25 g IntraVENous Q8H    clindamycin (CLEOCIN) IV  600 mg IntraVENous Q8H    heparin (porcine)  4,000 Units IntraVENous Once    pantoprazole (PROTONIX) 40 mg injection  40 mg IntraVENous Q12H     Continuous Infusions:    sodium chloride      sodium chloride      sodium chloride      dextrose sodium chloride 50 mL/hr at 22 0725    vasopressin (Septic Shock) infusion Stopped (22 1148)    norepinephrine (LEVOPHED) infusion Stopped (22 1009)    [Held by provider] heparin (PORCINE) Infusion Stopped (22 0604)     PRN Meds: sodium chloride, sodium chloride, sodium chloride flush, sodium chloride, ondansetron **OR** ondansetron, polyethylene glycol, acetaminophen **OR** acetaminophen, potassium chloride, glucose, dextrose bolus **OR** dextrose bolus, glucagon (rDNA), dextrose, HYDROcodone-acetaminophen, [Held by provider] heparin (porcine), [Held by provider] heparin (porcine)    Data:     Past Medical History:   has a past medical history of 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 ([facility] Acquired absence of right leg below knee ([facility] Acute kidney failure ([facility] Acute respiratory failure with hypoxia ([facility] Cerebral infarction due to embolism ([address] Chronic kidney disease, unspecified, Chronic systolic (congestive) heart failure ([address] Depression, Heart failure ([address] Hypertension, Muscle weakness (generalized), Other symbolic dysfunctions, Peripheral vascular disease ([address] and Type 2 diabetes mellitus ([facility] Social History:        Family History: History reviewed. No pertinent family history. Vitals:  /81   Pulse 92   Temp 100.4 °F (38 °C) (Axillary)   Resp 18   Ht 6' 3\" (1.905 m)   Wt 151 lb 10.8 oz (68.8 kg)   SpO2 96%   BMI 18.96 kg/m²   Temp (24hrs), Av °F (37.2 °C), Min:98.2 °F (36.8 °C), Max:100.4 °F (38 °C)    Recent Labs     22  0725 22  1122 22  1624 22   POCGLU 181* 190* 168* 165*       I/O(24Hr): Intake/Output Summary (Last 24 hours) at 2022 0928  Last data filed at 2022 0725  Gross per 24 hour   Intake 2038. 2 ml   Output 1105 ml   Net 933.2 ml       Labs:  [unfilled]    Lab Results   Component Value Date/Time    SPECIAL RIGHT HAND 2022 11:56 AM     Lab Results   Component Value Date/Time    CULTURE NORMAL RESPIRATORY MOIRA MODERATE GROWTH 11/18/2022 06:23 PM       [unfilled]    Radiology:    US RENAL LIMITED    Result Date: 11/19/2022  EXAMINATION: ULTRASOUND OF THE KIDNEYS 11/19/2022 6:22 am COMPARISON: None. HISTORY: ORDERING SYSTEM PROVIDED HISTORY: EDELMIRA TECHNOLOGIST PROVIDED HISTORY: EDELMIRA FINDINGS: The right kidney measures 10.9 cm in length and the left kidney measures 10.9 cm in length. Bilateral renal cysts measuring measuring up to 2.5 cm on the right and 3.1 cm on the left. Kidneys otherwise demonstrate normal cortical echogenicity. No hydronephrosis or intrarenal stones. No focal lesions. Benign bilateral renal cysts     XR CHEST PORTABLE    Result Date: 11/20/2022  EXAMINATION: ONE XRAY VIEW OF THE CHEST 11/20/2022 2:53 am COMPARISON: 11/19/2022 HISTORY: ORDERING SYSTEM PROVIDED HISTORY: ETT placement TECHNOLOGIST PROVIDED HISTORY: ETT placement Reason for Exam: ETT placement Additional signs and symptoms: ETT placement Relevant Medical/Surgical History: ETT placement FINDINGS: There is an ET tube with the tip 5.3 cm above the amparo. There is an NG tube overlying the left upper quadrant, however the most distal portion of the tube is not visualized. Cardiac size is enlarged. Stable bibasal infiltrates with underlying left basilar atelectasis. .  The pulmonary vascularity is stable. No pneumothorax. No significant pleural effusions identified . Pink Piero Stable chest.     XR CHEST PORTABLE    Result Date: 11/19/2022  EXAMINATION: ONE XRAY VIEW OF THE CHEST 11/19/2022 5:49 am COMPARISON: 11/18/2022 HISTORY: ORDERING SYSTEM PROVIDED HISTORY: ETT placement TECHNOLOGIST PROVIDED HISTORY: ETT placement Reason for Exam: ETT placement Additional signs and symptoms: ETT placement Relevant Medical/Surgical History: ETT placement FINDINGS: Endotracheal tube approximately 6 cm above the amparo. Enteric catheter courses into the abdomen. Heart size stable. Bibasilar pulmonary opacities. No pneumothorax. No new airspace disease. Persistent bibasilar pulmonary opacities could represent atelectasis or pneumonia     XR CHEST PORTABLE    Result Date: 11/18/2022  EXAMINATION: ONE XRAY VIEW OF THE CHEST 11/18/2022 6:00 pm COMPARISON: Radiograph performed earlier the same day HISTORY: ORDERING SYSTEM PROVIDED HISTORY: intubated TECHNOLOGIST PROVIDED HISTORY: intubated Reason for Exam: Intubation FINDINGS: Endotracheal tube terminates 7 cm above the amparo. Enteric tube is below the diaphragm. Cardiomediastinal silhouette is unchanged in size. Aortic atherosclerosis. No large pleural effusion or pneumothorax. There is patchy opacity in the right lung base. There is linear scarring/atelectasis in the left lung base. 1. Lines and tubes in expected position. 2. Bibasilar pulmonary opacities are again noted. XR CHEST PORTABLE    Result Date: 11/18/2022  EXAMINATION: ONE XRAY VIEW OF THE CHEST 11/18/2022 11:58 am COMPARISON: 09/27/2022 HISTORY: ORDERING SYSTEM PROVIDED HISTORY: sob TECHNOLOGIST PROVIDED HISTORY: sob Reason for Exam: sob FINDINGS: Stable cardiomegaly. Bibasilar pulmonary opacities noted. No pulmonary vascular congestion or edema. No pneumothorax. Bibasilar pulmonary opacities suggesting pneumonia         Physical Examination:        Physical Exam  Constitutional:       Appearance: Normal appearance. HENT:      Head: Normocephalic and atraumatic. Nose: No congestion or rhinorrhea. Eyes:      Extraocular Movements: Extraocular movements intact. Conjunctiva/sclera: Conjunctivae normal.      Pupils: Pupils are equal, round, and reactive to light. Cardiovascular:      Rate and Rhythm: Normal rate and regular rhythm. Pulses: Normal pulses. Heart sounds: Normal heart sounds. No murmur heard. No friction rub. No gallop. Pulmonary:      Effort: Pulmonary effort is normal. No respiratory distress. Breath sounds: Rhonchi present. No wheezing or rales.    Abdominal: General: Abdomen is flat. Bowel sounds are normal. There is no distension. Tenderness: There is no abdominal tenderness. There is no guarding. Musculoskeletal:      Comments: Bilateral below-knee amputation   Skin:     Capillary Refill: Capillary refill takes less than 2 seconds. Coloration: Skin is not pale. Findings: No bruising. Neurological:      Mental Status: He is alert. Sensory: No sensory deficit. Motor: No weakness. Psychiatric:         Mood and Affect: Mood normal.         Assessment:        Primary Problem  Acute respiratory failure with hypoxia and hypercapnia ([facility]    Active Hospital Problems    Diagnosis Date Noted    Coffee ground emesis [K92.0] 11/20/2022     Priority: Medium    Rectal bleeding [K62.5] 11/20/2022     Priority: Medium    Acute respiratory failure with hypoxia and hypercapnia ([facility]) [J96.01, J96.02] 11/18/2022     Priority: Medium    Aspiration pneumonia, unspecified aspiration pneumonia type, unspecified laterality, unspecified part of lung ([facility] [J69.0] 11/18/2022     Priority: Medium       Plan:        Acute hypoxic hypercapnic respiratory failure 2/2 to bilateral aspiration pneumonia   -Patient was hypoxic on presentation (02 sat in 70s); currently intubated on ventilator; currently saturating 100%;  Fi02 40.   -CXR 11/18: bibasilar pulmonary opacities suggesting pneumonia  -CXR 11/19: persistent bibasilar pulmonary opacities could represent atelectasis vs. Pneumonia  -CXR 11/20: stable bibasilar infiltrates with underlying left basilar atelectasis  -CT scan of chest once medically stable  -WBCS elevated at 16.7; today 8.1  -ABG on admission: pH 7.405, pC02 61.4, p02 81.2, HC03 38.4  -ABGs today: pH 7.412, pC02 49.7, p02 110., HC03 31.7  -Cefepime and Clindamycin given in ED; continue IV Cefepime and Clindamycin   -Off Propfol, Levophed, vasopressin  -Patient is off heparin drip due to clots found in stool and hgb drop to 7.0  -Blood cultures: no growth at 1 day  -Respiratory culture : normal amanda   -Pro-calcitonin 0.27  -Lactate sepsis pending   -Aspiration precautions  -Pulmonology/critical care on board      Acute on Chronic kidney disease  -Creatinine on admission 2.54 (baseline 1.31); Cr today 1.84 (trending down)   -Urine Creatinine 40.6, random urine sodium 29  -Renal ultrasound: benign bilateral renal cysts.     -Nephrology consulted (continue IV fluids at 50; monitor urine output)       DM2  -Glucose on admission: 186; today 165  -start high dose sliding scale; continue 10 U lantus  -POCT x4  -initiate hypoglycemic protocol  -Continue Gabapentin 200 mg nightly      Hyponatremia-resolved  -Na on admission 132; today 145  -Continue to monitor   -Nephrology consulted      History of embolic stroke  -Hold Warfarin 10 mg po daily (unclear if Warfarin is for severe peripheral artery disease or for possible history of atrial fibrillation)  -Patient is off heparin drip due to clots found in stool and hgb drop to 7.0  -monitor H + H   -Telemetry continuous monitoring      Anemia  -Hgb on admission 11.3; today 7.0  -H and H q 8 hours  -Hold heparin drip  -Continue Protonix 40 mg IV q 12   -GI consulted  (questionable stress ulcers, Continue IV PPI, transfuse for Hgb < 7, possible EGD and colonoscopy pending pulmonary clearance)     Elevated troponin   -Troponin on admission 160; 263 9/28  -EKG: sinus tachycardia with 1st degree AV block, anterior lateral infarct age undetermined   -Heparin on hold      CHF (EF (10-15%)  -EKG: sinus tachycardia with 1st degree AV block, left anterior fascicular block, and anterolateral infarct (age undetermined)  - Echo 9/28/22: EF < 20; RV function appears impaired; AV is sclerotic  -Hold Bumex  -Hold Coreg 6.25 mg BID  -Hold Lasix 40 mg daily  -Hold Entresto BID  -Hold Aldactone 25 mg daily       Depression/anxiety  -Continue Zoloft 50 mg daily     DVT prophylaxis: Hold home Warfarin 10 mg po daily; Hold Heparin   Code: Full code  Diet: NPO  PT/OT/SW consulted    Neema Martin MD  11/21/2022  9:28 AM   Attending Physician Statement  I have discussed the care of Ange Lin and I have examined the patient myselft and taken ros and hpi , including pertinent history and exam findings,  with the resident. I have reviewed the key elements of all parts of the encounter with the resident. I agree with the assessment, plan and orders as documented by the resident. Spent 35 minutes in reviewing data/medicines/talking to patient/family,  explaining and answering all the questions.     80-year-old gentleman admitted to ICU for acute hypoxic and hypercapnic respiratory failure, patient s/p extubation  Septic shock secondary to aspiration pneumonia bilateral  Bilateral below-knee amputation due to severe peripheral vascular disease,  Severe cardiomyopathy with ejection fraction of 10 to 15%, does not have AICD in place, seen by cardiology in the past,  Acute on chronic kidney disease, creatinine improving  Thrombocytopenia getting worse, HIT antibodies negative, off heparin at this time  Blood pressure is better still on Levophed  Drop in hemoglobin, RN noticed blood clots in the stools  On Protonix twice a day  Monitoring H&H  GI on board,         Electronically signed by Chela Redd MD

## 2022-11-21 NOTE — PROGRESS NOTES
Writer spoke with patients POA to discuss patients current health status. Writer explained the patient has a drop in Hgb below 7 and requires blood transfusion. The patients brother is agreeable to blood transfusion and gives permission to proceed with the blood transfusion.

## 2022-11-21 NOTE — CARE COORDINATION
ONGOING DISCHARGE PLAN:    Spoke with patient's Brother, [address] discharge plan and he confirms that plan is still to return to [place] or [place], when medically stable. Pt. Remains on IV Clinda/Cefepime & IV Albumin. CR 1.84, Nephro following. Pt. Was having Bloody stools, GI, on Board, Needs Pulm Clearance for EGD, if needed. HGB today 7.0/8.9. PT/OT on board. Will continue to follow for additional discharge needs.     Electronically signed by Gilberto Victor RN on 11/21/2022 at 4:32 PM

## 2022-11-21 NOTE — PROGRESS NOTES
ICU Progress Note (Non-Vent)  [facility]    Patient - Logan Childs,  Age - 79 y.o.    - 1955      Room Number -    MRN -  [id number]   Acct # - [de-identified]  Date of Admission -  2022 11:46 AM    Events of Past 24 Hours   Appears to be sedated without any issues yesterday, alert and oriented    Vitals    height is 6' 3\" (1.905 m) and weight is 151 lb 10.8 oz (68.8 kg). His axillary temperature is 100.4 °F (38 °C). His blood pressure is 121/81 and his pulse is 92. His respiration is 18 and oxygen saturation is 96%. Temperature Range: Temp: 100.4 °F (38 °C) Temp  Av °F (37.2 °C)  Min: 98.2 °F (36.8 °C)  Max: 100.4 °F (38 °C)  BP Range:  Systolic (95BGW), ECM:209 , Min:95 , UMJ:350     Diastolic (66XEY), FGS:23, Min:62, Max:102    Pulse Range: Pulse  Av.8  Min: 92  Max: 105  Respiration Range: Resp  Av.8  Min: 12  Max: 32  Current Pulse Ox[de-identified]  SpO2: 96 %  24HR Pulse Ox Range:  SpO2  Av.2 %  Min: 91 %  Max: 98 %  Oxygen Amount and Delivery: O2 Flow Rate (L/min): 2 L/min    Wt Readings from Last 3 Encounters:   22 151 lb 10.8 oz (68.8 kg)   22 156 lb 8.4 oz (71 kg)   22 150 lb (68 kg)     I/O     Intake/Output Summary (Last 24 hours) at 2022 0933  Last data filed at 2022 0725  Gross per 24 hour   Intake 2038. 2 ml   Output 1105 ml   Net 933.2 ml     DRAIN/TUBE OUTPUT       Invasive Lines   ICP PRESSURE RANGE  No data recorded  CVP PRESSURE RANGE  No data recorded      Medications      insulin glargine  10 Units SubCUTAneous BID    insulin lispro  0-4 Units SubCUTAneous Nightly    insulin lispro  0-16 Units SubCUTAneous Q4H    sodium chloride flush  5-40 mL IntraVENous 2 times per day    cefepime  1,000 mg IntraVENous Q12H    [Held by provider] bumetanide  1 mg Per G Tube Daily    [Held by provider] carvedilol  6.25 mg Per G Tube BID [Held by provider] furosemide  40 mg Per G Tube Daily    [Held by provider] gabapentin  200 mg Per G Tube Nightly    [Held by provider] sacubitril-valsartan  1 tablet Per G Tube BID    [Held by provider] spironolactone  25 mg Per G Tube Daily    sertraline  50 mg Per G Tube Daily    albumin human  25 g IntraVENous Q8H    clindamycin (CLEOCIN) IV  600 mg IntraVENous Q8H    heparin (porcine)  4,000 Units IntraVENous Once    pantoprazole (PROTONIX) 40 mg injection  40 mg IntraVENous Q12H     sodium chloride, sodium chloride, sodium chloride flush, sodium chloride, ondansetron **OR** ondansetron, polyethylene glycol, acetaminophen **OR** acetaminophen, potassium chloride, glucose, dextrose bolus **OR** dextrose bolus, glucagon (rDNA), dextrose, HYDROcodone-acetaminophen, [Held by provider] heparin (porcine), [Held by provider] heparin (porcine)  IV Drips/Infusions   sodium chloride      sodium chloride      sodium chloride      dextrose      sodium chloride 50 mL/hr at 11/21/22 0725    vasopressin (Septic Shock) infusion Stopped (11/19/22 1148)    norepinephrine (LEVOPHED) infusion Stopped (11/20/22 1009)    [Held by provider] heparin (PORCINE) Infusion Stopped (11/20/22 0604)       Diet/Nutrition   Diet NPO  ADULT TUBE FEEDING; PEG; Renal Formula; Continuous; 15; Yes; 15; Q 4 hours; 45; 100; Q 6 hours    Exam      Constitutional - Alert, arousable  General Appearance  ill appearing  HEENT -normocephalic, atraumatic. PERRLA  Lungs - Chest expands equally, no wheezes, rales or rhonchi. Cardiovascular - Heart sounds are normal.  normal rate and rhythm regular, no murmur, gallop or rub. Abdomen - soft, nontender, nondistended, no masses or organomegaly  Neurologic - CN II-XII are grossly intact.  There are no focal motor deficits  Skin - no bruising or bleeding  Extremities - no cyanosis, clubbing or edema: Bilateral BKA    Lab Results   CBC     Lab Results   Component Value Date/Time    WBC 8.1 11/21/2022 04:23 AM    RBC 2.30 11/21/2022 04:23 AM    HGB 7.0 11/21/2022 04:23 AM    HCT 22.2 11/21/2022 04:23 AM     11/21/2022 04:23 AM    MCV 96.5 11/21/2022 04:23 AM    MCH 30.3 11/21/2022 04:23 AM    MCHC 31.4 11/21/2022 04:23 AM    RDW 16.6 11/21/2022 04:23 AM    LYMPHOPCT 4 11/21/2022 04:23 AM    MONOPCT 6 11/21/2022 04:23 AM    BASOPCT 0 11/21/2022 04:23 AM    MONOSABS 0.49 11/21/2022 04:23 AM    LYMPHSABS 0.32 11/21/2022 04:23 AM    EOSABS 0.00 11/21/2022 04:23 AM    BASOSABS 0.00 11/21/2022 04:23 AM       BMP   Lab Results   Component Value Date/Time     11/21/2022 04:23 AM    K 3.7 11/21/2022 04:23 AM     11/21/2022 04:23 AM    CO2 23 11/21/2022 04:23 AM    BUN 91 11/21/2022 04:23 AM    CREATININE 1.84 11/21/2022 04:23 AM    GLUCOSE 192 11/21/2022 04:23 AM       LFTS  Lab Results   Component Value Date/Time    ALKPHOS 132 11/18/2022 12:25 PM    ALT 19 11/18/2022 12:25 PM    AST 18 11/18/2022 12:25 PM    PROT 7.6 11/18/2022 12:25 PM    BILITOT 0.3 11/18/2022 12:25 PM    LABALBU 3.6 11/18/2022 12:25 PM       ABG ABGs:   Lab Results   Component Value Date/Time    PHART 7.412 11/20/2022 04:22 AM    PO2ART 110.0 11/20/2022 04:22 AM    UXO0FDQ 49.7 11/20/2022 04:22 AM       Lab Results   Component Value Date/Time    MODE PRVC 11/20/2022 04:22 AM         INR  Recent Labs     11/18/22  1145 11/18/22  1945   PROTIME 16.3* 18.2*   INR 1.3 1.5       APTT  Recent Labs     11/18/22  1145 11/18/22  1945   APTT 33.8 29.7       Lactic Acid  Lab Results   Component Value Date/Time    LACTA 0.6 11/18/2022 12:51 PM        BNP   No results for input(s): BNP in the last 72 hours.      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  Cardiomyopathy EF 10 to 15%  Type 2 diabetes  History of embolic stroke  Aphasia  Acute on chronic kidney disease, did have dialysis while at [address] status appears to be at his baseline, understands what I am saying but has aphasia  Wean off oxygen keep saturations greater than 88%  Off pressors now  No objection to eating if he can pass a swallow study  Renal function has improved  Carla on clindamycin and cefepime  Transfuse if hemoglobin less than 7, we are monitoring closely, GI note reviewed  Blood sugars reasonably controlled  He needs central line due to poor peripheral vascular access  Did discuss with his brother when I updated him about CODE STATUS and he will discuss with Christen Urbano what his true wishes are in terms of resuscitative status    Critical Care Time   0 min    Electronically signed by Brad Robert MD on 11/21/2022 at 9:33 AM [employer] and print the 'Prescriptions from this Visit' Report

## 2022-11-21 NOTE — PROGRESS NOTES
NEPHROLOGY PROGRESS NOTE    Patient :  Michelle De Leon; 79 y.o. MRN# [id number]  Location:  2009/2009-01  Attending:  Kath Tejeda MD  Admit Date:  11/18/2022   Hospital Day: 3    Reason for consultation: Management of acute kidney injury. Consulting physician: Kath Tejeda MD.    Interval history: Patient was seen and examined today in the intensive care unit. He was extubated yesterday. He is awake but aphasic. He does not appear to be in acute respiratory distress. Laboratory study results were reviewed and BUN/creatinine continue to trend down. He is nonoliguric. History of Present Illness: This is a 79 y.o. 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 Patient had an episode of vomiting and developed shortness of breath and therefore aspiration pneumonia suspected patient was noted to be hypoxic with pulse ox in 70s on room air.   Patient initially was placed on nonrebreather and then patient was intubated last night currently on ventilator  Patient also noted to be hypotensive, on Levophed  Labs reviewed, serum creatinine on admission BUN was noted to be 139 and creatinine 2.5 mg/dL, patient's last serum creatinine on November 11, 2022 was 1.2 mg/dL  Patient is nonoliguric urine output 650 mL overnight    Current Medications: 0.9 % sodium chloride infusion, PRN  0.9 % sodium chloride infusion, PRN  insulin glargine (LANTUS) injection vial 10 Units, BID  insulin lispro (HUMALOG) injection vial 0-4 Units, Nightly  insulin lispro (HUMALOG) injection vial 0-16 Units, Q4H  sodium chloride flush 0.9 % injection 5-40 mL, 2 times per day  sodium chloride flush 0.9 % injection 5-40 mL, PRN  0.9 % sodium chloride infusion, PRN  ondansetron (ZOFRAN-ODT) disintegrating tablet 4 mg, Q8H PRN   Or  ondansetron (ZOFRAN) injection 4 mg, Q6H PRN  polyethylene glycol (GLYCOLAX) packet 17 g, Daily PRN  acetaminophen (TYLENOL) tablet 650 mg, Q6H PRN   Or  acetaminophen (TYLENOL) suppository 650 mg, Q6H PRN  potassium chloride 10 mEq/100 mL IVPB (Peripheral Line), PRN  cefepime (MAXIPIME) 1,000 mg in sodium chloride 0.9 % 50 mL IVPB (mini-bag), Q12H  glucose chewable tablet 16 g, PRN  dextrose bolus 10% 125 mL, PRN   Or  dextrose bolus 10% 250 mL, PRN  glucagon (rDNA) injection 1 mg, PRN  dextrose 10 % infusion, Continuous PRN  [Held by provider] bumetanide (BUMEX) tablet 1 mg, Daily  [Held by provider] carvedilol (COREG) tablet 6.25 mg, BID  [Held by provider] furosemide (LASIX) tablet 40 mg, Daily  [Held by provider] gabapentin (NEURONTIN) capsule 200 mg, Nightly  HYDROcodone-acetaminophen (NORCO) 5-325 MG per tablet 1 tablet, Q6H PRN  [Held by provider] sacubitril-valsartan (ENTRESTO) 24-26 MG per tablet 1 tablet, BID  [Held by provider] spironolactone (ALDACTONE) tablet 25 mg, Daily  sertraline (ZOLOFT) tablet 50 mg, Daily  0.9 % sodium chloride infusion, Continuous  vasopressin 20 Units in dextrose 5 % 100 mL infusion, Continuous  clindamycin (CLEOCIN) 600 mg in dextrose 5 % 50 mL IVPB, Q8H  norepinephrine (LEVOPHED) 16 mg in dextrose 5 % 250 mL infusion, Continuous  heparin (porcine) injection 4,000 Units, Once  [Held by provider] heparin (porcine) injection 4,000 Units, PRN  [Held by provider] heparin (porcine) injection 2,000 Units, PRN  [Held by provider] heparin 25,000 units in dextrose 5% 250 mL (premix) infusion, Continuous  pantoprazole (PROTONIX) 40 mg in sodium chloride (PF) 0.9 % 10 mL injection, Q12H    Objective:  CURRENT TEMPERATURE:  Temp: 97.8 °F (36.6 °C)  MAXIMUM TEMPERATURE OVER 24HRS:  Temp (24hrs), Av.8 °F (37.1 °C), Min:97.8 °F (36.6 °C), Max:100.4 °F (38 °C)    CURRENT RESPIRATORY RATE:  Resp: 16  CURRENT PULSE:  Heart Rate: 95  CURRENT BLOOD PRESSURE:  BP: 121/81  24HR BLOOD PRESSURE RANGE:  Systolic (18FAG), DCW:339 , Min:95 , IOA:136   ; Diastolic (93DCA), WAC:72, Min:62, Max:102    24HR INTAKE/OUTPUT:    Intake/Output Summary (Last 24 hours) at 2022 1023  Last data filed at 2022 0725  Gross per 24 hour   Intake 2038. 2 ml   Output 1105 ml   Net 933.2 ml       Patient Vitals for the past 96 hrs (Last 3 readings):   Weight   22 0545 151 lb 10.8 oz (68.8 kg)   22 1515 150 lb 5.7 oz (68.2 kg)   22 1202 155 lb (70.3 kg)       Physical Exam:  GENERAL APPEARANCE: Awake and alert but with expressive aphasia. HEAD: Normocephalic and atraumatic  EYES:  EOMI. Not pale, anicteric   CARDIAC: Normal S1 and S2. No S3, S4 or murmurs. Rhythm is regular. LUNGS:diminished breath sounds. Coarse breath sounds no accessory muscle use  ABDOMEN: With PEG tube in situ. Normal bowel sounds. EXTREMITIES: No edema.   Bilateral BKA  NEURO: Nonfocal    Labs:   CBC:  Recent Labs     22  0557 22  2104 22  0547 22  1201 22  1810 22  0301 22  0423   WBC 17.9*  --  13.7*  --   --   --  8.1   RBC 2.74*  --  2.49*  --   --   --  2.30*   HGB 8.4*   < > 7.6*   < > 7.5* 7.2* 7.0*   HCT 25.6*   < > 23.5*   < > 23.4* 23.2* 22.2*   MCV 93.2  --  94.3  --   --   --  96.5   MCH 30.7  --  30.5  --   --   --  30.3   MCHC 32.9  --  32.3  --   --   --  31.4   RDW 16.6*  --  16.5*  --   --   --  16.6*     --  147*  --   --   --  113*   MPV 10.1  --  9.9  --   --   --  9.8    < > = values in this interval not displayed. BMP:   Recent Labs     11/19/22  0557 11/20/22  0547 11/21/22  0423   * 141 145*   K 4.8 3.8 3.7   CL 90* 98 105   CO2 32* 30 23   * 116* 91*   CREATININE 2.48* 2.05* 1.84*   GLUCOSE 309* 241* 192*   CALCIUM 8.8 8.7 8.9        Phosphorus:  No results for input(s): PHOS in the last 72 hours. Magnesium:   Recent Labs     11/18/22  1140 11/18/22  1225   MG PLEASE DISREGARD RESULTS. SPECIMEN CONTAMINATED. 4.1*       Albumin:   Recent Labs     11/18/22  1140 11/18/22  1225   LABALBU PLEASE DISREGARD RESULTS. SPECIMEN CONTAMINATED. 3.6     \  Recent Labs     11/18/22  1225   PROT 7.6       Recent Labs     11/18/22  1650   VÍCTOR 29        Urine Creatinine:    Recent Labs     11/18/22  1650   LABCREA 40.6       Urinalysis:    Recent Labs     11/18/22  1650   NITRU NEGATIVE   COLORU Yellow   PHUR 5.0   WBCUA 3 to 5   RBCUA 0 TO 2   BACTERIA FEW*   SPECGRAV 1.015   LEUKOCYTESUR NEGATIVE   UROBILINOGEN Normal   BILIRUBINUR NEGATIVE   GLUCOSEU NEGATIVE   KETUA NEGATIVE   AMORPHOUS 1+*       Assessment/plan:    1. Acute kidney injury on chronic kidney disease stage III acute kidney injury in this patient is most consistent with ischemic acute tubular necrosis secondary to hypotension prerenal azotemia from diuretics, Entresto and Aldactone. He is nonoliguric [Urine output 1.1 L in the past 24 hours]. Plan: There are no indications for acute hemodialysis at this time. Continue IV fluid 0.9 NS at 50 mill per hour. Monitor urine output closely. Continue to hold diuretics, Entresto and spironolactone  Basic metabolic profile daily. 2.  Hypernatremia consistent with cellular dehydration with estimated free water deficit approximately 1.7 L.    3.  Heart failure with reduced ejection fraction [HFrEF with LVEF 15 to 20%] - patient with need diuretics in the long run. Prognosis is guarded.     Electronically signed by Dalia Sears MD on 11/21/2022 at 10:23 AM

## 2022-11-21 NOTE — PROGRESS NOTES
[facility]    Gastroenterology Daily Progress Note      Patient:   Awais Hawley   :    1955   Facility:   [facility]  Date:     2022  Consultant:   BARBARA Higgins - CNP, CNP      SUBJECTIVE  79 y.o. male admitted 2022 with Acute respiratory failure with hypoxia and hypercapnia (HCC) [J96.01, J96.02]  Pneumonia of both lower lobes due to infectious organism [J18.9]  Aspiration pneumonia, unspecified aspiration pneumonia type, unspecified laterality, unspecified part of lung ([facility] [address] [J69.0] and seen for anemia with rectal bleeding and coffee ground emesis. The pt was seen and examined. Hgb 7 the pt is having hematochezia. He has been extubated, no emesis this am. He denies abdominal pain. His peg is clamped.  .        OBJECTIVE  Scheduled Meds:   insulin glargine  10 Units SubCUTAneous BID    insulin lispro  0-4 Units SubCUTAneous Nightly    insulin lispro  0-16 Units SubCUTAneous Q4H    sodium chloride flush  5-40 mL IntraVENous 2 times per day    cefepime  1,000 mg IntraVENous Q12H    [Held by provider] bumetanide  1 mg Per G Tube Daily    [Held by provider] carvedilol  6.25 mg Per G Tube BID    [Held by provider] furosemide  40 mg Per G Tube Daily    [Held by provider] gabapentin  200 mg Per G Tube Nightly    [Held by provider] sacubitril-valsartan  1 tablet Per G Tube BID    [Held by provider] spironolactone  25 mg Per G Tube Daily    sertraline  50 mg Per G Tube Daily    albumin human  25 g IntraVENous Q8H    clindamycin (CLEOCIN) IV  600 mg IntraVENous Q8H    heparin (porcine)  4,000 Units IntraVENous Once    pantoprazole (PROTONIX) 40 mg injection  40 mg IntraVENous Q12H       Vital Signs:  /65   Pulse 94   Temp 100.4 °F (38 °C) (Axillary)   Resp 30   Ht 6' 3\" (1.905 m)   Wt 151 lb 10.8 oz (68.8 kg)   SpO2 98%   BMI 18.96 kg/m²    Review of Systems - History obtained from chart review and the patient  General ROS: positive for  - fatigue  Respiratory ROS: no cough, shortness of breath, or wheezing  Cardiovascular ROS: no chest pain or dyspnea on exertion  Gastrointestinal ROS: positive for - blood in stools  negative for - abdominal pain, hematemesis, melena, or nausea/vomiting   Physical Exam:     General Appearance: ill appearing alert and oriented to person, place and time, well-developed and well-nourished, in no acute distress  Skin: warm and dry, no rash or erythema  Head: normocephalic and atraumatic  Eyes: pupils equal, round, and reactive to light, extraocular eye movements intact, conjunctivae normal  ENT: hearing grossly normal bilaterally  Neck: neck supple and non tender without mass, no thyromegaly or thyroid nodules, no cervical lymphadenopathy   Pulmonary/Chest: clear to auscultation bilaterally- no wheezes, rales or rhonchi, normal air movement, no respiratory distress  Cardiovascular: normal rate, regular rhythm, normal S1 and S2, no murmurs, rubs, clicks or gallops, distal pulses intact, no carotid bruits  Abdomen: soft, non-tender, non-distended, normal bowel sounds, no masses or organomegaly peg is clamped no bleeding or sign of infection at the site  Extremities: no cyanosis, clubbing or edema bilateral BKA  Musculoskeletal: normal range of motion, no joint swelling, deformity or tenderness  Neurologic: no cranial nerve deficit and muscle strength normal    Lab and Imaging Review     CBC  Recent Labs     11/19/22  0557 11/19/22  2104 11/20/22  0547 11/20/22  1201 11/20/22  1810 11/21/22  0301 11/21/22  0423   WBC 17.9*  --  13.7*  --   --   --  8.1   HGB 8.4*   < > 7.6*   < > 7.5* 7.2* 7.0*   HCT 25.6*   < > 23.5*   < > 23.4* 23.2* 22.2*   MCV 93.2  --  94.3  --   --   --  96.5     --  147*  --   --   --  113*    < > = values in this interval not displayed.        BMP  Recent Labs     11/19/22  0557 11/20/22  0547 11/21/22  0423   * 141 145*   K 4.8 3.8 3.7   CL 90* 98 105   CO2 32* 30 23   * 116* 91* CREATININE 2.48* 2.05* 1.84*   GLUCOSE 309* 241* 192*   CALCIUM 8.8 8.7 8.9       LFTS  Recent Labs     11/18/22  1140 11/18/22  1225   ALKPHOS PLEASE DISREGARD RESULTS. SPECIMEN CONTAMINATED. 132*   ALT PLEASE DISREGARD RESULTS. SPECIMEN CONTAMINATED. 19   AST PLEASE DISREGARD RESULTS. SPECIMEN CONTAMINATED. 18   PROT PLEASE DISREGARD RESULTS. SPECIMEN CONTAMINATED. 7.6   BILITOT PLEASE DISREGARD RESULTS. SPECIMEN CONTAMINATED. 0.3   LABALBU PLEASE DISREGARD RESULTS. SPECIMEN CONTAMINATED. 3.6       AMYLASE/LIPASE/AMMONIA  Recent Labs     11/18/22  1140 11/18/22  1225   LIPASE PLEASE DISREGARD RESULTS. SPECIMEN CONTAMINATED. 51       PT/INR  Recent Labs     11/18/22  1145 11/18/22  1945   PROTIME 16.3* 18.2*   INR 1.3 1.5         ASSESSMENT/plan  Anemia with hematochezia and coffee ground material in the OG tube  -hgb 7 today, to get transfused per rn  Trend hh  ppi  Would need pulmonary clearance for endoscopy  OG tube has been removed no emesis overnight or this am, still having hematochezia    2 respiratory failure improved extubated yesterday    3. EDELMIRA    Time spent reviewing chart assessing pt and d/w md around 30 minutes    This plan was formulated in collaboration with Dr. Sekou Sequeira  . Electronically signed by: BARBARA Lerner CNP on 11/21/2022 at 7:21 AM     Attending Physician Statement          I have discussed the care of Dimas Al and   I have examined the patient myselft independently, and taken ros and hpi , including pertinent history and exam findings,  with the author of this note . I have reviewed the key elements of all parts of the encounter with the nurse practitioner/resident. I agree with the assessment, plan and orders as documented by the above health care provider     More than 50% of the time on this visit was spent by me   hysical Exam  Blood pressure 134/74, pulse 95, temperature 98 °F (36.7 °C), temperature source Axillary, resp.  rate 17, height 6' 3\" (1.905 m), weight 151 lb 10.8 oz (68.8 kg), SpO2 92 %. General Appearance: alert and oriented to person, place and time, well-developed and well-nourished, in no acute distress  Skin: warm and dry, no rash or erythema  Head: normocephalic and atraumatic  Eyes: pupils equal, round, and reactive to light, extraocular eye movements intact, conjunctivae normal  ENT: hearing grossly normal bilaterally  Neck: neck supple and non tender without mass, no thyromegaly or thyroid nodules, no cervical lymphadenopathy   Pulmonary/Chest: clear to auscultation bilaterally- no wheezes, rales or rhonchi, normal air movement, no respiratory distress  Cardiovascular: normal rate, regular rhythm, normal S1 and S2, no murmurs, rubs, clicks or gallops, distal pulses intact, no carotid bruits  Abdomen: soft, non-tender, non-distended, normal bowel sounds, no masses or organomegaly  Extremities: no cyanosis, clubbing or edema  Musculoskeletal: normal range of motion, no joint swelling, deformity or tenderness  Neurologic: no cranial nerve deficit and muscle strength normal    Data Review:    Recent Labs     11/19/22  0557 11/19/22  2104 11/20/22  0547 11/20/22  1201 11/21/22  0423 11/21/22  1222 11/21/22  1401   WBC 17.9*  --  13.7*  --  8.1  --   --    HGB 8.4*   < > 7.6*   < > 7.0* 8.4* 8.9*   HCT 25.6*   < > 23.5*   < > 22.2* 26.5* 29.3*   MCV 93.2  --  94.3  --  96.5  --   --      --  147*  --  113*  --   --     < > = values in this interval not displayed. Recent Labs     11/19/22  0557 11/20/22  0547 11/21/22  0423   * 141 145*   K 4.8 3.8 3.7   CL 90* 98 105   CO2 32* 30 23   * 116* 91*   CREATININE 2.48* 2.05* 1.84*     No results for input(s): AST, ALT, ALB, BILIDIR, BILITOT, ALKPHOS in the last 72 hours. No results for input(s): LIPASE, AMYLASE in the last 72 hours. Recent Labs     11/18/22 1945   PROTIME 18.2*   INR 1.5     No results for input(s): PTT in the last 72 hours.   No results for input(s): OCCULTBLD in the last 72 hours.   CEA:  No results found for: CEA  Ca 125:  No results found for:   Ca 19-9:  No results found for:   Ca 15-3:  No results found for:   AFP:  No components found for: AFAFP  Beta HCG:  No components found for: BHCG  Neuron Specific Enolase:  No results found for: NSE      Additional :    Flushing the PEG tube showed no blood  This is most likely lower GI bleed  We will continue to watch carefully and see  Transfuse as needed        Electronically signed by Farn Blanc MD

## 2022-11-22 ENCOUNTER — APPOINTMENT (OUTPATIENT)
Dept: CT IMAGING | Age: 67
DRG: 871 | End: 2022-11-22
Payer: MEDICARE

## 2022-11-22 LAB
ABSOLUTE EOS #: 0 K/UL (ref 0–0.4)
ABSOLUTE LYMPH #: 0.57 K/UL (ref 1–4.8)
ABSOLUTE MONO #: 1.25 K/UL (ref 0.1–1.3)
ANION GAP SERPL CALCULATED.3IONS-SCNC: 20 MMOL/L (ref 9–17)
BASOPHILS # BLD: 0 % (ref 0–2)
BASOPHILS ABSOLUTE: 0 K/UL (ref 0–0.2)
BUN BLDV-MCNC: 99 MG/DL (ref 8–23)
CALCIUM SERPL-MCNC: 9.1 MG/DL (ref 8.6–10.4)
CHLORIDE BLD-SCNC: 107 MMOL/L (ref 98–107)
CO2: 22 MMOL/L (ref 20–31)
CREAT SERPL-MCNC: 2 MG/DL (ref 0.7–1.2)
EOSINOPHILS RELATIVE PERCENT: 0 % (ref 0–4)
GFR SERPL CREATININE-BSD FRML MDRD: 36 ML/MIN/1.73M2
GLUCOSE BLD-MCNC: 122 MG/DL (ref 75–110)
GLUCOSE BLD-MCNC: 145 MG/DL (ref 70–99)
GLUCOSE BLD-MCNC: 150 MG/DL (ref 75–110)
HCT VFR BLD CALC: 28.1 % (ref 41–53)
HCT VFR BLD CALC: 30 % (ref 41–53)
HEMOGLOBIN: 8.8 G/DL (ref 13.5–17.5)
HEMOGLOBIN: 9.1 G/DL (ref 13.5–17.5)
LACTIC ACID: 1.1 MMOL/L (ref 0.5–2.2)
LYMPHOCYTES # BLD: 5 % (ref 24–44)
MCH RBC QN AUTO: 29.7 PG (ref 26–34)
MCHC RBC AUTO-ENTMCNC: 31.5 G/DL (ref 31–37)
MCV RBC AUTO: 94.2 FL (ref 80–100)
MONOCYTES # BLD: 11 % (ref 1–7)
MORPHOLOGY: ABNORMAL
PDW BLD-RTO: 18.6 % (ref 11.5–14.9)
PLATELET # BLD: 149 K/UL (ref 150–450)
PMV BLD AUTO: 10.1 FL (ref 6–12)
POTASSIUM SERPL-SCNC: 3.9 MMOL/L (ref 3.7–5.3)
RBC # BLD: 2.98 M/UL (ref 4.5–5.9)
SEG NEUTROPHILS: 84 % (ref 36–66)
SEGMENTED NEUTROPHILS ABSOLUTE COUNT: 9.58 K/UL (ref 1.3–9.1)
SODIUM BLD-SCNC: 149 MMOL/L (ref 135–144)
WBC # BLD: 11.4 K/UL (ref 3.5–11)

## 2022-11-22 PROCEDURE — 2060000000 HC ICU INTERMEDIATE R&B

## 2022-11-22 PROCEDURE — 71250 CT THORAX DX C-: CPT

## 2022-11-22 PROCEDURE — C9113 INJ PANTOPRAZOLE SODIUM, VIA: HCPCS | Performed by: INTERNAL MEDICINE

## 2022-11-22 PROCEDURE — 99291 CRITICAL CARE FIRST HOUR: CPT | Performed by: INTERNAL MEDICINE

## 2022-11-22 PROCEDURE — 99232 SBSQ HOSP IP/OBS MODERATE 35: CPT | Performed by: INTERNAL MEDICINE

## 2022-11-22 PROCEDURE — 2580000003 HC RX 258: Performed by: INTERNAL MEDICINE

## 2022-11-22 PROCEDURE — 2500000003 HC RX 250 WO HCPCS: Performed by: INTERNAL MEDICINE

## 2022-11-22 PROCEDURE — 85014 HEMATOCRIT: CPT

## 2022-11-22 PROCEDURE — A4216 STERILE WATER/SALINE, 10 ML: HCPCS | Performed by: INTERNAL MEDICINE

## 2022-11-22 PROCEDURE — 80048 BASIC METABOLIC PNL TOTAL CA: CPT

## 2022-11-22 PROCEDURE — 6360000002 HC RX W HCPCS: Performed by: INTERNAL MEDICINE

## 2022-11-22 PROCEDURE — 85025 COMPLETE CBC W/AUTO DIFF WBC: CPT

## 2022-11-22 PROCEDURE — 6370000000 HC RX 637 (ALT 250 FOR IP)

## 2022-11-22 PROCEDURE — 2580000003 HC RX 258

## 2022-11-22 PROCEDURE — 6370000000 HC RX 637 (ALT 250 FOR IP): Performed by: INTERNAL MEDICINE

## 2022-11-22 PROCEDURE — 83605 ASSAY OF LACTIC ACID: CPT

## 2022-11-22 PROCEDURE — 85018 HEMOGLOBIN: CPT

## 2022-11-22 PROCEDURE — 36415 COLL VENOUS BLD VENIPUNCTURE: CPT

## 2022-11-22 PROCEDURE — 82947 ASSAY GLUCOSE BLOOD QUANT: CPT

## 2022-11-22 PROCEDURE — APPSS30 APP SPLIT SHARED TIME 16-30 MINUTES: Performed by: NURSE PRACTITIONER

## 2022-11-22 PROCEDURE — 6360000002 HC RX W HCPCS

## 2022-11-22 RX ORDER — INSULIN LISPRO 100 [IU]/ML
0-4 INJECTION, SOLUTION INTRAVENOUS; SUBCUTANEOUS EVERY 6 HOURS
Status: DISCONTINUED | OUTPATIENT
Start: 2022-11-22 | End: 2022-11-24

## 2022-11-22 RX ORDER — QUETIAPINE FUMARATE 50 MG/1
50 TABLET, FILM COATED ORAL NIGHTLY
Status: DISCONTINUED | OUTPATIENT
Start: 2022-11-22 | End: 2022-11-25 | Stop reason: HOSPADM

## 2022-11-22 RX ADMIN — INSULIN GLARGINE 10 UNITS: 100 INJECTION, SOLUTION SUBCUTANEOUS at 11:08

## 2022-11-22 RX ADMIN — INSULIN GLARGINE 10 UNITS: 100 INJECTION, SOLUTION SUBCUTANEOUS at 23:04

## 2022-11-22 RX ADMIN — CEFEPIME 1000 MG: 1 INJECTION, POWDER, FOR SOLUTION INTRAMUSCULAR; INTRAVENOUS at 11:10

## 2022-11-22 RX ADMIN — CLINDAMYCIN IN 5 PERCENT DEXTROSE 600 MG: 12 INJECTION, SOLUTION INTRAVENOUS at 13:34

## 2022-11-22 RX ADMIN — ONDANSETRON 4 MG: 2 INJECTION INTRAMUSCULAR; INTRAVENOUS at 01:30

## 2022-11-22 RX ADMIN — SODIUM CHLORIDE, PRESERVATIVE FREE 10 ML: 5 INJECTION INTRAVENOUS at 23:06

## 2022-11-22 RX ADMIN — CLINDAMYCIN IN 5 PERCENT DEXTROSE 600 MG: 12 INJECTION, SOLUTION INTRAVENOUS at 22:53

## 2022-11-22 RX ADMIN — SERTRALINE HYDROCHLORIDE 50 MG: 50 TABLET ORAL at 11:08

## 2022-11-22 RX ADMIN — SODIUM CHLORIDE, PRESERVATIVE FREE 40 MG: 5 INJECTION INTRAVENOUS at 22:58

## 2022-11-22 RX ADMIN — HYDROCODONE BITARTRATE AND ACETAMINOPHEN 1 TABLET: 5; 325 TABLET ORAL at 19:42

## 2022-11-22 RX ADMIN — SODIUM CHLORIDE, PRESERVATIVE FREE 40 MG: 5 INJECTION INTRAVENOUS at 08:35

## 2022-11-22 RX ADMIN — CLINDAMYCIN IN 5 PERCENT DEXTROSE 600 MG: 12 INJECTION, SOLUTION INTRAVENOUS at 04:35

## 2022-11-22 RX ADMIN — HYDROCODONE BITARTRATE AND ACETAMINOPHEN 1 TABLET: 5; 325 TABLET ORAL at 01:13

## 2022-11-22 RX ADMIN — QUETIAPINE FUMARATE 50 MG: 50 TABLET ORAL at 20:51

## 2022-11-22 ASSESSMENT — ENCOUNTER SYMPTOMS
COUGH: 0
NAUSEA: 0
ABDOMINAL DISTENTION: 0
SORE THROAT: 0
DIARRHEA: 0
COLOR CHANGE: 0
ABDOMINAL PAIN: 0
SHORTNESS OF BREATH: 0
CHEST TIGHTNESS: 0
BACK PAIN: 0

## 2022-11-22 ASSESSMENT — PAIN SCALES - WONG BAKER: WONGBAKER_NUMERICALRESPONSE: 0

## 2022-11-22 NOTE — PROGRESS NOTES
ICU Progress Note (Non-Vent)  [facility]    Patient - Tammy Chavez,  Age - 79 y.o.    - 1955      Room Number -    N -  [id number]   [facility]  Date of Admission -  2022 11:46 AM    Events of Past 24 Hours   Apparently was moaning and agitated overnight according to nursing staff did not sleep    Vitals    height is 6' 3\" (1.905 m) and weight is 151 lb 10.8 oz (68.8 kg). His axillary temperature is 97.8 °F (36.6 °C). His blood pressure is 128/99 (abnormal) and his pulse is 93. His respiration is 23 and oxygen saturation is 97%.        Temperature Range: Temp: 97.8 °F (36.6 °C) Temp  Av.9 °F (36.6 °C)  Min: 97.8 °F (36.6 °C)  Max: 98.3 °F (36.8 °C)  BP Range:  Systolic (14BZL), WWE:419 , Min:108 , LUIS DANIEL:845     Diastolic (59VYW), YVT:06, Min:74, Max:104    Pulse Range: Pulse  Av.4  Min: 91  Max: 101  Respiration Range: Resp  Av.4  Min: 14  Max: 35  Current Pulse Ox[de-identified]  SpO2: 97 %  24HR Pulse Ox Range:  SpO2  Av.8 %  Min: 87 %  Max: 97 %  Oxygen Amount and Delivery: O2 Flow Rate (L/min): 2 L/min    Wt Readings from Last 3 Encounters:   22 151 lb 10.8 oz (68.8 kg)   22 156 lb 8.4 oz (71 kg)   22 150 lb (68 kg)     I/O     Intake/Output Summary (Last 24 hours) at 2022 0831  Last data filed at 2022 0603  Gross per 24 hour   Intake 1726.41 ml   Output 915 ml   Net 811.41 ml     DRAIN/TUBE OUTPUT       Invasive Lines   ICP PRESSURE RANGE  No data recorded  CVP PRESSURE RANGE  No data recorded      Medications      insulin glargine  10 Units SubCUTAneous BID    insulin lispro  0-4 Units SubCUTAneous Nightly    insulin lispro  0-16 Units SubCUTAneous Q4H    sodium chloride flush  5-40 mL IntraVENous 2 times per day    cefepime  1,000 mg IntraVENous Q12H    [Held by provider] bumetanide  1 mg Per G Tube Daily    [Held by provider] carvedilol  6.25 mg Per G Tube BID    [Held by provider] furosemide  40 mg Per G Tube Daily    [Held by provider] gabapentin  200 mg Per G Tube Nightly    [Held by provider] sacubitril-valsartan  1 tablet Per G Tube BID    [Held by provider] spironolactone  25 mg Per G Tube Daily    sertraline  50 mg Per G Tube Daily    clindamycin (CLEOCIN) IV  600 mg IntraVENous Q8H    heparin (porcine)  4,000 Units IntraVENous Once    pantoprazole (PROTONIX) 40 mg injection  40 mg IntraVENous Q12H     sodium chloride flush, ondansetron **OR** ondansetron, polyethylene glycol, acetaminophen **OR** acetaminophen, potassium chloride, glucose, dextrose bolus **OR** dextrose bolus, glucagon (rDNA), dextrose, HYDROcodone-acetaminophen, [Held by provider] heparin (porcine), [Held by provider] heparin (porcine)  IV Drips/Infusions   sodium chloride 50 mL/hr at 11/22/22 0603    dextrose      vasopressin (Septic Shock) infusion Stopped (11/19/22 1148)    norepinephrine (LEVOPHED) infusion Stopped (11/20/22 1009)    [Held by provider] heparin (PORCINE) Infusion Stopped (11/20/22 0604)       Diet/Nutrition   Diet NPO  ADULT TUBE FEEDING; PEG; Renal Formula; Continuous; 15; Yes; 15; Q 4 hours; 45; 100; Q 6 hours    Exam      Constitutional - Alert, arousable  General Appearance [place] ill-appearing  HEENT -normocephalic, atraumatic. PERRLA  Lungs - Chest expands equally, no wheezes, rales or rhonchi. Cardiovascular - Heart sounds are normal.  normal rate and rhythm regular, no murmur, gallop or rub. Abdomen - soft, nontender, nondistended, no masses or organomegaly  Neurologic - CN II-XII are grossly intact.  There are no focal motor deficits  Skin - no bruising or bleeding  Extremities - no cyanosis, clubbing or edema; bilateral BKA    Lab Results   CBC     Lab Results   Component Value Date/Time    WBC 11.4 11/22/2022 05:07 AM    RBC 2.98 11/22/2022 05:07 AM    HGB 8.8 11/22/2022 05:07 AM    HCT 28.1 11/22/2022 05:07 AM     11/22/2022 05:07 AM    MCV 94.2 11/22/2022 05:07 AM    MCH 29.7 11/22/2022 05:07 AM    MCHC 31.5 11/22/2022 05:07 AM    RDW 18.6 11/22/2022 05:07 AM    LYMPHOPCT 5 11/22/2022 05:07 AM    MONOPCT 11 11/22/2022 05:07 AM    BASOPCT 0 11/22/2022 05:07 AM    MONOSABS 1.25 11/22/2022 05:07 AM    LYMPHSABS 0.57 11/22/2022 05:07 AM    EOSABS 0.00 11/22/2022 05:07 AM    BASOSABS 0.00 11/22/2022 05:07 AM       BMP   Lab Results   Component Value Date/Time     11/22/2022 05:07 AM    K 3.9 11/22/2022 05:07 AM     11/22/2022 05:07 AM    CO2 22 11/22/2022 05:07 AM    BUN 99 11/22/2022 05:07 AM    CREATININE 2.00 11/22/2022 05:07 AM    GLUCOSE 145 11/22/2022 05:07 AM       LFTS  Lab Results   Component Value Date/Time    ALKPHOS 132 11/18/2022 12:25 PM    ALT 19 11/18/2022 12:25 PM    AST 18 11/18/2022 12:25 PM    PROT 7.6 11/18/2022 12:25 PM    BILITOT 0.3 11/18/2022 12:25 PM    LABALBU 3.6 11/18/2022 12:25 PM       ABG ABGs:   Lab Results   Component Value Date/Time    PHART 7.412 11/20/2022 04:22 AM    PO2ART 110.0 11/20/2022 04:22 AM    OHT6WFH 49.7 11/20/2022 04:22 AM       Lab Results   Component Value Date/Time    MODE PRVC 11/20/2022 04:22 AM         INR  No results for input(s): PROTIME, INR in the last 72 hours. APTT  No results for input(s): APTT in the last 72 hours. Lactic Acid  Lab Results   Component Value Date/Time    LACTA 0.6 11/18/2022 12:51 PM        BNP   No results for input(s): BNP in the last 72 hours.      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  Cardiomyopathy EF 10 to 15%  Type 2 diabetes  History of embolic stroke  Aphasia  Acute on chronic kidney disease, did have dialysis while at [facility]    Hemodynamically stable  Moderate risk for EGD watch for oversedation  Hemoglobin and hematocrit stable after receiving 1 unit packed red blood cell  Check CT of chest, patient had cavitary lesions seen on [facility] CAT scan sometime in September  Add Seroquel at night to help him sleep  Remove central line now that peripheral IV access has been established  Okay to transfer out of ICU  Patient is a full code but in discussing with his brother Breezy Jung, does not want to be overly aggressive.     Critical Care Time   0 min    Electronically signed by Kathye Dance, MD on 11/22/2022 at 8:31 AM

## 2022-11-22 NOTE — PROGRESS NOTES
NEPHROLOGY PROGRESS NOTE    Patient :  Elise Laird; 79 y.o. MRN# [id number]  Location:  2009/2009-01  Attending:  Sneha Grace MD  Admit Date:  11/18/2022   Hospital Day: 4    Reason for consultation: Management of acute kidney injury. Consulting physician: Sneha Grace MD.    Interval history: Patient was seen and examined today in the intensive care unit. He was extubated 11/2022. He is awake but aphasic. He does not appear to be in acute respiratory distress. He has been started on clear liquids -pt  is hypernatremic sodium 149 mmol/l. Laboratory study results were reviewed-BUN/creatinine 99 and 2.0 mg/dL. He is nonoliguric. History of Present Illness: This is a 79 y.o. 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 Patient had an episode of vomiting and developed shortness of breath and therefore aspiration pneumonia suspected patient was noted to be hypoxic with pulse ox in 70s on room air.   Patient initially was placed on nonrebreather and then patient was intubated last night currently on ventilator  Patient also noted to be hypotensive, on Levophed  Labs reviewed, serum creatinine on admission BUN was noted to be 139 and creatinine 2.5 mg/dL, patient's last serum creatinine on November 11, 2022 was 1.2 mg/dL  Patient is nonoliguric urine output 650 mL overnight    Current Medications:    QUEtiapine (SEROQUEL) tablet 50 mg, Nightly  0.45 % sodium chloride infusion, Continuous  insulin glargine (LANTUS) injection vial 10 Units, BID  insulin lispro (HUMALOG) injection vial 0-4 Units, Nightly  insulin lispro (HUMALOG) injection vial 0-16 Units, Q4H  sodium chloride flush 0.9 % injection 5-40 mL, 2 times per day  sodium chloride flush 0.9 % injection 5-40 mL, PRN  ondansetron (ZOFRAN-ODT) disintegrating tablet 4 mg, Q8H PRN   Or  ondansetron (ZOFRAN) injection 4 mg, Q6H PRN  polyethylene glycol (GLYCOLAX) packet 17 g, Daily PRN  acetaminophen (TYLENOL) tablet 650 mg, Q6H PRN   Or  acetaminophen (TYLENOL) suppository 650 mg, Q6H PRN  potassium chloride 10 mEq/100 mL IVPB (Peripheral Line), PRN  cefepime (MAXIPIME) 1,000 mg in sodium chloride 0.9 % 50 mL IVPB (mini-bag), Q12H  glucose chewable tablet 16 g, PRN  dextrose bolus 10% 125 mL, PRN   Or  dextrose bolus 10% 250 mL, PRN  glucagon (rDNA) injection 1 mg, PRN  dextrose 10 % infusion, Continuous PRN  [Held by provider] bumetanide (BUMEX) tablet 1 mg, Daily  [Held by provider] carvedilol (COREG) tablet 6.25 mg, BID  [Held by provider] furosemide (LASIX) tablet 40 mg, Daily  [Held by provider] gabapentin (NEURONTIN) capsule 200 mg, Nightly  HYDROcodone-acetaminophen (NORCO) 5-325 MG per tablet 1 tablet, Q6H PRN  [Held by provider] sacubitril-valsartan (ENTRESTO) 24-26 MG per tablet 1 tablet, BID  [Held by provider] spironolactone (ALDACTONE) tablet 25 mg, Daily  sertraline (ZOLOFT) tablet 50 mg, Daily  clindamycin (CLEOCIN) 600 mg in dextrose 5 % 50 mL IVPB, Q8H  heparin (porcine) injection 4,000 Units, Once  [Held by provider] heparin (porcine) injection 4,000 Units, PRN  [Held by provider] heparin (porcine) injection 2,000 Units, PRN  [Held by provider] heparin 25,000 units in dextrose 5% 250 mL (premix) infusion, Continuous  pantoprazole (PROTONIX) 40 mg in sodium chloride (PF) 0.9 % 10 mL injection, Q12H    Objective:  CURRENT TEMPERATURE:  Temp: 97.8 °F (36.6 °C)  MAXIMUM TEMPERATURE OVER 24HRS:  Temp (24hrs), Av °F (36.7 °C), Min:97.8 °F (36.6 °C), Max:98.3 °F (36.8 °C)    CURRENT RESPIRATORY RATE:  Resp: 23  CURRENT PULSE:  Heart Rate: 95  CURRENT BLOOD PRESSURE:  BP: (!) 126/106  24HR BLOOD PRESSURE RANGE:  Systolic (37REE), ZEK:064 , Min:108 , RUO:314   ; Diastolic (60BGH), BGO:61, Min:74, Max:106    24HR INTAKE/OUTPUT:    Intake/Output Summary (Last 24 hours) at 2022 1108  Last data filed at 2022 1058  Gross per 24 hour   Intake 2058. 8 ml   Output 1075 ml   Net 983.8 ml       Patient Vitals for the past 96 hrs (Last 3 readings):   Weight   22 0545 151 lb 10.8 oz (68.8 kg)   22 1515 150 lb 5.7 oz (68.2 kg)   22 1202 155 lb (70.3 kg)       Physical Exam:  GENERAL APPEARANCE: Awake and alert but with expressive aphasia. HEAD: Normocephalic and atraumatic  EYES:  EOMI. Not pale, anicteric   CARDIAC: Normal S1 and S2. No S3, S4 or murmurs. Rhythm is regular. LUNGS:diminished breath sounds. Coarse breath sounds no accessory muscle use  ABDOMEN: With PEG tube in situ. Normal bowel sounds. EXTREMITIES: No edema. Bilateral BKA  NEURO: Nonfocal    Labs:   CBC:  Recent Labs     22  0547 22  1201 22  0423 22  1222 22  1401 22  2013 22  0507   WBC 13.7*  --  8.1  --   --   --  11.4*   RBC 2.49*  --  2.30*  --   --   --  2.98*   HGB 7.6*   < > 7.0*   < > 8.9* 8.7* 8.8*   HCT 23.5*   < > 22.2*   < > 29.3* 27.1* 28.1*   MCV 94.3  --  96.5  --   --   --  94.2   MCH 30.5  --  30.3  --   --   --  29.7   MCHC 32.3  --  31.4  --   --   --  31.5   RDW 16.5*  --  16.6*  --   --   --  18.6*   *  --  113*  --   --   --  149*   MPV 9.9  --  9.8  --   --   --  10.1    < > = values in this interval not displayed.         BMP:   Recent Labs     22  0547 22  0423 22  0507    145* 149*   K 3.8 3.7 3.9   CL 98 105 107   CO2 30 23 22   * 91* 99*   CREATININE 2.05* 1.84* 2.00*   GLUCOSE 241* 192* 145*   CALCIUM 8.7 8.9 9.1        Phosphorus:  No results for input(s): PHOS in the last 72 hours. Magnesium:   No results for input(s): MG in the last 72 hours. Albumin:   No results for input(s): LABALBU in the last 72 hours. \  No results for input(s): PROT, ALBCAL, ALBCAL, ALBPCT, LABALPH, A1PCT, LABALPH, A2PCT, LABBETA, BETAPCT, GAMGLOB, GGPCT, PATH in the last 72 hours. No results for input(s): VÍCTOR in the last 72 hours. Urine Creatinine:    No results for input(s): LABCREA in the last 72 hours. Urinalysis:    No results for input(s): Gareld Schuyler, PHUR, LABCAST, WBCUA, RBCUA, MUCUS, TRICHOMONAS, YEAST, BACTERIA, CLARITYU, SPECGRAV, LEUKOCYTESUR, UROBILINOGEN, BILIRUBINUR, BLOODU, GLUCOSEU, KETUA, AMORPHOUS in the last 72 hours. Assessment/plan:    1. Acute kidney injury on chronic kidney disease stage 3-acute kidney injury in this patient is most consistent with ischemic acute tubular necrosis secondary to hypotension, prerenal azotemia from diuretics, Entresto and Aldactone. He is nonoliguric [Urine output of 915 cc last 24 hours]. Plan: There are no indications for acute hemodialysis at this time. Continue IV fluid 0.45  NS at 50 mls per hour. Monitor urine output closely. Continue to hold diuretics, Entresto and spironolactone. Basic metabolic profile daily. 2.  Hypernatremia consistent  with estimated free water deficit approximately 1.7 L.-Continue free water flushes 200 cc 6 hourly. 0.45 saline at 50 cc an hour and reassess tomorrow for discontinuation of IV fluids    3. Heart failure with reduced ejection fraction [HFrEF with LVEF 15 to 20%] - patient will  need diuretics to maintain euvolemia    Prognosis is guarded.     Electronically signed by Rose Zamudio MD on 11/22/2022 at 11:08 AM

## 2022-11-22 NOTE — PROGRESS NOTES
Pt appears to be in pain. Nods head \"yes\" when asked if in pain. Restless and yelling out. Norco given as ordered. Pt repositioned. Will cont to monitor.

## 2022-11-22 NOTE — PROGRESS NOTES
[address] [employer]    PROGRESS NOTE             11/22/2022    8:21 AM    Name:   Sergio Rodriguez  MRN:     [id number]     Acct:      [de-identified]   Room:   2009/2009-01  IP Day:  4  Admit Date:  11/18/2022 11:46 AM    PCP:  Erika Robertson MD  Code Status:  Full Code    Subjective:     C/C:   Chief Complaint   Patient presents with    Shortness of Breath     Interval History Status: improved    Hemoglobin stable today  Transferred out of ICU  Clinically better  Heart rate borderline tachycardic at 94, good oxygen sats, blood pressure 133/92    Brief History:     The patient is a 79 y.o. 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 The nursing staff had concerns for aspiration. His oxygen saturation dropped to the 70s. The patient was transferred to [facility] for evaluation. In the ED, the patient was tachycardic (106), tachypneic (RR 25), and hypoxic (02 saturation in 70s). The patient was started on a 100% non re-breather. The patient was then weaned down to a 10 L salter (02 saturation 98%). The salter was then decreased to 8L. Chest x-ray showed bibasilar pulmonary opacities suggesting pneumonia. The patient was started on Cefepime and Clindamycin. EKG showed sinus tachycardia with 1st degree AV block, left anterior fascicular block, and anterolateral infarct (age undetermined). WBCS were elevated at 16.7. Hgb was 11.3. ABGs showed pH 7.405, pC02 61.4, p02 81.2, Hc03 38.4. Blood cultures were drawn and results are pending. The patient was given normal saline bolus.  The patient was admitted for acute hypoxic, hypercapnic respiratory failure secondary to suspected bilateral aspiration pneumonia    Review of Systems:     Review of Systems   Constitutional:  Negative for activity change, fatigue, fever and unexpected weight change. HENT:  Negative for congestion and sore throat. Respiratory:  Negative for cough, chest tightness and shortness of breath. Cardiovascular:  Negative for chest pain and leg swelling. Gastrointestinal:  Negative for abdominal distention, abdominal pain, diarrhea and nausea. Endocrine: Negative for polyuria. Genitourinary:  Negative for dysuria, frequency and urgency. Musculoskeletal:  Negative for arthralgias and back pain. Skin:  Negative for color change and pallor. Neurological:  Negative for dizziness, facial asymmetry, weakness, light-headedness, numbness and headaches. Psychiatric/Behavioral:  Negative for agitation and behavioral problems. Medications: Allergies:     Allergies   Allergen Reactions    Penicillins Rash       Current Meds:   Scheduled Meds:    insulin glargine  10 Units SubCUTAneous BID    insulin lispro  0-4 Units SubCUTAneous Nightly    insulin lispro  0-16 Units SubCUTAneous Q4H    sodium chloride flush  5-40 mL IntraVENous 2 times per day    cefepime  1,000 mg IntraVENous Q12H    [Held by provider] bumetanide  1 mg Per G Tube Daily    [Held by provider] carvedilol  6.25 mg Per G Tube BID    [Held by provider] furosemide  40 mg Per G Tube Daily    [Held by provider] gabapentin  200 mg Per G Tube Nightly    [Held by provider] sacubitril-valsartan  1 tablet Per G Tube BID    [Held by provider] spironolactone  25 mg Per G Tube Daily    sertraline  50 mg Per G Tube Daily    clindamycin (CLEOCIN) IV  600 mg IntraVENous Q8H    heparin (porcine)  4,000 Units IntraVENous Once    pantoprazole (PROTONIX) 40 mg injection  40 mg IntraVENous Q12H     Continuous Infusions:    sodium chloride 50 mL/hr at 11/22/22 0603    dextrose      vasopressin (Septic Shock) infusion Stopped (11/19/22 1148) norepinephrine (LEVOPHED) infusion Stopped (22 1009)    [Held by provider] heparin (PORCINE) Infusion Stopped (22 0604)     PRN Meds: sodium chloride flush, ondansetron **OR** ondansetron, polyethylene glycol, acetaminophen **OR** acetaminophen, potassium chloride, glucose, dextrose bolus **OR** dextrose bolus, glucagon (rDNA), dextrose, HYDROcodone-acetaminophen, [Held by provider] heparin (porcine), [Held by provider] heparin (porcine)    Data:     Past Medical History:   has a past medical history of 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 ([facility] [address] Acquired absence of right leg below knee ([facility] [address] Acute kidney failure ([facility] [address] Acute respiratory failure with hypoxia ([facility] [address] Cerebral infarction due to embolism ([facility] [address] Chronic kidney disease, unspecified, Chronic systolic (congestive) heart failure ([facility] [address] Depression, Heart failure ([facility] [address] Hypertension, Muscle weakness (generalized), Other symbolic dysfunctions, Peripheral vascular disease ([facility] [address] and Type 2 diabetes mellitus ([facility] [address] Social History:        Family History: History reviewed. No pertinent family history. Vitals:  BP (!) 128/99   Pulse 93   Temp 97.8 °F (36.6 °C) (Axillary)   Resp 23   Ht 6' 3\" (1.905 m)   Wt 151 lb 10.8 oz (68.8 kg)   SpO2 97%   BMI 18.96 kg/m²   Temp (24hrs), Av.9 °F (36.6 °C), Min:97.8 °F (36.6 °C), Max:98.3 °F (36.8 °C)    Recent Labs     22  1109 22  1600 22  0753   POCGLU 158* 151* 136* 122*       I/O(24Hr):     Intake/Output Summary (Last 24 hours) at 2022 2378  Last data filed at 2022 0603  Gross per 24 hour   Intake 1726.41 ml   Output 915 ml   Net 811.41 ml       Labs:  [unfilled]    Lab Results   Component Value Date/Time    SPECIAL RIGHT HAND 2022 11:56 AM     Lab Results   Component Value Date/Time    CULTURE NORMAL RESPIRATORY MOIRA MODERATE GROWTH 2022 06:23 PM       [unfilled]    Radiology:    US RENAL LIMITED    Result Date: 11/19/2022  EXAMINATION: ULTRASOUND OF THE KIDNEYS 11/19/2022 6:22 am COMPARISON: None. HISTORY: ORDERING SYSTEM PROVIDED HISTORY: EDELMIRA TECHNOLOGIST PROVIDED HISTORY: EDELMIRA FINDINGS: The right kidney measures 10.9 cm in length and the left kidney measures 10.9 cm in length. Bilateral renal cysts measuring measuring up to 2.5 cm on the right and 3.1 cm on the left. Kidneys otherwise demonstrate normal cortical echogenicity. No hydronephrosis or intrarenal stones. No focal lesions. Benign bilateral renal cysts     XR CHEST PORTABLE    Result Date: 11/20/2022  EXAMINATION: ONE XRAY VIEW OF THE CHEST 11/20/2022 2:53 am COMPARISON: 11/19/2022 HISTORY: ORDERING SYSTEM PROVIDED HISTORY: ETT placement TECHNOLOGIST PROVIDED HISTORY: ETT placement Reason for Exam: ETT placement Additional signs and symptoms: ETT placement Relevant Medical/Surgical History: ETT placement FINDINGS: There is an ET tube with the tip 5.3 cm above the amparo. There is an NG tube overlying the left upper quadrant, however the most distal portion of the tube is not visualized. Cardiac size is enlarged. Stable bibasal infiltrates with underlying left basilar atelectasis. .  The pulmonary vascularity is stable. No pneumothorax. No significant pleural effusions identified . Pink Piero Stable chest.     XR CHEST PORTABLE    Result Date: 11/19/2022  EXAMINATION: ONE XRAY VIEW OF THE CHEST 11/19/2022 5:49 am COMPARISON: 11/18/2022 HISTORY: ORDERING SYSTEM PROVIDED HISTORY: ETT placement TECHNOLOGIST PROVIDED HISTORY: ETT placement Reason for Exam: ETT placement Additional signs and symptoms: ETT placement Relevant Medical/Surgical History: ETT placement FINDINGS: Endotracheal tube approximately 6 cm above the amparo. Enteric catheter courses into the abdomen. Heart size stable. Bibasilar pulmonary opacities. No pneumothorax. No new airspace disease.      Persistent bibasilar pulmonary opacities could represent atelectasis or pneumonia     XR CHEST PORTABLE    Result Date: 11/18/2022  EXAMINATION: ONE XRAY VIEW OF THE CHEST 11/18/2022 6:00 pm COMPARISON: Radiograph performed earlier the same day HISTORY: ORDERING SYSTEM PROVIDED HISTORY: intubated TECHNOLOGIST PROVIDED HISTORY: intubated Reason for Exam: Intubation FINDINGS: Endotracheal tube terminates 7 cm above the amparo. Enteric tube is below the diaphragm. Cardiomediastinal silhouette is unchanged in size. Aortic atherosclerosis. No large pleural effusion or pneumothorax. There is patchy opacity in the right lung base. There is linear scarring/atelectasis in the left lung base. 1. Lines and tubes in expected position. 2. Bibasilar pulmonary opacities are again noted. XR CHEST PORTABLE    Result Date: 11/18/2022  EXAMINATION: ONE XRAY VIEW OF THE CHEST 11/18/2022 11:58 am COMPARISON: 09/27/2022 HISTORY: ORDERING SYSTEM PROVIDED HISTORY: sob TECHNOLOGIST PROVIDED HISTORY: sob Reason for Exam: sob FINDINGS: Stable cardiomegaly. Bibasilar pulmonary opacities noted. No pulmonary vascular congestion or edema. No pneumothorax. Bibasilar pulmonary opacities suggesting pneumonia         Physical Examination:        Physical Exam  Constitutional:       General: He is not in acute distress. Appearance: He is not ill-appearing. HENT:      Head: Atraumatic. Nose: No congestion. Mouth/Throat:      Mouth: Mucous membranes are moist.   Cardiovascular:      Rate and Rhythm: Normal rate and regular rhythm. Heart sounds: No murmur heard. Pulmonary:      Effort: Pulmonary effort is normal. No respiratory distress. Breath sounds: Rhonchi present. No wheezing. Chest:      Chest wall: No tenderness. Abdominal:      General: Abdomen is flat. Bowel sounds are normal. There is no distension. Palpations: Abdomen is soft. There is no mass. Tenderness: There is no abdominal tenderness. Hernia: No hernia is present.    Musculoskeletal:      Cervical back: No rigidity or tenderness. Comments: Bilateral below the knee amputations   Neurological:      General: No focal deficit present. Mental Status: He is alert and oriented to person, place, and time. Psychiatric:         Mood and Affect: Mood normal.         Assessment:        Primary Problem  Acute respiratory failure with hypoxia and hypercapnia ([facility] [address]    Active Hospital Problems    Diagnosis Date Noted    Coffee ground emesis [K92.0] 11/20/2022     Priority: Medium    Rectal bleeding [K62.5] 11/20/2022     Priority: Medium    Acute respiratory failure with hypoxia and hypercapnia (HCC) [J96.01, J96.02] 11/18/2022     Priority: Medium    Aspiration pneumonia, unspecified aspiration pneumonia type, unspecified laterality, unspecified part of lung ([facility] [address] [J69.0] 11/18/2022     Priority: Medium       Plan:        Patient with hypoxic hypercapnic story failure likely due to aspiration pneumonia recently extubated being treated with cefepime and clindamycin-negative blood cultures    Acute hypoxic hypercapnic respiratory failure 2/2 to bilateral aspiration pneumonia   -Patient was hypoxic on presentation (02 sat in 70s);  Extubated yesterday  -CXR 11/18: bibasilar pulmonary opacities suggesting pneumonia  -CXR 11/19: persistent bibasilar pulmonary opacities could represent atelectasis vs. Pneumonia  -CXR 11/20: stable bibasilar infiltrates with underlying left basilar atelectasis  --ABG on admission: pH 7.405, pC02 61.4, p02 81.2, HC03 38.4  -ABGs 11/21/2022: pH 7.412, pC02 49.7, p02 110., HC03 31.7  -CT scan of chest once medically stable  -Pulmonology saw the patient, 1 Saturations above 98%, off pressors now  -Continue cefepime and clindamycin  -Monitor hemoglobin, if Hb fall above standard transfuse  -White blood cells 11.4 today  -Blood cultures negative for the last 12 hours  --Pro-calcitonin 0.27  -Lactic acid reordered, last result was contaminated  -Patient has slight hypERnatremia at 149 today    Acute on Chronic kidney disease  -Creatinine on admission 2.54 (baseline 1.31); Cr today 2.0 today  -Urine Creatinine 40.6, random urine sodium 29  -Renal ultrasound: benign bilateral renal cysts.     -Nephrology consulted -no indications for dialysis at this time, continue IV fluids 0.9 NS, and monitor urine output, continue to hold diuretics, and Entresto, spironolactone      DM2  -POCT glucose checks  -High dose sliding scale; continue 10 U lantus  -initiate hypoglycemic protocol  -Continue Gabapentin 200 mg nightly      History of embolic stroke  -Hold Warfarin 10 mg po daily (unclear if Warfarin is for severe peripheral artery disease or for possible history of atrial fibrillation)  -Patient is off heparin drip due to clots found in stool and hgb drop to 7.0  -monitor H + H   -Telemetry continuous monitoring      Anemia  -Hgb on admission 11.3; today 8.8  -H and H q 8 hours  -Given unit of blood yesterday  -Hold heparin drip  -Continue Protonix 40 mg IV q 12   -GI consulted  (questionable stress ulcers, Continue IV PPI, transfuse for Hgb < 7, possible EGD and colonoscopy pending pulmonary clearance)     Elevated troponin   -Troponin on admission 160; 263 9/28  -EKG: sinus tachycardia with 1st degree AV block, anterior lateral infarct age undetermined   -Heparin on hold      CHF (EF (10-15%)  -EKG: sinus tachycardia with 1st degree AV block, left anterior fascicular block, and anterolateral infarct (age undetermined)  - Echo 9/28/22: EF < 20; RV function appears impaired; AV is sclerotic  -Hold Bumex  -Hold Coreg 6.25 mg BID  -Hold Lasix 40 mg daily  -Hold Entresto BID  -Hold Aldactone 25 mg daily       Depression/anxiety  -Continue Zoloft 50 mg daily     DVT prophylaxis: Hold home Warfarin 10 mg po daily; Hold Heparin due to hemoglobin levels following  Code: Full code  Diet: NPO  PT/OT/SW consulted    Ashly Oconnell MD  11/22/2022  8:21 AM       Attestation and add on       I have discussed the care of Elise Laird , including pertinent history and exam findings,      11/22/22    with the resident. I have seen and examined the patient and the key elements of all parts of the encounter have been performed by me . I agree with the assessment, plan and orders as documented by the resident. Hospital Problems             Last Modified POA    * (Principal) Acute respiratory failure with hypoxia and hypercapnia (HCC) 11/18/2022 Yes    Aspiration pneumonia, unspecified aspiration pneumonia type, unspecified laterality, unspecified part of lung ([address] 11/18/2022 Yes    Coffee ground emesis 11/20/2022 Yes    Rectal bleeding 11/20/2022 Yes           ---critical time 35 minutes- ;        Medications: Allergies:     Allergies   Allergen Reactions    Penicillins Rash       Current Meds:   Scheduled Meds:    [Held by provider] polyethylene glycol  4,000 mL Oral Once    QUEtiapine  50 mg Oral Nightly    insulin lispro  0-4 Units SubCUTAneous Q6H    insulin glargine  10 Units SubCUTAneous BID    sodium chloride flush  5-40 mL IntraVENous 2 times per day    cefepime  1,000 mg IntraVENous Q12H    [Held by provider] bumetanide  1 mg Per G Tube Daily    [Held by provider] carvedilol  6.25 mg Per G Tube BID    [Held by provider] furosemide  40 mg Per G Tube Daily    [Held by provider] gabapentin  200 mg Per G Tube Nightly    [Held by provider] sacubitril-valsartan  1 tablet Per G Tube BID    [Held by provider] spironolactone  25 mg Per G Tube Daily    sertraline  50 mg Per G Tube Daily    clindamycin (CLEOCIN) IV  600 mg IntraVENous Q8H    heparin (porcine)  4,000 Units IntraVENous Once    pantoprazole (PROTONIX) 40 mg injection  40 mg IntraVENous Q12H     Continuous Infusions:    sodium chloride 50 mL/hr at 11/23/22 0623    dextrose      [Held by provider] heparin (PORCINE) Infusion Stopped (11/20/22 0604)     PRN Meds: potassium chloride **OR** potassium alternative oral replacement **OR** potassium chloride, bisacodyl, sodium chloride flush, ondansetron **OR** ondansetron, polyethylene glycol, acetaminophen **OR** acetaminophen, potassium chloride, glucose, dextrose bolus **OR** dextrose bolus, glucagon (rDNA), dextrose, HYDROcodone-acetaminophen, [Held by provider] heparin (porcine), [Held by provider] heparin (porcine)      MD LAN [address], [address] [place] [address].    Phone [phone]   Fax: [phone]  Answering Service: [phone]

## 2022-11-22 NOTE — PROGRESS NOTES
[facility]    Gastroenterology Daily Progress Note      Patient:   Jazmyn Alejandro   :    1955   Facility:   [facility]  Date:     2022  Consultant:   BARBARA Wren CNP, CNP      SUBJECTIVE  79 y.o. male admitted 2022 with Acute respiratory failure with hypoxia and hypercapnia (HCC) [J96.01, J96.02]  Pneumonia of both lower lobes due to infectious organism [J18.9]  Aspiration pneumonia, unspecified aspiration pneumonia type, unspecified laterality, unspecified part of lung ([address] [J69.0] and seen for anemia with rectal bleeding, coffee ground emesis. The pt was seen and examined. Hgb 8.8, no  BM overnight, no emesis. He denies abdominal pain.  .        OBJECTIVE  Scheduled Meds:   insulin glargine  10 Units SubCUTAneous BID    insulin lispro  0-4 Units SubCUTAneous Nightly    insulin lispro  0-16 Units SubCUTAneous Q4H    sodium chloride flush  5-40 mL IntraVENous 2 times per day    cefepime  1,000 mg IntraVENous Q12H    [Held by provider] bumetanide  1 mg Per G Tube Daily    [Held by provider] carvedilol  6.25 mg Per G Tube BID    [Held by provider] furosemide  40 mg Per G Tube Daily    [Held by provider] gabapentin  200 mg Per G Tube Nightly    [Held by provider] sacubitril-valsartan  1 tablet Per G Tube BID    [Held by provider] spironolactone  25 mg Per G Tube Daily    sertraline  50 mg Per G Tube Daily    clindamycin (CLEOCIN) IV  600 mg IntraVENous Q8H    heparin (porcine)  4,000 Units IntraVENous Once    pantoprazole (PROTONIX) 40 mg injection  40 mg IntraVENous Q12H       Vital Signs:  BP (!) 128/99   Pulse 93   Temp 97.8 °F (36.6 °C) (Axillary)   Resp 23   Ht 6' 3\" (1.905 m)   Wt 151 lb 10.8 oz (68.8 kg)   SpO2 97%   BMI 18.96 kg/m²    Review of Systems - History obtained from chart review and the patient  General ROS: positive for  - fatigue  Respiratory ROS: no cough, shortness of breath, or wheezing  Cardiovascular ROS: no chest pain or dyspnea on exertion  Gastrointestinal ROS: positive for - blood in stools  negative for - abdominal pain, hematemesis, melena, or nausea/vomiting   Physical Exam:         General Appearance: alert and oriented to person, place and time, well-developed and well-nourished, in no acute distress  Skin: warm and dry, no rash or erythema  Head: normocephalic and atraumatic  Eyes: pupils equal, round, and reactive to light, extraocular eye movements intact, conjunctivae normal  ENT: hearing grossly normal bilaterally  Neck: neck supple and non tender without mass, no thyromegaly or thyroid nodules, no cervical lymphadenopathy   Pulmonary/Chest: clear to auscultation bilaterally- no wheezes, rales or rhonchi, normal air movement, no respiratory distress  Cardiovascular: normal rate, regular rhythm, normal S1 and S2, no murmurs, rubs, clicks or gallops, distal pulses intact, no carotid bruits  Abdomen: soft, non-tender, non-distended, normal bowel sounds, no masses or organomegaly peg tube clamped  Extremities: no cyanosis, clubbing or edema  Musculoskeletal: normal range of motion, no joint swelling, deformity or tenderness  Neurologic: no cranial nerve deficit and muscle strength normal    Lab and Imaging Review     CBC  Recent Labs     11/20/22  0547 11/20/22  1201 11/21/22  0423 11/21/22  1222 11/21/22  1401 11/21/22 2013 11/22/22  0507   WBC 13.7*  --  8.1  --   --   --  11.4*   HGB 7.6*   < > 7.0*   < > 8.9* 8.7* 8.8*   HCT 23.5*   < > 22.2*   < > 29.3* 27.1* 28.1*   MCV 94.3  --  96.5  --   --   --  94.2   *  --  113*  --   --   --  149*    < > = values in this interval not displayed.        BMP  Recent Labs     11/20/22  0547 11/21/22 0423 11/22/22  0507    145* 149*   K 3.8 3.7 3.9   CL 98 105 107   CO2 30 23 22   * 91* 99*   CREATININE 2.05* 1.84* 2.00*   GLUCOSE 241* 192* 145*   CALCIUM 8.7 8.9 9.1         ASSESSMENT/plan  Anemia with hematochezia and coffee ground material in the OG Moderate risk for endoscopy per pulmonary service, will d/w md  Ok for tube feeding today   Trend hh  ppi    2 respiratory failure improved     3. EDELMIRA    Time spent reviewing chart assessing pt and d/w md around 30 minutes       This plan was formulated in collaboration with Dr. Crystal Smith  . Electronically signed by: BARBARA Simms CNP on 11/22/2022 at 7:31 AM   Attending Physician Statement          I have discussed the care of Callelise Many and   I have examined the patient myselft independently, and taken ros and hpi , including pertinent history and exam findings,  with the author of this note . I have reviewed the key elements of all parts of the encounter with the nurse practitioner/resident. I agree with the assessment, plan and orders as documented by the above health care provider     More than 50% of the time on this visit was spent by me   hysical Exam  Blood pressure (!) 137/92, pulse 96, temperature 98.3 °F (36.8 °C), temperature source Axillary, resp. rate 20, height 6' 3\" (1.905 m), weight 151 lb 10.8 oz (68.8 kg), SpO2 95 %.          General Appearance: alert and oriented to person, place and time, well-developed and well-nourished, in no acute distress  Skin: warm and dry, no rash or erythema  Head: normocephalic and atraumatic  Eyes: pupils equal, round, and reactive to light, extraocular eye movements intact, conjunctivae normal  ENT: hearing grossly normal bilaterally  Neck: neck supple and non tender without mass, no thyromegaly or thyroid nodules, no cervical lymphadenopathy   Pulmonary/Chest: clear to auscultation bilaterally- no wheezes, rales or rhonchi, normal air movement, no respiratory distress  Cardiovascular: normal rate, regular rhythm, normal S1 and S2, no murmurs, rubs, clicks or gallops, distal pulses intact, no carotid bruits  Abdomen: soft, non-tender, non-distended, normal bowel sounds, no masses or organomegaly  Extremities: no cyanosis, clubbing or edema  Musculoskeletal: normal range of motion, no joint swelling, deformity or tenderness  Neurologic: no cranial nerve deficit and muscle strength normal    Data Review:    Recent Labs     11/20/22  0547 11/20/22  1201 11/21/22  0423 11/21/22  1222 11/21/22  2013 11/22/22  0507 11/22/22  1614   WBC 13.7*  --  8.1  --   --  11.4*  --    HGB 7.6*   < > 7.0*   < > 8.7* 8.8* 9.1*   HCT 23.5*   < > 22.2*   < > 27.1* 28.1* 30.0*   MCV 94.3  --  96.5  --   --  94.2  --    *  --  113*  --   --  149*  --     < > = values in this interval not displayed. Recent Labs     11/20/22  0547 11/21/22  0423 11/22/22  0507    145* 149*   K 3.8 3.7 3.9   CL 98 105 107   CO2 30 23 22   * 91* 99*   CREATININE 2.05* 1.84* 2.00*     No results for input(s): AST, ALT, ALB, BILIDIR, BILITOT, ALKPHOS in the last 72 hours. No results for input(s): LIPASE, AMYLASE in the last 72 hours. No results for input(s): PROTIME, INR in the last 72 hours. No results for input(s): PTT in the last 72 hours. No results for input(s): OCCULTBLD in the last 72 hours.   CEA:  No results found for: CEA  Ca 125:  No results found for:   Ca 19-9:  No results found for:   Ca 15-3:  No results found for:   AFP:  No components found for: AFAFP  Beta HCG:  No components found for: BHCG  Neuron Specific Enolase:  No results found for: NSE      Additional :    EGD colonoscopy tomorrow    Electronically signed by Felipe Irby MD

## 2022-11-23 ENCOUNTER — ANESTHESIA EVENT (OUTPATIENT)
Dept: ENDOSCOPY | Age: 67
DRG: 871 | End: 2022-11-23
Payer: MEDICARE

## 2022-11-23 ENCOUNTER — ANESTHESIA (OUTPATIENT)
Dept: ENDOSCOPY | Age: 67
DRG: 871 | End: 2022-11-23
Payer: MEDICARE

## 2022-11-23 LAB
ABSOLUTE EOS #: 0.17 K/UL (ref 0–0.4)
ABSOLUTE LYMPH #: 0.44 K/UL (ref 1–4.8)
ABSOLUTE MONO #: 0.78 K/UL (ref 0.1–1.3)
ANION GAP SERPL CALCULATED.3IONS-SCNC: 14 MMOL/L (ref 9–17)
BASOPHILS # BLD: 1 % (ref 0–2)
BASOPHILS ABSOLUTE: 0.09 K/UL (ref 0–0.2)
BUN BLDV-MCNC: 86 MG/DL (ref 8–23)
CALCIUM SERPL-MCNC: 8.7 MG/DL (ref 8.6–10.4)
CHLORIDE BLD-SCNC: 106 MMOL/L (ref 98–107)
CO2: 24 MMOL/L (ref 20–31)
CREAT SERPL-MCNC: 2.14 MG/DL (ref 0.7–1.2)
CULTURE: NORMAL
CULTURE: NORMAL
EOSINOPHILS RELATIVE PERCENT: 2 % (ref 0–4)
GFR SERPL CREATININE-BSD FRML MDRD: 33 ML/MIN/1.73M2
GLUCOSE BLD-MCNC: 158 MG/DL (ref 75–110)
GLUCOSE BLD-MCNC: 164 MG/DL (ref 75–110)
GLUCOSE BLD-MCNC: 183 MG/DL (ref 75–110)
GLUCOSE BLD-MCNC: 187 MG/DL (ref 75–110)
GLUCOSE BLD-MCNC: 198 MG/DL (ref 75–110)
GLUCOSE BLD-MCNC: 207 MG/DL (ref 70–99)
HCT VFR BLD CALC: 29.5 % (ref 41–53)
HCT VFR BLD CALC: 31.8 % (ref 41–53)
HEMOGLOBIN: 9.4 G/DL (ref 13.5–17.5)
HEMOGLOBIN: 9.6 G/DL (ref 13.5–17.5)
LYMPHOCYTES # BLD: 5 % (ref 24–44)
Lab: NORMAL
Lab: NORMAL
MAGNESIUM: 3.1 MG/DL (ref 1.6–2.6)
MCH RBC QN AUTO: 29.9 PG (ref 26–34)
MCHC RBC AUTO-ENTMCNC: 31.9 G/DL (ref 31–37)
MCV RBC AUTO: 93.7 FL (ref 80–100)
MONOCYTES # BLD: 9 % (ref 1–7)
MORPHOLOGY: ABNORMAL
NUCLEATED RED BLOOD CELLS: 1 PER 100 WBC
PDW BLD-RTO: 18 % (ref 11.5–14.9)
PLATELET # BLD: 155 K/UL (ref 150–450)
PMV BLD AUTO: 9.9 FL (ref 6–12)
POTASSIUM SERPL-SCNC: 3.4 MMOL/L (ref 3.7–5.3)
RBC # BLD: 3.15 M/UL (ref 4.5–5.9)
SEG NEUTROPHILS: 83 % (ref 36–66)
SEGMENTED NEUTROPHILS ABSOLUTE COUNT: 7.23 K/UL (ref 1.3–9.1)
SODIUM BLD-SCNC: 144 MMOL/L (ref 135–144)
SPECIMEN DESCRIPTION: NORMAL
SPECIMEN DESCRIPTION: NORMAL
WBC # BLD: 8.7 K/UL (ref 3.5–11)

## 2022-11-23 PROCEDURE — 99291 CRITICAL CARE FIRST HOUR: CPT | Performed by: INTERNAL MEDICINE

## 2022-11-23 PROCEDURE — 97530 THERAPEUTIC ACTIVITIES: CPT

## 2022-11-23 PROCEDURE — 6370000000 HC RX 637 (ALT 250 FOR IP): Performed by: INTERNAL MEDICINE

## 2022-11-23 PROCEDURE — 6360000002 HC RX W HCPCS

## 2022-11-23 PROCEDURE — 6370000000 HC RX 637 (ALT 250 FOR IP)

## 2022-11-23 PROCEDURE — 2500000003 HC RX 250 WO HCPCS: Performed by: INTERNAL MEDICINE

## 2022-11-23 PROCEDURE — 2580000003 HC RX 258: Performed by: INTERNAL MEDICINE

## 2022-11-23 PROCEDURE — 85018 HEMOGLOBIN: CPT

## 2022-11-23 PROCEDURE — 0DJ08ZZ INSPECTION OF UPPER INTESTINAL TRACT, VIA NATURAL OR ARTIFICIAL OPENING ENDOSCOPIC: ICD-10-PCS | Performed by: INTERNAL MEDICINE

## 2022-11-23 PROCEDURE — 6360000002 HC RX W HCPCS: Performed by: INTERNAL MEDICINE

## 2022-11-23 PROCEDURE — 3700000001 HC ADD 15 MINUTES (ANESTHESIA): Performed by: INTERNAL MEDICINE

## 2022-11-23 PROCEDURE — 82947 ASSAY GLUCOSE BLOOD QUANT: CPT

## 2022-11-23 PROCEDURE — 3700000000 HC ANESTHESIA ATTENDED CARE: Performed by: INTERNAL MEDICINE

## 2022-11-23 PROCEDURE — C9113 INJ PANTOPRAZOLE SODIUM, VIA: HCPCS | Performed by: INTERNAL MEDICINE

## 2022-11-23 PROCEDURE — 43235 EGD DIAGNOSTIC BRUSH WASH: CPT | Performed by: INTERNAL MEDICINE

## 2022-11-23 PROCEDURE — 7100000000 HC PACU RECOVERY - FIRST 15 MIN: Performed by: INTERNAL MEDICINE

## 2022-11-23 PROCEDURE — 80048 BASIC METABOLIC PNL TOTAL CA: CPT

## 2022-11-23 PROCEDURE — 85014 HEMATOCRIT: CPT

## 2022-11-23 PROCEDURE — 85025 COMPLETE CBC W/AUTO DIFF WBC: CPT

## 2022-11-23 PROCEDURE — 2060000000 HC ICU INTERMEDIATE R&B

## 2022-11-23 PROCEDURE — A4216 STERILE WATER/SALINE, 10 ML: HCPCS | Performed by: INTERNAL MEDICINE

## 2022-11-23 PROCEDURE — 6360000002 HC RX W HCPCS: Performed by: NURSE ANESTHETIST, CERTIFIED REGISTERED

## 2022-11-23 PROCEDURE — 2580000003 HC RX 258

## 2022-11-23 PROCEDURE — 2709999900 HC NON-CHARGEABLE SUPPLY: Performed by: INTERNAL MEDICINE

## 2022-11-23 PROCEDURE — 7100000001 HC PACU RECOVERY - ADDTL 15 MIN: Performed by: INTERNAL MEDICINE

## 2022-11-23 PROCEDURE — 83735 ASSAY OF MAGNESIUM: CPT

## 2022-11-23 PROCEDURE — 97110 THERAPEUTIC EXERCISES: CPT

## 2022-11-23 PROCEDURE — 36415 COLL VENOUS BLD VENIPUNCTURE: CPT

## 2022-11-23 PROCEDURE — 3609017100 HC EGD: Performed by: INTERNAL MEDICINE

## 2022-11-23 RX ORDER — MIDAZOLAM HYDROCHLORIDE 1 MG/ML
INJECTION INTRAMUSCULAR; INTRAVENOUS PRN
Status: DISCONTINUED | OUTPATIENT
Start: 2022-11-23 | End: 2022-11-23 | Stop reason: SDUPTHER

## 2022-11-23 RX ORDER — POTASSIUM CHLORIDE 20 MEQ/1
40 TABLET, EXTENDED RELEASE ORAL PRN
Status: DISCONTINUED | OUTPATIENT
Start: 2022-11-23 | End: 2022-11-25 | Stop reason: HOSPADM

## 2022-11-23 RX ORDER — POTASSIUM CHLORIDE 7.45 MG/ML
10 INJECTION INTRAVENOUS PRN
Status: DISCONTINUED | OUTPATIENT
Start: 2022-11-23 | End: 2022-11-25 | Stop reason: HOSPADM

## 2022-11-23 RX ADMIN — SODIUM CHLORIDE, PRESERVATIVE FREE 40 MG: 5 INJECTION INTRAVENOUS at 19:50

## 2022-11-23 RX ADMIN — QUETIAPINE FUMARATE 50 MG: 50 TABLET ORAL at 19:50

## 2022-11-23 RX ADMIN — HYDROCODONE BITARTRATE AND ACETAMINOPHEN 1 TABLET: 5; 325 TABLET ORAL at 02:10

## 2022-11-23 RX ADMIN — MIDAZOLAM 2 MG: 1 INJECTION INTRAMUSCULAR; INTRAVENOUS at 15:10

## 2022-11-23 RX ADMIN — SODIUM CHLORIDE: 4.5 INJECTION, SOLUTION INTRAVENOUS at 16:58

## 2022-11-23 RX ADMIN — POTASSIUM CHLORIDE 10 MEQ: 7.46 INJECTION, SOLUTION INTRAVENOUS at 11:14

## 2022-11-23 RX ADMIN — CLINDAMYCIN IN 5 PERCENT DEXTROSE 600 MG: 12 INJECTION, SOLUTION INTRAVENOUS at 12:50

## 2022-11-23 RX ADMIN — CEFEPIME 1000 MG: 1 INJECTION, POWDER, FOR SOLUTION INTRAMUSCULAR; INTRAVENOUS at 01:55

## 2022-11-23 RX ADMIN — POTASSIUM CHLORIDE 10 MEQ: 7.46 INJECTION, SOLUTION INTRAVENOUS at 18:47

## 2022-11-23 RX ADMIN — CEFEPIME 1000 MG: 1 INJECTION, POWDER, FOR SOLUTION INTRAMUSCULAR; INTRAVENOUS at 23:20

## 2022-11-23 RX ADMIN — CEFEPIME 1000 MG: 1 INJECTION, POWDER, FOR SOLUTION INTRAMUSCULAR; INTRAVENOUS at 11:42

## 2022-11-23 RX ADMIN — SODIUM CHLORIDE: 4.5 INJECTION, SOLUTION INTRAVENOUS at 13:51

## 2022-11-23 RX ADMIN — POTASSIUM CHLORIDE 10 MEQ: 7.46 INJECTION, SOLUTION INTRAVENOUS at 17:34

## 2022-11-23 RX ADMIN — POTASSIUM CHLORIDE 10 MEQ: 7.46 INJECTION, SOLUTION INTRAVENOUS at 09:59

## 2022-11-23 RX ADMIN — INSULIN GLARGINE 10 UNITS: 100 INJECTION, SOLUTION SUBCUTANEOUS at 09:38

## 2022-11-23 RX ADMIN — SERTRALINE HYDROCHLORIDE 50 MG: 50 TABLET ORAL at 08:58

## 2022-11-23 RX ADMIN — CLINDAMYCIN IN 5 PERCENT DEXTROSE 600 MG: 12 INJECTION, SOLUTION INTRAVENOUS at 06:28

## 2022-11-23 RX ADMIN — CLINDAMYCIN IN 5 PERCENT DEXTROSE 600 MG: 12 INJECTION, SOLUTION INTRAVENOUS at 21:00

## 2022-11-23 RX ADMIN — SODIUM CHLORIDE, PRESERVATIVE FREE 40 MG: 5 INJECTION INTRAVENOUS at 08:58

## 2022-11-23 ASSESSMENT — ENCOUNTER SYMPTOMS
STRIDOR: 0
DIARRHEA: 0
SHORTNESS OF BREATH: 0
RHINORRHEA: 0
ABDOMINAL PAIN: 0
VOMITING: 0
WHEEZING: 0
SHORTNESS OF BREATH: 0
CONSTIPATION: 0
COUGH: 0
STRIDOR: 0
NAUSEA: 0

## 2022-11-23 ASSESSMENT — PAIN - FUNCTIONAL ASSESSMENT: PAIN_FUNCTIONAL_ASSESSMENT: 0-10

## 2022-11-23 ASSESSMENT — LIFESTYLE VARIABLES: SMOKING_STATUS: 0

## 2022-11-23 NOTE — PLAN OF CARE
Problem: Discharge Planning  Goal: Discharge to home or other facility with appropriate resources  Outcome: Progressing     Problem: Chronic Conditions and Co-morbidities  Goal: Patient's chronic conditions and co-morbidity symptoms are monitored and maintained or improved  Outcome: Progressing     Problem: Safety - Medical Restraint  Goal: Remains free of injury from restraints (Restraint for Interference with Medical Device)  Description: INTERVENTIONS:  1. Determine that other, less restrictive measures have been tried or would not be effective before applying the restraint  2. Evaluate the patient's condition at the time of restraint application  3. Inform patient/family regarding the reason for restraint  4. Q2H: Monitor safety, psychosocial status, comfort, nutrition and hydration  Outcome: Progressing     Problem: Pain  Goal: Verbalizes/displays adequate comfort level or baseline comfort level  Outcome: Progressing     Problem: Skin/Tissue Integrity  Goal: Absence of new skin breakdown  Description: 1. Monitor for areas of redness and/or skin breakdown  2. Assess vascular access sites hourly  3. Every 4-6 hours minimum:  Change oxygen saturation probe site  4. Every 4-6 hours:  If on nasal continuous positive airway pressure, respiratory therapy assess nares and determine need for appliance change or resting period.   Outcome: Progressing     Problem: Safety - Adult  Goal: Free from fall injury  Outcome: Progressing     Problem: ABCDS Injury Assessment  Goal: Absence of physical injury  Outcome: Progressing     Problem: Nutrition Deficit:  Goal: Optimize nutritional status  Outcome: Progressing

## 2022-11-23 NOTE — PROGRESS NOTES
Contacted Kodi Rivera NP regarding consent. Patients brother lives in [place] but is available to give consent by phone.

## 2022-11-23 NOTE — ANESTHESIA POSTPROCEDURE EVALUATION
POST- ANESTHESIA EVALUATION       Pt Name: Misbah Kelsey  MRN: [id number]  YOB: 1955  Date of evaluation: 11/23/2022  Time:  4:05 PM      /84   Pulse 97   Temp 97.7 °F (36.5 °C)   Resp 20   Ht 6' 3\" (1.905 m)   Wt 151 lb 7.3 oz (68.7 kg)   SpO2 92%   BMI 18.93 kg/m²      Consciousness Level  Awake  Cardiopulmonary Status  Stable  Pain Adequately Treated YES  Nausea / Vomiting  NO  Adequate Hydration  YES  Anesthesia Related Complications NONE      Electronically signed by Angel Doran MD on 11/23/2022 at 4:05 PM       Department of Anesthesiology  Postprocedure Note    Patient: Misbah Kelsey  MRN: [id number]  YOB: 1955  Date of evaluation: 11/23/2022      Procedure Summary     Date: 11/23/22 [facility]    Anesthesia Start: 5677 Anesthesia Stop: 9597    Procedure: EGD ESOPHAGOGASTRODUODENOSCOPY (Esophagus) Diagnosis:       Coffee ground emesis      (COFFEE GROUND EMESIS)    Surgeons: Carol Cardenas MD Responsible Provider: Angel Doran MD    Anesthesia Type: General ASA Status: 4          Anesthesia Type: General    Neli Phase I: Neli Score: 8    Neli Phase II:        Anesthesia Post Evaluation

## 2022-11-23 NOTE — CARE COORDINATION
ONGOING DISCHARGE PLAN:    Patient is alert and oriented x4. Per previous CM/SW  note regarding discharge plan and patient confirms that plan is still to go back to [facility], no precert needed to return. Pt was to go for EGD but tube feeding did not get stopped. Procedure cancelled per GI. NPO. BUN 86 Creat 2.14 today down from 2.54 on admission. IV cefepime,Clindamycin. Will continue to follow for additional discharge needs.     Electronically signed by Pat Ham RN on 11/23/2022 at 9:49 AM

## 2022-11-23 NOTE — PROGRESS NOTES
[facility]    Date: 2022  Patient Name: Jen Monroe        : 1955       [] Pt Refusal           [x] Pt Unavailable due to:       Pt out of room for EGD at 2:25 pm    Mirlande Spear OT,   Date: 2022

## 2022-11-23 NOTE — PLAN OF CARE
Received perfect serve stating that tube feeding was not stopped until 7am this morning. Anesthesia notified. Egd cancelled. Dr El Hammans notified . Kiara Gutiérrez, APRN - CNP

## 2022-11-23 NOTE — CARE COORDINATION
DISCHARGE PLANNING NOTE:    Lindsey Alvares from [facility] called and patient does not need precert to return. If patient can return tomorrow call [place] [phone].   Electronically signed by Sean Bradford RN on 11/23/2022 at 4:20 PM

## 2022-11-23 NOTE — ANESTHESIA PRE PROCEDURE
Department of Anesthesiology  Preprocedure Note       Name:  Jose Davis   Age:  79 y.o.  :  1955                                          MRN:  [id number]         Date:  2022      Surgeon: Dia Duggan):  Norberto Holloway MD    Procedure: Procedure(s):  EGD BIOPSY    Medications prior to admission:   Prior to Admission medications    Medication Sig Start Date End Date Taking? Authorizing Provider   carvedilol (COREG) 6.25 MG tablet 6.25 mg by Per G Tube route 2 times daily   Yes Historical Provider, MD   sacubitril-valsartan (ENTRESTO) 24-26 MG per tablet 1 tablet by Per G Tube route 2 times daily   Yes Historical Provider, MD   furosemide (LASIX) 40 MG tablet 40 mg by Per G Tube route daily   Yes Historical Provider, MD   gabapentin (NEURONTIN) 100 MG capsule 200 mg by Per G Tube route at bedtime. 22 Yes Historical Provider, MD   HYDROcodone-acetaminophen (NORCO) 5-325 MG per tablet Take 1 tablet by mouth every 6 hours as needed for Pain.  Given per g-tube   Yes Historical Provider, MD   insulin lispro (HUMALOG) 100 UNIT/ML SOLN injection vial Inject into the skin 3 times daily (with meals) 351-400: 10 units, 301-350: 8 units, 351-300: 6 units, 201-250: 4 units, 151-200: 2 units   Yes Historical Provider, MD   magnesium oxide (MAG-OX) 400 MG tablet 400 mg by Per G Tube route 2 times daily   Yes Historical Provider, MD   sertraline (ZOLOFT) 50 MG tablet 50 mg by Per G Tube route daily   Yes Historical Provider, MD   warfarin (COUMADIN) 7.5 MG tablet 7.5 mg by Per G Tube route every evening   Yes Historical Provider, MD   bumetanide (BUMEX) 1 MG tablet 1 mg by Per G Tube route daily    Historical Provider, MD   senna-docusate (PERICOLACE) 8.6-50 MG per tablet 1 tablet by Per G Tube route every 12 hours as needed for Constipation    Historical Provider, MD   spironolactone (ALDACTONE) 25 MG tablet 25 mg by Per G Tube route daily    Historical Provider, MD   polyethylene glycol (GLYCOLAX) 17 GM/SCOOP powder 17 g by Per G Tube route daily    Historical Provider, MD       Current medications:    Current Facility-Administered Medications   Medication Dose Route Frequency Provider Last Rate Last Admin    [MAR Hold] potassium chloride (KLOR-CON M) extended release tablet 40 mEq  40 mEq Oral PRN Johnna Cooks, MD        Or   [facility] Hold] potassium bicarb-citric acid (EFFER-K) effervescent tablet 40 mEq  40 mEq Oral PRN Johnna Cooks, MD        Or   [facility] Hold] potassium chloride 10 mEq/100 mL IVPB (Peripheral Line)  10 mEq IntraVENous PRN Johnna Cooks, MD        [Held by provider] polyethylene glycol (COLYTE) solution 4,000 mL  4,000 mL Oral Once BARBARA Higgins - CNP        [MAR Hold] bisacodyl (DULCOLAX) EC tablet 5 mg  5 mg Oral Daily PRN BARBARA Higgins - CNP        [MAR Hold] QUEtiapine (SEROQUEL) tablet 50 mg  50 mg Oral Nightly Malinda Christopher MD   50 mg at 11/22/22 2051    [MAR Hold] insulin lispro (HUMALOG) injection vial 0-4 Units  0-4 Units SubCUTAneous Q6H BARBAR[facility] Hold] 0.45 % sodium chloride infusion   IntraVENous Continuous Amy Hodgson MD 35 mL/hr at 11/23/22 1351 New Bag at 11/23/22 1351    [MAR Hold] insulin glargine (LANTUS) injection vial 10 Units  10 Units SubCUTAneous BID Johnna Cooks, MD   10 Units at 11/23/22 0938    [MAR Hold] sodium chloride flush 0.9 % injection 5-40 mL  5-40 mL IntraVENous 2 times per day Nelly Garcia MD   10 mL at 11/22/22 2306    [MAR Hold] sodium chloride flush 0.9 % injection 5-40 mL  5-40 mL IntraVENous PRN Nelly Garcia MD        [MAR Hold] ondansetron (ZOFRAN-ODT) disintegrating tablet 4 mg  4 mg Oral Q8H PRN Nelly Garcia MD        Or    [MAR Hold] ondansetron (ZOFRAN) injection 4 mg  4 mg IntraVENous Q6H PRN Nelly Garcia MD   4 mg at 11/22/22 0130    [MAR Hold] polyethylene glycol (GLYCOLAX) packet 17 g  17 g Oral Daily PRN Nelly Garcia MD        [MAR Hold] acetaminophen (TYLENOL) tablet 650 mg  650 mg Oral Q6H PRN Biju Clemens MD   650 mg at 11/21/22 2035    Or    [MAR Hold] acetaminophen (TYLENOL) suppository 650 mg  650 mg Rectal Q6H PRN Biju Clemens MD        [MAR Hold] potassium chloride 10 mEq/100 mL IVPB (Peripheral Line)  10 mEq IntraVENous PRN Biju Clemens  mL/hr at 11/23/22 1114 10 mEq at 11/23/22 1114    [MAR Hold] cefepime (MAXIPIME) 1,000 mg in sodium chloride 0.9 % 50 mL IVPB (mini-bag)  1,000 mg IntraVENous Q12H Biju Clemens MD   Stopped at 11/23/22 1217    [MAR Hold] glucose chewable tablet 16 g  4 tablet Oral PRN Biju Clemens MD        [MAR Hold] dextrose bolus 10% 125 mL  125 mL IntraVENous PRN Biju Clemens MD        Or    [MAR Hold] dextrose bolus 10% 250 mL  250 mL IntraVENous PRN Biju Clemens MD        [MAR Hold] glucagon (rDNA) injection 1 mg  1 mg SubCUTAneous PRN Biju Clemens MD        [MAR Hold] dextrose 10 % infusion   IntraVENous Continuous PRN Biju Clemens MD        [Held by provider] bumetanide (BUMEX) tablet 1 mg  1 mg Per G Tube Daily Biju Clemens MD        [Held by provider] carvedilol (COREG) tablet 6.25 mg  6.25 mg Per G Tube BID Biju Clemens MD        [Held by provider] furosemide (LASIX) tablet 40 mg  40 mg Per G Tube Daily Biju Clemens MD        [Held by provider] gabapentin (NEURONTIN) capsule 200 mg  200 mg Per G Tube Nightly Biju Clemens MD        [MAR Hold] HYDROcodone-acetaminophen (NORCO) 5-325 MG per tablet 1 tablet  1 tablet Oral Q6H PRN Biju Clemens MD   1 tablet at 11/23/22 0210    [Held by provider] sacubitril-valsartan (ENTRESTO) 24-26 MG per tablet 1 tablet  1 tablet Per G Tube BID Biju Clemens MD        [Held by provider] spironolactone (ALDACTONE) tablet 25 mg  25 mg Per G Tube Daily Biju Clemens MD        [MAR Hold] sertraline (ZOLOFT) tablet 50 mg  50 mg Per G Tube Daily Biju Clemens MD   50 mg at 11/23/22 0858    [MAR Hold] clindamycin (CLEOCIN) 600 mg in dextrose 5 % 50 mL IVPB  600 mg IntraVENous Isidra Sherman S Honorio Deutsch MD   Stopped at 11/23/22 1345    [MAR Hold] heparin (porcine) injection 4,000 Units  4,000 Units IntraVENous Once Aiden Mujica MD        [Held by provider] heparin (porcine) injection 4,000 Units  4,000 Units IntraVENous PRN Aiden Mujica MD   4,000 Units at 11/19/22 1918    [Held by provider] heparin (porcine) injection 2,000 Units  2,000 Units IntraVENous PRN Aiden Mujica MD   2,000 Units at 11/20/22 0310    [Held by provider] heparin 25,000 units in dextrose 5% 250 mL (premix) infusion  5-30 Units/kg/hr IntraVENous Continuous Aiden Mujica MD   Stopped at 11/20/22 0604    [MAR Hold] pantoprazole (PROTONIX) 40 mg in sodium chloride (PF) 0.9 % 10 mL injection  40 mg IntraVENous Q12H Aiden Mujica MD   40 mg at 11/23/22 0858       Allergies:     Allergies   Allergen Reactions    Penicillins Rash       Problem List:    Patient Active Problem List   Diagnosis Code    Sepsis ([address] A41.9    Septic shock ([address] A41.9, R65.21    Acute respiratory failure with hypoxia and hypercapnia ([address] J96.01, J96.02    Aspiration pneumonia, unspecified aspiration pneumonia type, unspecified laterality, unspecified part of lung ([address] J69.0    Coffee ground emesis K92.0    Rectal bleeding K62.5       Past Medical History:        Diagnosis Date    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 ([address]     Acquired absence of right leg below knee ([address]     Acute kidney failure ([address]     Acute respiratory failure with hypoxia ([address]     Cerebral infarction due to embolism ([address]     Chronic kidney disease, unspecified     Chronic systolic (congestive) heart failure (HCC)     Depression     Heart failure ([address]     Hypertension     Muscle weakness (generalized)     Other symbolic dysfunctions     Peripheral vascular disease ([address]     Type 2 diabetes mellitus ([address]        Past Surgical History:        Procedure Laterality Date    LEG AMPUTATION BELOW KNEE Bilateral        Social History:    Social History Tobacco Use    Smoking status: Unknown    Smokeless tobacco: Not on file   Substance Use Topics    Alcohol use: Not on file                                Counseling given: Not Answered      Vital Signs (Current):   Vitals:    11/23/22 0210 11/23/22 0647 11/23/22 1221 11/23/22 1426   BP:  129/83 (!) 130/94 (!) 132/94   Pulse:  91 95 95   Resp: 19 19 19 14   Temp:  98.1 °F (36.7 °C) 97.4 °F (36.3 °C) 97.1 °F (36.2 °C)   TempSrc:  Axillary Axillary Infrared   SpO2:  96% 98% 100%   Weight: 151 lb 7.3 oz (68.7 kg)      Height:                                                  BP Readings from Last 3 Encounters:   11/23/22 (!) 132/94   09/28/22 (!) 78/59       NPO Status:                                                                                 BMI:   Wt Readings from Last 3 Encounters:   11/23/22 151 lb 7.3 oz (68.7 kg)   09/28/22 156 lb 8.4 oz (71 kg)   09/27/22 150 lb (68 kg)     Body mass index is 18.93 kg/m².     CBC:   Lab Results   Component Value Date/Time    WBC 8.7 11/23/2022 05:42 AM    RBC 3.15 11/23/2022 05:42 AM    HGB 9.4 11/23/2022 05:42 AM    HCT 29.5 11/23/2022 05:42 AM    MCV 93.7 11/23/2022 05:42 AM    RDW 18.0 11/23/2022 05:42 AM     11/23/2022 05:42 AM     HB 9.4, K 3.4    CMP:   Lab Results   Component Value Date/Time     11/23/2022 05:42 AM    K 3.4 11/23/2022 05:42 AM     11/23/2022 05:42 AM    CO2 24 11/23/2022 05:42 AM    BUN 86 11/23/2022 05:42 AM    CREATININE 2.14 11/23/2022 05:42 AM    GFRAA 33 09/28/2022 04:04 AM    LABGLOM 33 11/23/2022 05:42 AM    GLUCOSE 207 11/23/2022 05:42 AM    PROT 7.6 11/18/2022 12:25 PM    CALCIUM 8.7 11/23/2022 05:42 AM    BILITOT 0.3 11/18/2022 12:25 PM    ALKPHOS 132 11/18/2022 12:25 PM    AST 18 11/18/2022 12:25 PM    ALT 19 11/18/2022 12:25 PM       POC Tests:   Recent Labs     11/23/22  1201   POCGLU 164*       Coags:   Lab Results   Component Value Date/Time    PROTIME 18.2 11/18/2022 07:45 PM    INR 1.5 11/18/2022 07:45 PM APTT 29.7 11/18/2022 07:45 PM       HCG (If Applicable): No results found for: PREGTESTUR, PREGSERUM, HCG, HCGQUANT     ABGs:   Lab Results   Component Value Date/Time    PHART 7.412 11/20/2022 04:22 AM    PO2ART 110.0 11/20/2022 04:22 AM    TAT3ZDE 49.7 11/20/2022 04:22 AM    MRH4QBV 31.7 11/20/2022 04:22 AM    J4HEXFIY 97.7 11/20/2022 04:22 AM        Type & Screen (If Applicable):  No results found for: LABABO, LABRH    Drug/Infectious Status (If Applicable):  Lab Results   Component Value Date/Time    HEPCAB NONREACTIVE 10/23/2022 04:28 AM       COVID-19 Screening (If Applicable):   Lab Results   Component Value Date/Time    COVID19 Not Detected 11/09/2022 09:17 PM           Anesthesia Evaluation  Patient summary reviewed and Nursing notes reviewed  Airway: Mallampati: III  TM distance: >3 FB   Neck ROM: full  Mouth opening: > = 3 FB   Dental:    (+) poor dentition      Pulmonary:normal exam  breath sounds clear to auscultation  (+) pneumonia:      (-) COPD, asthma, shortness of breath, recent URI, sleep apnea, rhonchi, wheezes, rales, stridor, not a current smoker and no decreased breath sounds                           Cardiovascular:  Exercise tolerance: poor (<4 METS),   (+) hypertension: no interval change, CHF: no interval change,     (-) pacemaker, valvular problems/murmurs, past MI, CAD, CABG/stent, dysrhythmias,  angina, orthopnea, PND,  DONG, murmur, weak pulses,  friction rub, systolic click, carotid bruit,  JVD, peripheral edema, no pulmonary hypertension and no hyperlipidemia    ECG reviewed  Rhythm: regular  Rate: normal  Echocardiogram reviewed         Beta Blocker:  Dose within 24 Hrs         Neuro/Psych:   (+) CVA: no interval change, psychiatric history: stable with treatmentdepression/anxiety    (-) seizures, neuromuscular disease, TIA and headaches           GI/Hepatic/Renal: Neg GI/Hepatic/Renal ROS  (+) renal disease: CRI,      (-) hiatal hernia, GERD, PUD, hepatitis, liver disease, bowel

## 2022-11-23 NOTE — PROGRESS NOTES
Contacted Sonya Collier NP regarding patient's tube feeding stopped at 0700 and egd is scheduled for 1200 today. Waiting for response.

## 2022-11-23 NOTE — PROGRESS NOTES
NEPHROLOGY PROGRESS NOTE    Patient :  Misbah Kelsey; 79 y.o. MRN# [id number]  Location:  2098/2098-01  Attending:  Cristina Kim MD  Admit Date:  11/18/2022   Hospital Day: 5    Reason for consultation: Management of acute kidney injury. Consulting physician: Cristina Kim MD.    Interval history: Patient was seen and examined today  He is awake but aphasic. He does not appear to be in acute respiratory distress. He has been started on clear liquids -Laboratory study results were reviewed-BUN/creatinine 86  and 2.14 mg/dL. He is nonoliguric. History of Present Illness: This is a 79 y.o. 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 Patient had an episode of vomiting and developed shortness of breath and therefore aspiration pneumonia suspected patient was noted to be hypoxic with pulse ox in 70s on room air.   Patient initially was placed on nonrebreather and then patient was intubated last night currently on ventilator  Patient also noted to be hypotensive, on Levophed  Labs reviewed, serum creatinine on admission BUN was noted to be 139 and creatinine 2.5 mg/dL, patient's last serum creatinine on November 11, 2022 was 1.2 mg/dL  Patient is nonoliguric urine output 650 mL overnight    Current Medications:    potassium chloride (KLOR-CON M) extended release tablet 40 mEq, PRN   Or  potassium bicarb-citric acid (EFFER-K) effervescent tablet 40 mEq, PRN   Or  potassium chloride 10 mEq/100 mL IVPB (Peripheral Line), PRN  [Held by provider] polyethylene glycol (COLYTE) solution 4,000 mL, Once  bisacodyl (DULCOLAX) EC tablet 5 mg, Daily PRN  QUEtiapine (SEROQUEL) tablet 50 mg, Nightly  insulin lispro (HUMALOG) injection vial 0-4 Units, Q6H  0.45 % sodium chloride infusion, Continuous  insulin glargine (LANTUS) injection vial 10 Units, BID  sodium chloride flush 0.9 % injection 5-40 mL, 2 times per day  sodium chloride flush 0.9 % injection 5-40 mL, PRN  ondansetron (ZOFRAN-ODT) disintegrating tablet 4 mg, Q8H PRN   Or  ondansetron (ZOFRAN) injection 4 mg, Q6H PRN  polyethylene glycol (GLYCOLAX) packet 17 g, Daily PRN  acetaminophen (TYLENOL) tablet 650 mg, Q6H PRN   Or  acetaminophen (TYLENOL) suppository 650 mg, Q6H PRN  potassium chloride 10 mEq/100 mL IVPB (Peripheral Line), PRN  cefepime (MAXIPIME) 1,000 mg in sodium chloride 0.9 % 50 mL IVPB (mini-bag), Q12H  glucose chewable tablet 16 g, PRN  dextrose bolus 10% 125 mL, PRN   Or  dextrose bolus 10% 250 mL, PRN  glucagon (rDNA) injection 1 mg, PRN  dextrose 10 % infusion, Continuous PRN  [Held by provider] bumetanide (BUMEX) tablet 1 mg, Daily  [Held by provider] carvedilol (COREG) tablet 6.25 mg, BID  [Held by provider] furosemide (LASIX) tablet 40 mg, Daily  [Held by provider] gabapentin (NEURONTIN) capsule 200 mg, Nightly  HYDROcodone-acetaminophen (NORCO) 5-325 MG per tablet 1 tablet, Q6H PRN  [Held by provider] sacubitril-valsartan (ENTRESTO) 24-26 MG per tablet 1 tablet, BID  [Held by provider] spironolactone (ALDACTONE) tablet 25 mg, Daily  sertraline (ZOLOFT) tablet 50 mg, Daily  clindamycin (CLEOCIN) 600 mg in dextrose 5 % 50 mL IVPB, Q8H  heparin (porcine) injection 4,000 Units, Once  [Held by provider] heparin (porcine) injection 4,000 Units, PRN  [Held by provider] heparin (porcine) injection 2,000 Units, PRN  [Held by provider] heparin 25,000 units in dextrose 5% 250 mL (premix) infusion, Continuous  pantoprazole (PROTONIX) 40 mg in sodium chloride (PF) 0.9 % 10 mL injection, Q12H    Objective:  CURRENT TEMPERATURE:  Temp: 97.4 °F (36.3 °C)  MAXIMUM TEMPERATURE OVER 24HRS:  Temp (24hrs), Av.9 °F (36.6 °C), Min:97.4 °F (36.3 °C), Max:98.3 °F (36.8 °C)    CURRENT RESPIRATORY RATE:  Resp: 19  CURRENT PULSE:  Heart Rate: 95  CURRENT BLOOD PRESSURE:  BP: (!) 130/94  24HR BLOOD PRESSURE RANGE:  Systolic (17ORJ), FVQ:874 , Min:112 , HWO:627   ; Diastolic (41HQL), OX, Min:74, Max:94    24HR INTAKE/OUTPUT:    Intake/Output Summary (Last 24 hours) at 2022 1300  Last data filed at 2022 1011  Gross per 24 hour   Intake 1903.15 ml   Output 1275 ml   Net 628.15 ml       Patient Vitals for the past 96 hrs (Last 3 readings):   Weight   22 0210 151 lb 7.3 oz (68.7 kg)   22 0545 151 lb 10.8 oz (68.8 kg)       Physical Exam:  GENERAL APPEARANCE: Awake and alert but with expressive aphasia. HEAD: Normocephalic and atraumatic  EYES:  EOMI. Not pale, anicteric   CARDIAC: Normal S1 and S2. No S3, S4 or murmurs. Rhythm is regular. LUNGS:diminished breath sounds. Coarse breath sounds no accessory muscle use  ABDOMEN: With PEG tube in situ. Normal bowel sounds. EXTREMITIES: No edema. Bilateral BKA  NEURO: Nonfocal    Labs:   CBC:  Recent Labs     22  0423 22  1222 22  0507 22  1614 22  0542   WBC 8.1  --  11.4*  --  8.7   RBC 2.30*  --  2.98*  --  3.15*   HGB 7.0*   < > 8.8* 9.1* 9.4*   HCT 22.2*   < > 28.1* 30.0* 29.5*   MCV 96.5  --  94.2  --  93.7   MCH 30.3  --  29.7  --  29.9   MCHC 31.4  --  31.5  --  31.9   RDW 16.6*  --  18.6*  --  18.0*   *  --  149*  --  155   MPV 9.8  --  10.1  --  9.9    < > = values in this interval not displayed.         BMP:   Recent Labs     22  0423 22  0507 22  0542   * 149* 144 K 3.7 3.9 3.4*    107 106   CO2 23 22 24   BUN 91* 99* 86*   CREATININE 1.84* 2.00* 2.14*   GLUCOSE 192* 145* 207*   CALCIUM 8.9 9.1 8.7        Phosphorus:  No results for input(s): PHOS in the last 72 hours. Magnesium:   Recent Labs     11/23/22  0542   MG 3.1*       Albumin:   No results for input(s): LABALBU in the last 72 hours. \  No results for input(s): PROT, ALBCAL, ALBCAL, ALBPCT, LABALPH, A1PCT, LABALPH, A2PCT, LABBETA, BETAPCT, GAMGLOB, GGPCT, PATH in the last 72 hours. No results for input(s): VÍCTOR in the last 72 hours. Urine Creatinine:    No results for input(s): LABCREA in the last 72 hours. Urinalysis:    No results for input(s): Catrina Berliner, PHUR, LABCAST, WBCUA, RBCUA, MUCUS, TRICHOMONAS, YEAST, BACTERIA, CLARITYU, SPECGRAV, LEUKOCYTESUR, UROBILINOGEN, BILIRUBINUR, BLOODU, GLUCOSEU, KETUA, AMORPHOUS in the last 72 hours. Assessment/plan:    1. Acute kidney injury on chronic kidney disease stage 3-acute kidney injury in this patient is most consistent with ischemic acute tubular necrosis secondary to hypotension, prerenal azotemia from diuretics, Entresto and Aldactone. He is nonoliguric [Urine output of 1235 mls  cc last 24 hours]. Plan: There are no indications for acute hemodialysis at this time. Taper IV fluids  Monitor urine output closely. Continue to hold diuretics, Entresto and spironolactone. Basic metabolic profile daily. 2.  Hypernatremia -improving     3. Heart failure with reduced ejection fraction [HFrEF with LVEF 15 to 20%] - patient will  need diuretics to maintain euvolemia-possibly restart low-dose Lasix in the next day . Continue to hold Entresto    4. Hypokalemia-IV potassium replacement  Prognosis is guarded.     Electronically signed by Tanja Matthews MD on 11/23/2022 at 1:00 PM

## 2022-11-23 NOTE — PROGRESS NOTES
Physical Therapy  Facility/Department: [facility] PROGRESSIVE CARE  Physical Therapy Progress Note. Name: Misbah Kelsey  : 1955  MRN: [id number]  Date of Service: 2022    Discharge Recommendations:  Patient would benefit from continued therapy after discharge          Patient Diagnosis(es): The primary encounter diagnosis was Acute respiratory failure with hypoxia and hypercapnia ([address] A diagnosis of Pneumonia of both lower lobes due to infectious organism was also pertinent to this visit. 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 Past Surgical History:  has a past surgical history that includes Leg amputation below knee (Bilateral). Assessment   Assessment: This is a 79 y.o. male with past medical history of type 2 diabetes insulin-dependent diabetes mellitus, history of CVA, history of autism, history of CHF with reduced EF of 10 to 15%, history of bilateral BKA, left BKA on 2022, and had revision on 10/4/2022 ,  recent history of acute kidney injury requiring transient hemodialysis in 2022. Tien Maki Pt has difficulty following commands, weakenss R side > Left side. Pt required max A assist to dangle at EOB with fair  sitting balance. Will continue POC.   Therapy Prognosis: Fair  Decision Making: Medium Complexity  History: B BKA, CVA, Low EF%  Requires PT Follow-Up: Yes  Activity Tolerance  Activity Tolerance: Patient limited by fatigue;Patient limited by endurance     Plan   Physcial Therapy Plan  General Plan: 3-5 times per week  Current Treatment Recommendations: Strengthening, Balance training, ROM, Functional mobility training, Endurance training, Safety education & training, Patient/Caregiver education & training, Therapeutic activities  Safety Devices  Type of Devices: All fall risk precautions in place, Bed alarm in place, Call light within reach, Patient at risk for falls, Left in bed, Nurse notified     Restrictions  Restrictions/Precautions  Restrictions/Precautions: Fall Risk, General Precautions (PEG Tube, pt aphasic)  Required Braces or Orthoses?: Yes  Required Braces or Orthoses  Other: Abdominal Binder (2* PEG Tube)  Position Activity Restriction  Other position/activity restrictions: Bilateral BKA     Subjective   General  Patient assessed for rehabilitation services?: Yes  Additional Pertinent Hx: 79 y.o. male with past medical history of type 2 diabetes insulin-dependent diabetes mellitus, history of CVA, history of autism, history of CHF with reduced EF of 10 to 15%, history of bilateral BKA, left BKA on 9/29/2022, and had revision on 10/4/2022. Patient was sent from nursing home due to shortness of breath and concerns for aspiration pneumonia, patient is on tube feeding. Patient had an episode of vomiting and developed shortness of breath and therefore aspiration pneumonia suspected patient was noted to be hypoxic with pulse ox in 70s on room air. Referral Date : 11/18/22  Diagnosis: Acute respiratory failure with hypoxia and hypercapnia  Follows Commands: Impaired  Subjective  Subjective: CORWIN PARKERs PT. Pt has aphasia, but able to let therpaist know\"i want to lay on right side side         Social/Functional History  Social/Functional History  Type of Home: Facility ([facility])  Home Layout: One level  Home Access: Level entry  Additional Comments: Pt is unable to provide PLOF and home set-up.   Vision/Hearing  Hearing  Hearing: Within functional limits    Cognition   Orientation  Overall Orientation Status: Impaired  Cognition  Overall Cognitive Status: Exceptions  Arousal/Alertness: Inconsistent responses to stimuli;Delayed responses to stimuli  Following Commands: Inconsistently follows commands  Safety Judgement: Decreased awareness of need for safety  Initiation: Requires cues for all  Sequencing: Requires cues for all     Objective   O2 Device: None (Room air)     Observation/Palpation  Posture: Fair  Observation: parson catheter, B BKA        AROM RLE (degrees)  RLE General AROM: AAROM WFL-Hip/knee-BKA  AROM LLE (degrees)  LLE General AROM: AAROM - WFL at Hip/knee-BKA              Bed mobility  Rolling to Left: Moderate assistance  Rolling to Right: Moderate assistance  Supine to Sit: Maximum assistance  Sit to Supine: Maximum assistance  Scooting: Moderate assistance;2 Person assistance (towards EOB while sitting)  Bed Mobility Comments: HOB slightly elevated 30 degrees  and with use of hand rails. Pt sat EOB for approx. 6 to 7 minutes with noted slight posterior lean. Cues for posture, min to mod A to maintain seated balance. 2nd person SBA for safety during mobility. PCA assisted with hair combing while pt sitting at EOB and therapist working n trunk stability. Assited to supine and positioned pt slightly on right side bt offloading Left coocyx wiht pillow. HOB locked at 35 degrees due to feeling tube. Balance  Posture: Fair  Sitting - Static: Fair;-  Sitting - Dynamic: Poor;+  A/AROM Exercises: Pt brandyn to perform supine B SLR, hip abduction, seated LAQ's with max cues 5 to 8 reps each.   Disease-specific Exercises: Re-wrapped Left BKE with ace wrap for edema controll and proper shapping for residual limb        OutComes Score                                                  AM-PAC Score  AM-PAC Inpatient Mobility Raw Score : 8 (11/23/22 1234)  AM-PAC Inpatient T-Scale Score : 28.52 (11/23/22 1234)  Mobility Inpatient CMS 0-100% Score: 86.62 (11/23/22 1234)  Mobility Inpatient CMS G-Code Modifier : CM (11/23/22 2594)          Tinneti Score       Goals  Short Term Goals  Time Frame for Short Term Goals: 10 visits  Short Term Goal 1: Pt able to roll side to side at min/mod A  Short Term Goal 2: Pt able to perform supine<>sit at mod A  Short Term Goal 3: Improve sitting balance at EOB at min A  Short Term Goal 4: Tolerate sitting at EOB for 10 minutes to improve sitting toerance and balance.   Patient Goals   Patient Goals : Unable to state       Education  Patient Education  Education Given To: Patient  Education Provided: Role of Therapy;Transfer Training  Education Method: Demonstration;Verbal  Barriers to Learning: Cognition  Education Outcome: Continued education needed      Therapy Time   Individual Concurrent Group Co-treatment   Time In 0913         Time Out 0937         Minutes 24         Timed Code Treatment Minutes: 24 Minutes       Chad Medley PT

## 2022-11-23 NOTE — PROGRESS NOTES
Comprehensive Nutrition Assessment    Type and Reason for Visit:  Reassess    Nutrition Recommendations/Plan:   When tube feed is resumed, recommend Nepro at 50 ml per hour (2124 kcal, 97 g protein, 872 ml free water)     Malnutrition Assessment:  Malnutrition Status:  Mild malnutrition (11/23/22 1245)    Context:  Acute Illness     Findings of the 6 clinical characteristics of malnutrition:  Energy Intake:  50% or less of estimated energy requirements for 5 or more days  Weight Loss:  Unable to assess     Body Fat Loss: Moderate body fat loss Triceps   Muscle Mass Loss:  Mild muscle mass loss Hand (interosseous)  Fluid Accumulation:  No significant fluid accumulation     Strength:  Not Performed    Nutrition Assessment:    Tube feed is presently off for EGD. Pt was previously tolerating Nepro at 40 ml per hour (1700 kcal, 78 g protein)    Nutrition Related Findings:    no edema, labs: Glu 207, K 3.4, (11/7) Phos 4.7, Meds: Reviewed, Wound Type: Multiple       Current Nutrition Intake & Therapies:    Average Meal Intake: NPO     Diet NPO    Anthropometric Measures:  Height: 6' 3\" (190.5 cm)  Ideal Body Weight (IBW): 196 lbs (89 kg)    Admission Body Weight: 151 lb (68.5 kg)  Current Body Weight: 151 lb (68.5 kg), 77 % IBW.  Weight Source: Bed Scale  Current BMI (kg/m2): 18.9        Weight Adjustment For: Amputation  Total Adjusted Percentage (Calculated): 5.9  Adjusted Ideal Body Weight (lbs) (Calculated): 184.4 lbs  Adjusted Ideal Body Weight (kg) (Calculated): 83.82 kg  Adjusted % Ideal Body Weight (Calculated): 81.9  Adjusted BMI (kg/m2) (Calculated): 20  BMI Categories: Underweight (BMI less than 22) age over 72    Estimated Daily Nutrient Needs:  Energy Requirements Based On: Kcal/kg  Weight Used for Energy Requirements: Admission  Energy (kcal/day): 5517-9155 kcals based on 30-32 kcals/kg  Weight Used for Protein Requirements: Admission  Protein (g/day):  gm protein based on 1.4-1.6 gm/kg Nutrition Diagnosis:   Increased nutrient needs related to other (comment) (healing) as evidenced by wounds    Nutrition Interventions:   Food and/or Nutrient Delivery: Start Tube Feeding  Nutrition Education/Counseling: No recommendation at this time  Coordination of Nutrition Care: Continue to monitor while inpatient       Goals:  Previous Goal Met: Progressing toward Goal(s)  Goals: Meet at least 75% of estimated needs       Nutrition Monitoring and Evaluation:   Behavioral-Environmental Outcomes: None Identified  Food/Nutrient Intake Outcomes: Enteral Nutrition Intake/Tolerance  Physical Signs/Symptoms Outcomes: Biochemical Data, GI Status, Fluid Status or Edema, Skin, Weight    Discharge Planning:    Enteral Nutrition     Bernadine Lundborg, [address] [place] [address],   Contact: [phone]

## 2022-11-23 NOTE — PROGRESS NOTES
Pulmonary Progress Note  [facility]      Patient - Scott Hollingsworth,  Age - 79 y.o.    - 1955      Room Number - 2098/2098-01   [facility] -  [id number]   [facility]  Date of Admission -  2022 11:46 AM        Consulting Mahendra Cruz MD  Primary Care Physician - PROVIDER Chante Hua MD     SUBJECTIVE   Resting comfortably, apparently endoscopy canceled for today    OBJECTIVE   VITALS    height is 6' 3\" (1.905 m) and weight is 151 lb 7.3 oz (68.7 kg). His axillary temperature is 98.1 °F (36.7 °C). His blood pressure is 129/83 and his pulse is 91. His respiration is 19 and oxygen saturation is 96%. Body mass index is 18.93 kg/m². Temperature Range: Temp: 98.1 °F (36.7 °C) Temp  Av.1 °F (36.7 °C)  Min: 97.9 °F (36.6 °C)  Max: 98.3 °F (36.8 °C)  BP Range:  Systolic (03DWV), MDJ:307 , Min:112 , XJF:908     Diastolic (20VJK), VRR:09, Min:74, Max:97    Pulse Range: Pulse  Av  Min: 86  Max: 96  Respiration Range: Resp  Av.5  Min: 18  Max: 25  Current Pulse Ox[de-identified]  SpO2: 96 %  24HR Pulse Ox Range:  SpO2  Av.5 %  Min: 95 %  Max: 100 %  Oxygen Amount and Delivery: O2 Flow Rate (L/min): 2 L/min    Wt Readings from Last 3 Encounters:   22 151 lb 7.3 oz (68.7 kg)   22 156 lb 8.4 oz (71 kg)   22 150 lb (68 kg)       I/O (24 Hours)    Intake/Output Summary (Last 24 hours) at 2022 1155  Last data filed at 2022 1011  Gross per 24 hour   Intake 1903.15 ml   Output 1275 ml   Net 628.15 ml       EXAM     General Appearance  Awake, alert, has expressive aphasia in no acute distress  HEENT - normocephalic, atraumatic.  []  Mallampati  [] Crowded airway   [] Macroglossia  []  Retrognathia  [] Micrognathia  []  Normal tongue size []  Normal Bite  [] Vilas sign positive    Neck - Supple,  trachea midline   Lungs -diminished breath sound  Cardiovascular - Heart sounds are normal. Regular rate and rhythm   Abdomen - Soft, nontender, nondistended, no masses or organomegaly  Neurologic - There are no focal motor or sensory deficits  Skin - No bruising or bleeding  Extremities - No clubbing, cyanosis, edema undergoing bilateral BKA    MEDS      [Held by provider] polyethylene glycol  4,000 mL Oral Once    QUEtiapine  50 mg Oral Nightly    insulin lispro  0-4 Units SubCUTAneous Q6H    insulin glargine  10 Units SubCUTAneous BID    sodium chloride flush  5-40 mL IntraVENous 2 times per day    cefepime  1,000 mg IntraVENous Q12H    [Held by provider] bumetanide  1 mg Per G Tube Daily    [Held by provider] carvedilol  6.25 mg Per G Tube BID    [Held by provider] furosemide  40 mg Per G Tube Daily    [Held by provider] gabapentin  200 mg Per G Tube Nightly    [Held by provider] sacubitril-valsartan  1 tablet Per G Tube BID    [Held by provider] spironolactone  25 mg Per G Tube Daily    sertraline  50 mg Per G Tube Daily    clindamycin (CLEOCIN) IV  600 mg IntraVENous Q8H    heparin (porcine)  4,000 Units IntraVENous Once    pantoprazole (PROTONIX) 40 mg injection  40 mg IntraVENous Q12H      sodium chloride 50 mL/hr at 11/23/22 0623    dextrose      [Held by provider] heparin (PORCINE) Infusion Stopped (11/20/22 0604)     potassium chloride **OR** potassium alternative oral replacement **OR** potassium chloride, bisacodyl, sodium chloride flush, ondansetron **OR** ondansetron, polyethylene glycol, acetaminophen **OR** acetaminophen, potassium chloride, glucose, dextrose bolus **OR** dextrose bolus, glucagon (rDNA), dextrose, HYDROcodone-acetaminophen, [Held by provider] heparin (porcine), [Held by provider] heparin (porcine)    LABS   CBC   Recent Labs     11/23/22 0542   WBC 8.7   HGB 9.4*   HCT 29.5*   MCV 93.7        BMP:   Lab Results   Component Value Date/Time     11/23/2022 05:42 AM    K 3.4 11/23/2022 05:42 AM     11/23/2022 05:42 AM    CO2 24 11/23/2022 05:42 AM    BUN 86 11/23/2022 05:42 AM    LABALBU 3.6 11/18/2022 12:25 PM    CREATININE 2.14 11/23/2022 05:42 AM    CALCIUM 8.7 11/23/2022 05:42 AM    GFRAA 33 09/28/2022 04:04 AM    LABGLOM 33 11/23/2022 05:42 AM     ABGs:  Lab Results   Component Value Date/Time    PHART 7.412 11/20/2022 04:22 AM    PO2ART 110.0 11/20/2022 04:22 AM    IUP2DNR 49.7 11/20/2022 04:22 AM      Lab Results   Component Value Date/Time    MODE PRVC 11/20/2022 04:22 AM     Ionized Calcium:  No results found for: IONCA  Magnesium:    Lab Results   Component Value Date/Time    MG 3.1 11/23/2022 05:42 AM     Phosphorus:    Lab Results   Component Value Date/Time    PHOS 4.7 11/07/2022 03:50 AM        LIVER PROFILE No results for input(s): AST, ALT, LIPASE, BILIDIR, BILITOT, ALKPHOS in the last 72 hours. Invalid input(s): AMYLASE,  ALB  INR No results for input(s): INR in the last 72 hours.   PTT   Lab Results   Component Value Date    APTT 29.7 11/18/2022         RADIOLOGY     CT of chest shows significant improvement in cavitation in left upper lobe, still has left lower lobe atelectasis/collapse but no worse  New his CAT scan is on the left, old [facility] CAT scan from October on the right      ASSESSMENT/PLAN   Acute hypoxic respiratory failure, intubated 11/18; extubated 11/20-resolved  Shock, presumed sepsis due to aspiration pneumonia-resolved  Bilateral aspiration pneumonia  History of cavitary nodules/lesions  Possible GI bleed  Bilateral BKA recent left BKA 9/29/2022  Cardiomyopathy EF 10 to 15%  Type 2 diabetes  History of embolic stroke  Aphasia  Acute on chronic kidney disease, did have dialysis while at [facility]      Continue cefepime and clindamycin, he is obviously responding to treatment for his cavitary lesion/pneumonia  Okay to proceed with EGD, will be at moderate risk due to comorbid illnesses watch for oversedation  No plans for bronchoscopy at this juncture  Electronically signed by Mattie Lowery MD on 11/23/2022 at 11:55 AM

## 2022-11-23 NOTE — PROGRESS NOTES
[address]    PROGRESS NOTE             11/23/2022    8:29 AM    Name:   Ange Lin  MRN:     [id number]     Acct:      [de-identified]   Room:   2098/2098-01  IP Day:  5  Admit Date:  11/18/2022 11:46 AM    PCP:  PROVIDER Bony Bhatti MD  Code Status:  Full Code    Subjective:     C/C:   Chief Complaint   Patient presents with    Shortness of Breath     Interval History Status: improved. The patient was seen and examined at the bedside. NO acute overnight events as per patient and RN. Patient endorsed poor sleep. Patient is vitally and hemodynamically stable. HR 92, /83, saturating 96% on room air (down form 2 L)      Hgb improved after blood transfusion  (9.4 today). GI consulted for evaluation of possible  GI bleed. EGD planned for today, but was cancelled due to tube feeds not being stopped until this morning. K+ low today (3.4). Patient still has [place]. Creatinine trending down (2.14). Cr was 2.54 on admission (1.3 baseline). Blood glucose well controlled on 10 U Lantus.     -Nephrology consulted (EDELMIRA most consistent with ischemic ATN 2/2 to hypotension, pre-renal azotemia; no indications for hemodialysis at this time; continue IVF at 50 mls/hr, hold diuretics, Entresto, spironolactone; continue free water flushes for hypernatremia. Brief History:      The patient is a 79 y.o. 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 The nursing staff had concerns for aspiration. His oxygen saturation dropped to the 70s. The patient was transferred to [address] [place] [address] for evaluation.      In the ED, the patient was tachycardic (106), tachypneic (RR 25), and hypoxic (02 saturation in 70s). The patient was started on a 100% non re-breather. The patient was then weaned down to a 10 L salter (02 saturation 98%). The salter was then decreased to 8L. Chest x-ray showed bibasilar pulmonary opacities suggesting pneumonia. The patient was started on Cefepime and Clindamycin. EKG showed sinus tachycardia with 1st degree AV block, left anterior fascicular block, and anterolateral infarct (age undetermined). WBCS were elevated at 16.7. Hgb was 11.3. ABGs showed pH 7.405, pC02 61.4, p02 81.2, Hc03 38.4. Blood cultures were drawn and results are pending. The patient was given normal saline bolus. The patient was admitted for acute hypoxic, hypercapnic respiratory failure secondary to suspected bilateral aspiration pneumonia    Review of Systems:     Review of Systems   Constitutional:  Negative for activity change, chills, fatigue and fever. HENT:  Negative for congestion and rhinorrhea. Respiratory:  Negative for cough, shortness of breath, wheezing and stridor. Cardiovascular:  Negative for chest pain, palpitations and leg swelling. Gastrointestinal:  Negative for abdominal pain, constipation, diarrhea, nausea and vomiting. Endocrine: Negative for cold intolerance and heat intolerance. Musculoskeletal:  Negative for arthralgias and myalgias. Skin:  Negative for pallor and rash. Neurological:  Negative for dizziness, tremors, syncope, weakness, light-headedness and headaches. Psychiatric/Behavioral:  Negative for agitation and confusion. Medications: Allergies:     Allergies   Allergen Reactions    Penicillins Rash       Current Meds:   Scheduled Meds:    QUEtiapine  50 mg Oral Nightly    insulin lispro  0-4 Units SubCUTAneous Q6H    insulin glargine  10 Units SubCUTAneous BID    sodium chloride flush  5-40 mL IntraVENous 2 times per day    cefepime  1,000 mg IntraVENous Q12H    [Held by provider] bumetanide  1 mg Per G Tube Daily    [Held by provider] carvedilol  6.25 mg Per G Tube BID    [Held by provider] furosemide  40 mg Per G Tube Daily    [Held by provider] gabapentin  200 mg Per G Tube Nightly    [Held by provider] sacubitril-valsartan  1 tablet Per G Tube BID    [Held by provider] spironolactone  25 mg Per G Tube Daily    sertraline  50 mg Per G Tube Daily    clindamycin (CLEOCIN) IV  600 mg IntraVENous Q8H    heparin (porcine)  4,000 Units IntraVENous Once    pantoprazole (PROTONIX) 40 mg injection  40 mg IntraVENous Q12H     Continuous Infusions:    sodium chloride 50 mL/hr at 22 4848    dextrose      [Held by provider] heparin (PORCINE) Infusion Stopped (22 0604)     PRN Meds: potassium chloride **OR** potassium alternative oral replacement **OR** potassium chloride, sodium chloride flush, ondansetron **OR** ondansetron, polyethylene glycol, acetaminophen **OR** acetaminophen, potassium chloride, glucose, dextrose bolus **OR** dextrose bolus, glucagon (rDNA), dextrose, HYDROcodone-acetaminophen, [Held by provider] heparin (porcine), [Held by provider] heparin (porcine)    Data:     Past Medical History:   has a past medical history of 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 ([facility] [address] Acquired absence of right leg below knee ([address] Acute kidney failure ([address] Acute respiratory failure with hypoxia ([facility] [address] Cerebral infarction due to embolism ([address] Chronic kidney disease, unspecified, Chronic systolic (congestive) heart failure ([address] Depression, Heart failure ([address] Hypertension, Muscle weakness (generalized), Other symbolic dysfunctions, Peripheral vascular disease ([address] and Type 2 diabetes mellitus ([address] Social History:        Family History: History reviewed. No pertinent family history.     Vitals:  /83   Pulse 91   Temp 98.1 °F (36.7 °C) (Axillary)   Resp 19   Ht 6' 3\" (1.905 m)   Wt 151 lb 7.3 oz (68.7 kg)   SpO2 96%   BMI 18.93 kg/m²   Temp (24hrs), Av.1 °F (36.7 °C), Min:97.9 °F (36.6 °C), Max:98.3 °F (36.8 °C)    Recent Labs     11/22/22  0753 11/22/22  1622 11/23/22  0054 11/23/22  0606   POCGLU 122* 150* 183* 187*       I/O(24Hr): Intake/Output Summary (Last 24 hours) at 11/23/2022 0829  Last data filed at 11/23/2022 7801  Gross per 24 hour   Intake 2235.54 ml   Output 1235 ml   Net 1000.54 ml       Labs:  [unfilled]    Lab Results   Component Value Date/Time    SPECIAL RIGHT HAND 11/18/2022 11:56 AM     Lab Results   Component Value Date/Time    CULTURE NORMAL RESPIRATORY MOIRA MODERATE GROWTH 11/18/2022 06:23 PM       [unfilled]    Radiology:    CT CHEST WO CONTRAST    Result Date: 11/22/2022  EXAMINATION: CT OF THE CHEST WITHOUT CONTRAST 11/22/2022 10:04 am TECHNIQUE: CT of the chest was performed without the administration of intravenous contrast. Multiplanar reformatted images are provided for review. Automated exposure control, iterative reconstruction, and/or weight based adjustment of the mA/kV was utilized to reduce the radiation dose to as low as reasonably achievable. COMPARISON: 11/20/2022 chest radiograph, 10/19/2022 outside study HISTORY: ORDERING SYSTEM PROVIDED HISTORY: History of cavitary lesions/nodules-[facility] TECHNOLOGIST PROVIDED HISTORY: History of cavitary lesions/nodules-please compare with [facility]'s CT chest from September Reason for Exam: History of cavitary lesions/nodules-please compare with [facility]'s CT chest from September Additional signs and symptoms: Pt unable to answer questions or hold arms above head; pt vocalizing during exam FINDINGS: Chest Wall and Thoracic Inlet: No axillary or supraclavicular lymphadenopathy. Mediastinum and Jazmin: Scattered prominent mediastinal lymph nodes. A reference precarinal node measures 1.5 cm (series 2, image 49), not significantly changed. The ascending thoracic aorta is mildly dilated measuring 4.2 cm (series 2, image 66), unchanged.   Mild-to-moderate atherosclerosis of the thoracic aorta. Cardiomegaly. Chronic severe coronary artery calcifications. Calcifications are seen along the left ventricular myocardium, which can be seen in the setting of prior infarct. No pericardial effusion. Lungs/Pleura: Mild-to-moderate paraseptal emphysema. Mild centrilobular emphysema. There are right middle and lower lobe nodular and consolidative opacities which have increased from prior, concerning for an infectious or inflammatory etiology. Significantly improved left upper lobe ground-glass opacities. The previously visualized left upper lobe cavitary nodule is no longer present. Small left pleural effusion with partial left lower lobe collapse. Upper Abdomen: Left upper pole renal cyst. Bones: No suspicious osseous findings. Degenerative changes of the spine. There is a 1.8 x 1.3 cm exophytic soft tissue lesion arising from the right upper chest wall. Correlate with direct inspection. 1. Significantly improved but not entirely resolved left upper lobe nodules with surrounding ground-glass attenuation, some of which previously demonstrated cavitation. 2.  There are slightly increased right middle and lower lobe nodular consolidative opacities, concerning for persistent infectious/inflammatory process. Follow-up to resolution should be considered. 3.  Small left pleural effusion with partial collapse of the left lower lobe. 4.  Scattered prominent mediastinal lymph nodes are likely reactive. 5.  There is a 1.8 cm exophytic soft tissue lesion arising from the right upper chest wall. Correlate with direct inspection. 6.  Mild dilation of the ascending thoracic aorta measuring up to 4.2 cm. US RENAL LIMITED    Result Date: 11/19/2022  EXAMINATION: ULTRASOUND OF THE KIDNEYS 11/19/2022 6:22 am COMPARISON: None.  HISTORY: ORDERING SYSTEM PROVIDED HISTORY: EDELMIRA TECHNOLOGIST PROVIDED HISTORY: EDELMIRA FINDINGS: The right kidney measures 10.9 cm in length and the left kidney measures 10.9 cm in length. Bilateral renal cysts measuring measuring up to 2.5 cm on the right and 3.1 cm on the left. Kidneys otherwise demonstrate normal cortical echogenicity. No hydronephrosis or intrarenal stones. No focal lesions. Benign bilateral renal cysts     XR CHEST PORTABLE    Result Date: 11/20/2022  EXAMINATION: ONE XRAY VIEW OF THE CHEST 11/20/2022 2:53 am COMPARISON: 11/19/2022 HISTORY: ORDERING SYSTEM PROVIDED HISTORY: ETT placement TECHNOLOGIST PROVIDED HISTORY: ETT placement Reason for Exam: ETT placement Additional signs and symptoms: ETT placement Relevant Medical/Surgical History: ETT placement FINDINGS: There is an ET tube with the tip 5.3 cm above the amparo. There is an NG tube overlying the left upper quadrant, however the most distal portion of the tube is not visualized. Cardiac size is enlarged. Stable bibasal infiltrates with underlying left basilar atelectasis. .  The pulmonary vascularity is stable. No pneumothorax. No significant pleural effusions identified . Kiara Myesha Stable chest.     XR CHEST PORTABLE    Result Date: 11/19/2022  EXAMINATION: ONE XRAY VIEW OF THE CHEST 11/19/2022 5:49 am COMPARISON: 11/18/2022 HISTORY: ORDERING SYSTEM PROVIDED HISTORY: ETT placement TECHNOLOGIST PROVIDED HISTORY: ETT placement Reason for Exam: ETT placement Additional signs and symptoms: ETT placement Relevant Medical/Surgical History: ETT placement FINDINGS: Endotracheal tube approximately 6 cm above the amparo. Enteric catheter courses into the abdomen. Heart size stable. Bibasilar pulmonary opacities. No pneumothorax. No new airspace disease.      Persistent bibasilar pulmonary opacities could represent atelectasis or pneumonia     XR CHEST PORTABLE    Result Date: 11/18/2022  EXAMINATION: ONE XRAY VIEW OF THE CHEST 11/18/2022 6:00 pm COMPARISON: Radiograph performed earlier the same day HISTORY: ORDERING SYSTEM PROVIDED HISTORY: intubated TECHNOLOGIST PROVIDED HISTORY: intubated Reason for Exam: Intubation FINDINGS: Endotracheal tube terminates 7 cm above the amparo. Enteric tube is below the diaphragm. Cardiomediastinal silhouette is unchanged in size. Aortic atherosclerosis. No large pleural effusion or pneumothorax. There is patchy opacity in the right lung base. There is linear scarring/atelectasis in the left lung base. 1. Lines and tubes in expected position. 2. Bibasilar pulmonary opacities are again noted. XR CHEST PORTABLE    Result Date: 11/18/2022  EXAMINATION: ONE XRAY VIEW OF THE CHEST 11/18/2022 11:58 am COMPARISON: 09/27/2022 HISTORY: ORDERING SYSTEM PROVIDED HISTORY: sob TECHNOLOGIST PROVIDED HISTORY: sob Reason for Exam: sob FINDINGS: Stable cardiomegaly. Bibasilar pulmonary opacities noted. No pulmonary vascular congestion or edema. No pneumothorax. Bibasilar pulmonary opacities suggesting pneumonia         Physical Examination:        Physical Exam  Constitutional:       General: He is not in acute distress. Appearance: Normal appearance. HENT:      Head: Normocephalic and atraumatic. Nose: No congestion or rhinorrhea. Eyes:      Extraocular Movements: Extraocular movements intact. Conjunctiva/sclera: Conjunctivae normal.      Pupils: Pupils are equal, round, and reactive to light. Cardiovascular:      Rate and Rhythm: Normal rate. Pulses: Normal pulses. Heart sounds: Normal heart sounds. No murmur heard. No friction rub. No gallop. Pulmonary:      Effort: Pulmonary effort is normal. No respiratory distress. Breath sounds: Normal breath sounds. No wheezing or rales. Abdominal:      General: Abdomen is flat. Bowel sounds are normal. There is no distension. Tenderness: There is no abdominal tenderness. There is no guarding. Musculoskeletal:      Right lower leg: No edema. Left lower leg: No edema. Skin:     Capillary Refill: Capillary refill takes less than 2 seconds. Coloration: Skin is not pale. Findings: No bruising. Neurological:      General: No focal deficit present. Mental Status: He is alert. Sensory: No sensory deficit. Motor: No weakness. Psychiatric:         Mood and Affect: Mood normal.         Assessment:        Primary Problem  Acute respiratory failure with hypoxia and hypercapnia ([facility] [address]    Active Hospital Problems    Diagnosis Date Noted    Coffee ground emesis [K92.0] 11/20/2022     Priority: Medium    Rectal bleeding [K62.5] 11/20/2022     Priority: Medium    Acute respiratory failure with hypoxia and hypercapnia (HCC) [J96.01, J96.02] 11/18/2022     Priority: Medium    Aspiration pneumonia, unspecified aspiration pneumonia type, unspecified laterality, unspecified part of lung ([facility] [address] [J69.0] 11/18/2022     Priority: Medium       Plan:        Acute hypoxic hypercapnic respiratory failure 2/2 to bilateral aspiration pneumonia   -Patient was hypoxic on presentation (02 sat in 70s); currently intubated on ventilator; currently saturating 100%; Fi02 40.   -CXR 11/18: bibasilar pulmonary opacities suggesting pneumonia  -CXR 11/19: persistent bibasilar pulmonary opacities could represent atelectasis vs. Pneumonia  -CXR 11/20: stable bibasilar infiltrates with underlying left basilar atelectasis  -CT scan of chest once medically stable  -WBCS elevated at 16.7; today 8.8  -ABG on admission: pH 7.405, pC02 61.4, p02 81.2, HC03 38.4  -ABGs most recent : pH 7.412, pC02 49.7, p02 110., HC03 31.7  -Cefepime and Clindamycin given in ED; continue IV Cefepime and Clindamycin   -Off Propfol, Levophed, vasopressin  -Patient is off heparin drip due to clots found in stool and hgb drop to 7.0  -Blood cultures: no growth at 4 days  -Respiratory culture : normal amanda   -Pro-calcitonin 0.27  -Aspiration precautions  -Pulmonology/critical care on board      Acute on Chronic kidney disease  -Creatinine on admission 2.54 (baseline 1.31);  Cr today 2.14 (trending down)   -Urine Creatinine 40.6, random urine sodium 29  -Renal ultrasound: benign bilateral renal cysts. -Nephrology consulted (EDELMIRA most consistent with ischemic ATN 2/2 to hypotension, pre-renal azotemia; no indications for hemodialysis at this time; continue IVF at 50 mls/hr, hold diuretics, Entresto, spironolactone; continue free water flushes for hypernatremia.        DM2  -Glucose on admission: 186; today 187  -start high dose sliding scale; continue 10 U lantus  -POCT x4  -initiate hypoglycemic protocol  -Continue Gabapentin 200 mg nightly      Hyponatremia-resolved  -Na on admission 132; today 144  -Continue to monitor   -Nephrology consulted      History of embolic stroke  -Hold Warfarin 10 mg po daily (unclear if Warfarin is for severe peripheral artery disease or for possible history of atrial fibrillation)  -Patient is off heparin drip due to clots found in stool and hgb drop to 7.0  -monitor H + H   -Telemetry continuous monitoring      Anemia  -Hgb on admission 11.3; today 9.4  -H and H q 8 hours  -Hold heparin drip  -Continue Protonix 40 mg IV q 12   -GI consulted  (questionable stress ulcers, Continue IV PPI, transfuse for Hgb < 7, EGD planned for today but cancelled bc patient had tube feeds this am)     Elevated troponin   -Troponin on admission 160; 263 9/28  -EKG: sinus tachycardia with 1st degree AV block, anterior lateral infarct age undetermined   -Heparin on hold      CHF (EF (10-15%)  -EKG: sinus tachycardia with 1st degree AV block, left anterior fascicular block, and anterolateral infarct (age undetermined)  - Echo 9/28/22: EF < 20; RV function appears impaired; AV is sclerotic  -Hold Bumex  -Hold Coreg 6.25 mg BID  -Hold Lasix 40 mg daily  -Hold Entresto BID  -Hold Aldactone 25 mg daily       Depression/anxiety  -Continue Zoloft 50 mg daily  -Start Seroquel 50 mg po nightly      DVT prophylaxis: Hold home Warfarin 10 mg po daily; Hold Heparin   Code: Full code  Diet: NPO  PT/OT/SW consulted    Frederic Fink MD  11/23/2022  8:29 AM       Attestation and add on       I have discussed the care of Kera Collins , including pertinent history and exam findings,      11/23/22    with the resident. I have seen and examined the patient and the key elements of all parts of the encounter have been performed by me . I agree with the assessment, plan and orders as documented by the resident. Hospital Problems             Last Modified POA    * (Principal) Acute respiratory failure with hypoxia and hypercapnia (HCC) 11/18/2022 Yes    Aspiration pneumonia, unspecified aspiration pneumonia type, unspecified laterality, unspecified part of lung ([facility] [address] 11/18/2022 Yes    Coffee ground emesis 11/20/2022 Yes    Rectal bleeding 11/20/2022 Yes           ---critical time spent 33 minutes- ;        Medications: Allergies:     Allergies   Allergen Reactions    Penicillins Rash       Current Meds:   Scheduled Meds:    [Held by provider] polyethylene glycol  4,000 mL Oral Once    QUEtiapine  50 mg Oral Nightly    insulin lispro  0-4 Units SubCUTAneous Q6H    insulin glargine  10 Units SubCUTAneous BID    sodium chloride flush  5-40 mL IntraVENous 2 times per day    cefepime  1,000 mg IntraVENous Q12H    [Held by provider] bumetanide  1 mg Per G Tube Daily    [Held by provider] carvedilol  6.25 mg Per G Tube BID    [Held by provider] furosemide  40 mg Per G Tube Daily    [Held by provider] gabapentin  200 mg Per G Tube Nightly    [Held by provider] sacubitril-valsartan  1 tablet Per G Tube BID    [Held by provider] spironolactone  25 mg Per G Tube Daily    sertraline  50 mg Per G Tube Daily    clindamycin (CLEOCIN) IV  600 mg IntraVENous Q8H    heparin (porcine)  4,000 Units IntraVENous Once    pantoprazole (PROTONIX) 40 mg injection  40 mg IntraVENous Q12H     Continuous Infusions:    sodium chloride 50 mL/hr at 11/23/22 0623    dextrose      [Held by provider] heparin (PORCINE) Infusion Stopped (11/20/22 0604)     PRN Meds: potassium chloride **OR** potassium alternative oral replacement **OR** potassium chloride, bisacodyl, sodium chloride flush, ondansetron **OR** ondansetron, polyethylene glycol, acetaminophen **OR** acetaminophen, potassium chloride, glucose, dextrose bolus **OR** dextrose bolus, glucagon (rDNA), dextrose, HYDROcodone-acetaminophen, [Held by provider] heparin (porcine), [Held by provider] heparin (porcine)      MD LAN Jeong [address], [address] [place] [address].    Phone [phone]   Fax: [phone]  Answering Service: [phone]

## 2022-11-23 NOTE — OP NOTE
PROCEDURE NOTE    DATE OF PROCEDURE: 11/23/2022     SURGEON: Vida Sales MD  Facility: [facility]  ASSISTANT: None  Anesthesia: MAC  PREOPERATIVE DIAGNOSIS:   Anemia and coffee-ground emesis    Diagnosis:    POSTOPERATIVE DIAGNOSIS: As described below    OPERATION: Upper GI endoscopy with Biopsy    ANESTHESIA: Moderate Sedation     ESTIMATED BLOOD LOSS: Less than 50 ml    COMPLICATIONS: None. SPECIMENS:  Was Not Obtained    HISTORY: The patient is a 79y.o. year old male with history of above preop diagnosis. I recommended esophagogastroduodenoscopy with possible biopsy and I explained the risk, benefits, expected outcome, and alternatives to the procedure. Risks included but are not limited to bleeding, infection, respiratory distress, hypotension, and perforation of the esophagus, stomach, or duodenum. Patient understands and is in agreement. The patient was counseled at length about the risks of janusz Covid-19 during their perioperative period and any recovery window from their procedure. The patient was made aware that janusz Covid-19  may worsen their prognosis for recovering from their procedure  and lend to a higher morbidity and/or mortality risk. All material risks, benefits, and reasonable alternatives including postponing the procedure were discussed. The patient does wish to proceed with the procedure at this time. PROCEDURE: The patient was given IV conscious sedation. The patient's SPO2 remained above 90% throughout the procedure. The gastroscope was inserted orally and advanced under direct vision through the esophagus, through the stomach, through the pylorus, and into the descending duodenum. Post sedation note : The patient's SPO2 remained above 90% throughout the procedure. the vital signs remained stable , and no immediate complication form the procedure noted, patient will be ready for d/c when criteria is met .       Findings:    Retropharyngeal area was grossly normal appearing    Esophagus: normal    Stomach:  Significant edema and erythema consistent with gastritis  But there is no blood old or fresh  There is no coffee-ground liquid    Duodenum:   Significant inflammation with multiple inflammatory type polyps in the bulb  With a sharp duodenal sweep but no obstruction no ulcers no bleeding was seen        The scope was removed and the patient tolerated the procedure well.      Recommendations/Plan:   Repeat EGD as an outpatient  Colonoscopy outpatient  PPI for now  H&H monitoring    Electronically signed by Jose Barclay MD  on 11/23/2022 at 3:21 PM

## 2022-11-23 NOTE — PROGRESS NOTES
Sent secure message to NP notified of emesis after 200 water flush. Asked to clarify conflicting orders of 678 free water and 100cc free water flushes 6hrs.

## 2022-11-23 NOTE — PLAN OF CARE
Egd only today with dr Eloise Lora, surgery notified will hold colonoscopy prep. Primary rn instructed to have family come here or be available by phone for consent . Deya Garcia, BARBARA - CNP

## 2022-11-24 LAB
ABSOLUTE EOS #: 0.32 K/UL (ref 0–0.4)
ABSOLUTE LYMPH #: 0.74 K/UL (ref 1–4.8)
ABSOLUTE MONO #: 1.16 K/UL (ref 0.1–1.3)
ANION GAP SERPL CALCULATED.3IONS-SCNC: 12 MMOL/L (ref 9–17)
BASOPHILS # BLD: 0 % (ref 0–2)
BASOPHILS ABSOLUTE: 0 K/UL (ref 0–0.2)
BUN BLDV-MCNC: 79 MG/DL (ref 8–23)
CALCIUM SERPL-MCNC: 8.8 MG/DL (ref 8.6–10.4)
CHLORIDE BLD-SCNC: 108 MMOL/L (ref 98–107)
CO2: 25 MMOL/L (ref 20–31)
CREAT SERPL-MCNC: 1.75 MG/DL (ref 0.7–1.2)
EOSINOPHILS RELATIVE PERCENT: 3 % (ref 0–4)
GFR SERPL CREATININE-BSD FRML MDRD: 42 ML/MIN/1.73M2
GLUCOSE BLD-MCNC: 202 MG/DL (ref 75–110)
GLUCOSE BLD-MCNC: 205 MG/DL (ref 75–110)
GLUCOSE BLD-MCNC: 225 MG/DL (ref 75–110)
GLUCOSE BLD-MCNC: 241 MG/DL (ref 75–110)
GLUCOSE BLD-MCNC: 251 MG/DL (ref 70–99)
HCT VFR BLD CALC: 31.1 % (ref 41–53)
HEMOGLOBIN: 9.7 G/DL (ref 13.5–17.5)
LYMPHOCYTES # BLD: 7 % (ref 24–44)
MCH RBC QN AUTO: 29.4 PG (ref 26–34)
MCHC RBC AUTO-ENTMCNC: 31.4 G/DL (ref 31–37)
MCV RBC AUTO: 93.6 FL (ref 80–100)
MONOCYTES # BLD: 11 % (ref 1–7)
MORPHOLOGY: ABNORMAL
PDW BLD-RTO: 18.4 % (ref 11.5–14.9)
PLATELET # BLD: 146 K/UL (ref 150–450)
PMV BLD AUTO: 9.1 FL (ref 6–12)
POTASSIUM SERPL-SCNC: 4 MMOL/L (ref 3.7–5.3)
RBC # BLD: 3.32 M/UL (ref 4.5–5.9)
SEG NEUTROPHILS: 79 % (ref 36–66)
SEGMENTED NEUTROPHILS ABSOLUTE COUNT: 8.28 K/UL (ref 1.3–9.1)
SODIUM BLD-SCNC: 145 MMOL/L (ref 135–144)
WBC # BLD: 10.5 K/UL (ref 3.5–11)

## 2022-11-24 PROCEDURE — 36415 COLL VENOUS BLD VENIPUNCTURE: CPT

## 2022-11-24 PROCEDURE — 99232 SBSQ HOSP IP/OBS MODERATE 35: CPT | Performed by: INTERNAL MEDICINE

## 2022-11-24 PROCEDURE — 6370000000 HC RX 637 (ALT 250 FOR IP): Performed by: INTERNAL MEDICINE

## 2022-11-24 PROCEDURE — 82947 ASSAY GLUCOSE BLOOD QUANT: CPT

## 2022-11-24 PROCEDURE — 85025 COMPLETE CBC W/AUTO DIFF WBC: CPT

## 2022-11-24 PROCEDURE — 6360000002 HC RX W HCPCS: Performed by: INTERNAL MEDICINE

## 2022-11-24 PROCEDURE — 2580000003 HC RX 258: Performed by: INTERNAL MEDICINE

## 2022-11-24 PROCEDURE — A4216 STERILE WATER/SALINE, 10 ML: HCPCS | Performed by: INTERNAL MEDICINE

## 2022-11-24 PROCEDURE — 2500000003 HC RX 250 WO HCPCS: Performed by: INTERNAL MEDICINE

## 2022-11-24 PROCEDURE — 2060000000 HC ICU INTERMEDIATE R&B

## 2022-11-24 PROCEDURE — APPSS30 APP SPLIT SHARED TIME 16-30 MINUTES: Performed by: NURSE PRACTITIONER

## 2022-11-24 PROCEDURE — 80048 BASIC METABOLIC PNL TOTAL CA: CPT

## 2022-11-24 PROCEDURE — C9113 INJ PANTOPRAZOLE SODIUM, VIA: HCPCS | Performed by: INTERNAL MEDICINE

## 2022-11-24 PROCEDURE — 6370000000 HC RX 637 (ALT 250 FOR IP): Performed by: STUDENT IN AN ORGANIZED HEALTH CARE EDUCATION/TRAINING PROGRAM

## 2022-11-24 RX ORDER — INSULIN GLARGINE 100 [IU]/ML
10 INJECTION, SOLUTION SUBCUTANEOUS 2 TIMES DAILY
Status: DISCONTINUED | OUTPATIENT
Start: 2022-11-24 | End: 2022-11-25

## 2022-11-24 RX ORDER — INSULIN LISPRO 100 [IU]/ML
0-4 INJECTION, SOLUTION INTRAVENOUS; SUBCUTANEOUS NIGHTLY
Status: DISCONTINUED | OUTPATIENT
Start: 2022-11-24 | End: 2022-11-25

## 2022-11-24 RX ORDER — INSULIN LISPRO 100 [IU]/ML
0-4 INJECTION, SOLUTION INTRAVENOUS; SUBCUTANEOUS
Status: DISCONTINUED | OUTPATIENT
Start: 2022-11-24 | End: 2022-11-25

## 2022-11-24 RX ORDER — INSULIN GLARGINE 100 [IU]/ML
12 INJECTION, SOLUTION SUBCUTANEOUS 2 TIMES DAILY
Status: DISCONTINUED | OUTPATIENT
Start: 2022-11-24 | End: 2022-11-24

## 2022-11-24 RX ADMIN — INSULIN GLARGINE 10 UNITS: 100 INJECTION, SOLUTION SUBCUTANEOUS at 08:42

## 2022-11-24 RX ADMIN — INSULIN LISPRO 1 UNITS: 100 INJECTION, SOLUTION INTRAVENOUS; SUBCUTANEOUS at 17:45

## 2022-11-24 RX ADMIN — INSULIN LISPRO 1 UNITS: 100 INJECTION, SOLUTION INTRAVENOUS; SUBCUTANEOUS at 11:50

## 2022-11-24 RX ADMIN — SODIUM CHLORIDE, PRESERVATIVE FREE 40 MG: 5 INJECTION INTRAVENOUS at 20:46

## 2022-11-24 RX ADMIN — ONDANSETRON 4 MG: 2 INJECTION INTRAMUSCULAR; INTRAVENOUS at 08:46

## 2022-11-24 RX ADMIN — CLINDAMYCIN IN 5 PERCENT DEXTROSE 600 MG: 12 INJECTION, SOLUTION INTRAVENOUS at 04:35

## 2022-11-24 RX ADMIN — SERTRALINE HYDROCHLORIDE 50 MG: 50 TABLET ORAL at 08:26

## 2022-11-24 RX ADMIN — CEFEPIME 1000 MG: 1 INJECTION, POWDER, FOR SOLUTION INTRAMUSCULAR; INTRAVENOUS at 22:36

## 2022-11-24 RX ADMIN — INSULIN LISPRO 1 UNITS: 100 INJECTION, SOLUTION INTRAVENOUS; SUBCUTANEOUS at 08:42

## 2022-11-24 RX ADMIN — SODIUM CHLORIDE, PRESERVATIVE FREE 40 MG: 5 INJECTION INTRAVENOUS at 08:11

## 2022-11-24 RX ADMIN — HYDROCODONE BITARTRATE AND ACETAMINOPHEN 1 TABLET: 5; 325 TABLET ORAL at 20:51

## 2022-11-24 RX ADMIN — SODIUM CHLORIDE: 4.5 INJECTION, SOLUTION INTRAVENOUS at 21:07

## 2022-11-24 RX ADMIN — CLINDAMYCIN IN 5 PERCENT DEXTROSE 600 MG: 12 INJECTION, SOLUTION INTRAVENOUS at 21:02

## 2022-11-24 RX ADMIN — CEFEPIME 1000 MG: 1 INJECTION, POWDER, FOR SOLUTION INTRAMUSCULAR; INTRAVENOUS at 10:46

## 2022-11-24 RX ADMIN — QUETIAPINE FUMARATE 50 MG: 50 TABLET ORAL at 20:46

## 2022-11-24 RX ADMIN — CLINDAMYCIN IN 5 PERCENT DEXTROSE 600 MG: 12 INJECTION, SOLUTION INTRAVENOUS at 14:27

## 2022-11-24 RX ADMIN — INSULIN GLARGINE 10 UNITS: 100 INJECTION, SOLUTION SUBCUTANEOUS at 21:43

## 2022-11-24 ASSESSMENT — ENCOUNTER SYMPTOMS
VOMITING: 0
ABDOMINAL DISTENTION: 0
NAUSEA: 0
SHORTNESS OF BREATH: 0
CONSTIPATION: 0
ABDOMINAL PAIN: 0
WHEEZING: 0
COUGH: 0
DIARRHEA: 0
RHINORRHEA: 0
STRIDOR: 0

## 2022-11-24 ASSESSMENT — PAIN SCALES - GENERAL: PAINLEVEL_OUTOF10: 6

## 2022-11-24 ASSESSMENT — PAIN DESCRIPTION - LOCATION: LOCATION: BACK

## 2022-11-24 ASSESSMENT — PAIN DESCRIPTION - DESCRIPTORS: DESCRIPTORS: DULL

## 2022-11-24 ASSESSMENT — PAIN DESCRIPTION - ORIENTATION: ORIENTATION: LOWER

## 2022-11-24 NOTE — PROGRESS NOTES
[address]    PROGRESS NOTE             11/24/2022    8:24 AM    Name:   Lance Tomlinson  MRN:     [id number]     Acct:      [de-identified]   Room:   2098/2098-01   Day:  6  Admit Date:  11/18/2022 11:46 AM    PCP:  PROVIDER Jamie Shaw MD  Code Status:  Full Code    Subjective:     C/C:   Chief Complaint   Patient presents with    Shortness of Breath     Interval History Status: improved. The patient was seen and examined at the bedside. No acute overnight events as per patient and RN. The patient endorses hunger. He is oriented x 2. Vitals signs stable. Currently saturating 98% on  3 L. /90. Na+ 145 (hypernatremia resolved). EDELMIRA improving; Cr 1.75 today. Nephrology consulted: no indications for HD at this time. Continue IVF, Hold diuretics and Entresto. Upper GI endoscopy yesterday. Brief History:     The patient is a 79 y.o. 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 The nursing staff had concerns for aspiration. His oxygen saturation dropped to the 70s. The patient was transferred to [facility] for evaluation. In the ED, the patient was tachycardic (106), tachypneic (RR 25), and hypoxic (02 saturation in 70s). The patient was started on a 100% non re-breather. The patient was then weaned down to a 10 L salter (02 saturation 98%). The salter was then decreased to 8L. Chest x-ray showed bibasilar pulmonary opacities suggesting pneumonia. The patient was started on Cefepime and Clindamycin.  EKG showed sinus tachycardia with 1st degree AV block, left anterior fascicular block, and anterolateral infarct (age undetermined). WBCS were elevated at 16.7. Hgb was 11.3. ABGs showed pH 7.405, pC02 61.4, p02 81.2, Hc03 38.4. Blood cultures were drawn and results are pending. The patient was given normal saline bolus. The patient was admitted for acute hypoxic, hypercapnic respiratory failure secondary to suspected bilateral aspiration pneumonia    Review of Systems:     Review of Systems   Constitutional:  Negative for activity change, chills, fatigue and fever. HENT:  Negative for congestion and rhinorrhea. Respiratory:  Negative for cough, shortness of breath, wheezing and stridor. Cardiovascular:  Negative for chest pain, palpitations and leg swelling. Gastrointestinal:  Negative for abdominal distention, abdominal pain, constipation, diarrhea, nausea and vomiting. Endocrine: Negative for cold intolerance and heat intolerance. Genitourinary:  Negative for dysuria, frequency and urgency. Musculoskeletal:  Negative for arthralgias and myalgias. Skin:  Negative for pallor and rash. Neurological:  Negative for dizziness, tremors, weakness, light-headedness and headaches. Psychiatric/Behavioral:  Negative for agitation and confusion. Medications: Allergies:     Allergies   Allergen Reactions    Penicillins Rash       Current Meds:   Scheduled Meds:    insulin lispro  0-4 Units SubCUTAneous TID WC    insulin lispro  0-4 Units SubCUTAneous Nightly    insulin glargine  10 Units SubCUTAneous BID    [Held by provider] polyethylene glycol  4,000 mL Oral Once    QUEtiapine  50 mg Oral Nightly    sodium chloride flush  5-40 mL IntraVENous 2 times per day    cefepime  1,000 mg IntraVENous Q12H    [Held by provider] bumetanide  1 mg Per G Tube Daily    [Held by provider] carvedilol  6.25 mg Per G Tube BID    [Held by provider] furosemide  40 mg Per G Tube Daily    [Held by provider] gabapentin  200 mg Per G Tube Nightly    [Held by provider] sacubitril-valsartan  1 tablet Per G Tube BID    [Held by provider] hour   Intake 125 ml   Output 525 ml   Net -400 ml       Labs:  [unfilled]    Lab Results   Component Value Date/Time    SPECIAL RIGHT HAND 11/18/2022 11:56 AM     Lab Results   Component Value Date/Time    CULTURE NORMAL RESPIRATORY MOIRA MODERATE GROWTH 11/18/2022 06:23 PM       [unfilled]    Radiology:    CT CHEST WO CONTRAST    Result Date: 11/22/2022  EXAMINATION: CT OF THE CHEST WITHOUT CONTRAST 11/22/2022 10:04 am TECHNIQUE: CT of the chest was performed without the administration of intravenous contrast. Multiplanar reformatted images are provided for review. Automated exposure control, iterative reconstruction, and/or weight based adjustment of the mA/kV was utilized to reduce the radiation dose to as low as reasonably achievable. COMPARISON: 11/20/2022 chest radiograph, 10/19/2022 outside study HISTORY: ORDERING SYSTEM PROVIDED HISTORY: History of cavitary lesions/nodules-please compare with [facility]'s CT chest from September TECHNOLOGIST PROVIDED HISTORY: History of cavitary lesions/nodules-please compare with [facility]'s CT chest from September Reason for Exam: History of cavitary lesions/nodules-please compare with [email or web address] CT chest from September Additional signs and symptoms: Pt unable to answer questions or hold arms above head; pt vocalizing during exam FINDINGS: Chest Wall and Thoracic Inlet: No axillary or supraclavicular lymphadenopathy. Mediastinum and Jazmin: Scattered prominent mediastinal lymph nodes. A reference precarinal node measures 1.5 cm (series 2, image 49), not significantly changed. The ascending thoracic aorta is mildly dilated measuring 4.2 cm (series 2, image 66), unchanged. Mild-to-moderate atherosclerosis of the thoracic aorta. Cardiomegaly. Chronic severe coronary artery calcifications. Calcifications are seen along the left ventricular myocardium, which can be seen in the setting of prior infarct. No pericardial effusion.  Lungs/Pleura: Mild-to-moderate paraseptal emphysema. Mild centrilobular emphysema. There are right middle and lower lobe nodular and consolidative opacities which have increased from prior, concerning for an infectious or inflammatory etiology. Significantly improved left upper lobe ground-glass opacities. The previously visualized left upper lobe cavitary nodule is no longer present. Small left pleural effusion with partial left lower lobe collapse. Upper Abdomen: Left upper pole renal cyst. Bones: No suspicious osseous findings. Degenerative changes of the spine. There is a 1.8 x 1.3 cm exophytic soft tissue lesion arising from the right upper chest wall. Correlate with direct inspection. 1. Significantly improved but not entirely resolved left upper lobe nodules with surrounding ground-glass attenuation, some of which previously demonstrated cavitation. 2.  There are slightly increased right middle and lower lobe nodular consolidative opacities, concerning for persistent infectious/inflammatory process. Follow-up to resolution should be considered. 3.  Small left pleural effusion with partial collapse of the left lower lobe. 4.  Scattered prominent mediastinal lymph nodes are likely reactive. 5.  There is a 1.8 cm exophytic soft tissue lesion arising from the right upper chest wall. Correlate with direct inspection. 6.  Mild dilation of the ascending thoracic aorta measuring up to 4.2 cm. US RENAL LIMITED    Result Date: 11/19/2022  EXAMINATION: ULTRASOUND OF THE KIDNEYS 11/19/2022 6:22 am COMPARISON: None. HISTORY: ORDERING SYSTEM PROVIDED HISTORY: EDELMIRA TECHNOLOGIST PROVIDED HISTORY: EDELMIRA FINDINGS: The right kidney measures 10.9 cm in length and the left kidney measures 10.9 cm in length. Bilateral renal cysts measuring measuring up to 2.5 cm on the right and 3.1 cm on the left. Kidneys otherwise demonstrate normal cortical echogenicity. No hydronephrosis or intrarenal stones. No focal lesions.      Benign bilateral renal cysts     XR CHEST PORTABLE    Result Date: 11/20/2022  EXAMINATION: ONE XRAY VIEW OF THE CHEST 11/20/2022 2:53 am COMPARISON: 11/19/2022 HISTORY: ORDERING SYSTEM PROVIDED HISTORY: ETT placement TECHNOLOGIST PROVIDED HISTORY: ETT placement Reason for Exam: ETT placement Additional signs and symptoms: ETT placement Relevant Medical/Surgical History: ETT placement FINDINGS: There is an ET tube with the tip 5.3 cm above the amparo. There is an NG tube overlying the left upper quadrant, however the most distal portion of the tube is not visualized. Cardiac size is enlarged. Stable bibasal infiltrates with underlying left basilar atelectasis. .  The pulmonary vascularity is stable. No pneumothorax. No significant pleural effusions identified . Samira Danes Stable chest.     XR CHEST PORTABLE    Result Date: 11/19/2022  EXAMINATION: ONE XRAY VIEW OF THE CHEST 11/19/2022 5:49 am COMPARISON: 11/18/2022 HISTORY: ORDERING SYSTEM PROVIDED HISTORY: ETT placement TECHNOLOGIST PROVIDED HISTORY: ETT placement Reason for Exam: ETT placement Additional signs and symptoms: ETT placement Relevant Medical/Surgical History: ETT placement FINDINGS: Endotracheal tube approximately 6 cm above the amparo. Enteric catheter courses into the abdomen. Heart size stable. Bibasilar pulmonary opacities. No pneumothorax. No new airspace disease. Persistent bibasilar pulmonary opacities could represent atelectasis or pneumonia     XR CHEST PORTABLE    Result Date: 11/18/2022  EXAMINATION: ONE XRAY VIEW OF THE CHEST 11/18/2022 6:00 pm COMPARISON: Radiograph performed earlier the same day HISTORY: ORDERING SYSTEM PROVIDED HISTORY: intubated TECHNOLOGIST PROVIDED HISTORY: intubated Reason for Exam: Intubation FINDINGS: Endotracheal tube terminates 7 cm above the amparo. Enteric tube is below the diaphragm. Cardiomediastinal silhouette is unchanged in size. Aortic atherosclerosis. No large pleural effusion or pneumothorax.   There is patchy opacity in the right lung base. There is linear scarring/atelectasis in the left lung base. 1. Lines and tubes in expected position. 2. Bibasilar pulmonary opacities are again noted. XR CHEST PORTABLE    Result Date: 11/18/2022  EXAMINATION: ONE XRAY VIEW OF THE CHEST 11/18/2022 11:58 am COMPARISON: 09/27/2022 HISTORY: ORDERING SYSTEM PROVIDED HISTORY: sob TECHNOLOGIST PROVIDED HISTORY: sob Reason for Exam: sob FINDINGS: Stable cardiomegaly. Bibasilar pulmonary opacities noted. No pulmonary vascular congestion or edema. No pneumothorax. Bibasilar pulmonary opacities suggesting pneumonia         Physical Examination:        Physical Exam  Constitutional:       General: He is not in acute distress. Appearance: Normal appearance. HENT:      Head: Normocephalic and atraumatic. Nose: No congestion or rhinorrhea. Eyes:      Extraocular Movements: Extraocular movements intact. Conjunctiva/sclera: Conjunctivae normal.      Pupils: Pupils are equal, round, and reactive to light. Cardiovascular:      Rate and Rhythm: Normal rate and regular rhythm. Pulses: Normal pulses. Heart sounds: Normal heart sounds. No murmur heard. No friction rub. No gallop. Pulmonary:      Effort: Pulmonary effort is normal. No respiratory distress. Breath sounds: Normal breath sounds. No wheezing, rhonchi or rales. Abdominal:      General: Abdomen is flat. Bowel sounds are normal. There is no distension. Tenderness: There is no abdominal tenderness. There is no guarding. Musculoskeletal:      Comments: B/l below knee Amputation    Skin:     Capillary Refill: Capillary refill takes less than 2 seconds. Coloration: Skin is not pale. Findings: No bruising. Neurological:      General: No focal deficit present. Mental Status: He is alert. Sensory: No sensory deficit. Motor: No weakness.    Psychiatric:         Mood and Affect: Mood normal.         Assessment:        Primary Chronic kidney disease  -Creatinine on admission 2.54 (baseline 1.31); Cr today 1.75 (trending down)   -Urine Creatinine 40.6, random urine sodium 29  -Renal ultrasound: benign bilateral renal cysts. -Nephrology consulted (EDELMIRA most consistent with ischemic ATN 2/2 to hypotension, pre-renal azotemia; no indications for hemodialysis at this time; continue IVF at 50 mls/hr, hold diuretics, Entresto, spironolactone; continue free water flushes for hypernatremia; continue K+ replacement). DM2  -Glucose on admission: 186; today 241  -start high dose sliding scale; continue 10 U lantus  -POCT x4  -initiate hypoglycemic protocol  -Continue Gabapentin 200 mg nightly      Hyponatremia-resolved  -Na on admission 132; today 145  -Continue to monitor   -Nephrology consulted      History of embolic stroke  -Hold Warfarin 10 mg po daily (unclear if Warfarin is for severe peripheral artery disease or for possible history of atrial fibrillation)  -Patient is off heparin drip due to clots found in stool and hgb drop to 7.0  -monitor H + H   -Telemetry continuous monitoring      Anemia  -Hgb on admission 11.3; today 9.7  -Hold heparin drip  -Continue Protonix 40 mg IV q 12   -EGD yesterday: stomach showed gastritis; no active bleed.  Duodenum showed inflammatory polyps; no obstruction or bleeds  -GI consulted  (Continue IV PPI, repeat endoscopy OP, colonoscopy for OP, continue H and H monitoring)     Elevated troponin   -Troponin on admission 160; 263 9/28  -EKG: sinus tachycardia with 1st degree AV block, anterior lateral infarct age undetermined   -Heparin on hold      CHF (EF (10-15%)  -EKG: sinus tachycardia with 1st degree AV block, left anterior fascicular block, and anterolateral infarct (age undetermined)  - Echo 9/28/22: EF < 20; RV function appears impaired; AV is sclerotic  -Hold Bumex  -Hold Coreg 6.25 mg BID  -Hold Lasix 40 mg daily  -Hold Entresto BID  -Hold Aldactone 25 mg daily Depression/anxiety  -Continue Zoloft 50 mg daily  -Continue Seroquel 50 mg po nightly      DVT prophylaxis: Hold home Warfarin 10 mg po daily; Hold Heparin   Code: Full code  Diet: NPO  PT/OT/SW consulted      Kirt Balderas MD  11/24/2022  8:24 AM      Attending Physician Statement  I have discussed the care of Michelle De Leon and I have examined the patient myselft and taken ros and hpi , including pertinent history and exam findings,  with the resident. I have reviewed the key elements of all parts of the encounter with the resident. I agree with the assessment, plan and orders as documented by the resident. Spent 35 minutes in reviewing data/medicines/talking to patient/family,  explaining and answering all the questions.         Electronically signed by Jerilyn Phelan MD

## 2022-11-24 NOTE — PLAN OF CARE
Problem: Discharge Planning  Goal: Discharge to home or other facility with appropriate resources  11/24/2022 1721 by Karen Herman RN  Outcome: Progressing     Problem: Chronic Conditions and Co-morbidities  Goal: Patient's chronic conditions and co-morbidity symptoms are monitored and maintained or improved  11/24/2022 1721 by Karen Herman RN  Outcome: Progressing     Problem: Safety - Medical Restraint  Goal: Remains free of injury from restraints (Restraint for Interference with Medical Device)  Description: INTERVENTIONS:  1. Determine that other, less restrictive measures have been tried or would not be effective before applying the restraint  2. Evaluate the patient's condition at the time of restraint application  3. Inform patient/family regarding the reason for restraint  4.  Q2H: Monitor safety, psychosocial status, comfort, nutrition and hydration  11/24/2022 1721 by Karen Herman RN  Outcome: Progressing     Problem: Pain  Goal: Verbalizes/displays adequate comfort level or baseline comfort level  11/24/2022 1721 by Karen Herman RN  Outcome: Progressing

## 2022-11-24 NOTE — PROGRESS NOTES
[facility]    Gastroenterology Daily Progress Note      Patient:   Kera Collins   :    1955   Facility:   [facility]  Date:     2022  Consultant:   BARBARA Carey CNP, CNP      SUBJECTIVE  79 y.o. male admitted 2022 with Acute respiratory failure with hypoxia and hypercapnia (HCC) [J96.01, J96.02]  Pneumonia of both lower lobes due to infectious organism [J18.9]  Aspiration pneumonia, unspecified aspiration pneumonia type, unspecified laterality, unspecified part of lung ([address] [J69.0] and seen for anemia with coffee ground emesis, hematochezia. The pt was seen and examined. He is s/p egd yesterday that is discussed below. No abdominal pain or emesis. Tolerating tube feeding. No bm overnight. Hgb 9.7.         OBJECTIVE  Scheduled Meds:   insulin lispro  0-4 Units SubCUTAneous TID WC    insulin lispro  0-4 Units SubCUTAneous Nightly    insulin glargine  10 Units SubCUTAneous BID    [Held by provider] polyethylene glycol  4,000 mL Oral Once    QUEtiapine  50 mg Oral Nightly    sodium chloride flush  5-40 mL IntraVENous 2 times per day    cefepime  1,000 mg IntraVENous Q12H    [Held by provider] bumetanide  1 mg Per G Tube Daily    [Held by provider] carvedilol  6.25 mg Per G Tube BID    [Held by provider] furosemide  40 mg Per G Tube Daily    [Held by provider] gabapentin  200 mg Per G Tube Nightly    [Held by provider] sacubitril-valsartan  1 tablet Per G Tube BID    [Held by provider] spironolactone  25 mg Per G Tube Daily    sertraline  50 mg Per G Tube Daily    clindamycin (CLEOCIN) IV  600 mg IntraVENous Q8H    heparin (porcine)  4,000 Units IntraVENous Once    pantoprazole (PROTONIX) 40 mg injection  40 mg IntraVENous Q12H       Vital Signs:  BP (!) 129/90   Pulse 95   Temp 98.1 °F (36.7 °C) (Oral)   Resp 18   Ht 6' 3\" (1.905 m)   Wt 161 lb 2.5 oz (73.1 kg)   SpO2 98%   BMI 20.14 kg/m²    Review of Systems - History obtained from chart review and the patient  General ROS: positive for  - fatigue  Respiratory ROS: no cough, shortness of breath, or wheezing  Cardiovascular ROS: no chest pain or dyspnea on exertion  Gastrointestinal ROS: positive for - blood in stools  negative for - abdominal pain, hematemesis, melena, or nausea/vomiting   Physical Exam:     General Appearance: alert and oriented to person, place and time, well-developed and well-nourished, in no acute distress  Skin: warm and dry, no rash or erythema  Head: normocephalic and atraumatic  Eyes: pupils equal, round, and reactive to light, extraocular eye movements intact, conjunctivae normal  ENT: hearing grossly normal bilaterally  Neck: neck supple and non tender without mass, no thyromegaly or thyroid nodules, no cervical lymphadenopathy   Pulmonary/Chest: clear to auscultation bilaterally- no wheezes, rales or rhonchi, normal air movement, no respiratory distress  Cardiovascular: normal rate, regular rhythm, normal S1 and S2, no murmurs, rubs, clicks or gallops, distal pulses intact, no carotid bruits  Abdomen: soft, non-tender, non-distended, normal bowel sounds, no masses or organomegaly peg tube with no bleeding or sign of infection  Extremities: no cyanosis, clubbing or edema  Musculoskeletal: normal range of motion, no joint swelling, deformity or tenderness  Neurologic: no cranial nerve deficit and muscle strength normal    Lab and Imaging Review     CBC  Recent Labs     11/22/22  0507 11/22/22  1614 11/23/22  0542 11/23/22  1713 11/24/22  0403   WBC 11.4*  --  8.7  --  10.5   HGB 8.8*   < > 9.4* 9.6* 9.7*   HCT 28.1*   < > 29.5* 31.8* 31.1*   MCV 94.2  --  93.7  --  93.6   *  --  155  --  146*    < > = values in this interval not displayed.        BMP  Recent Labs     11/22/22  0507 11/23/22  0542 11/24/22  0403   * 144 145*   K 3.9 3.4* 4.0    106 108*   CO2 22 24 25   BUN 99* 86* 79*   CREATININE 2.00* 2.14* 1.75*   GLUCOSE 145* 207* 251*   CALCIUM 9.1 8.7 8.8 Findings:egd dr Viridiana Morales 11/23/22     Retropharyngeal area was grossly normal appearing     Esophagus: normal     Stomach:  Significant edema and erythema consistent with gastritis  But there is no blood old or fresh  There is no coffee-ground liquid     Duodenum:   Significant inflammation with multiple inflammatory type polyps in the bulb  With a sharp duodenal sweep but no obstruction no ulcers no bleeding was seen    ASSESSMENT/plan  Anemia with hematochezia and coffee ground material in the OG improved s/p egd yesterday showing gastritis and duodenal inflammation  -outpt colonoscopy   Ppi  Trend hh    2 respiratory failure improved      3. EDELMIRA     Time spent reviewing chart assessing pt and d/w md around 30 minutes         Gi signing off f/u outpt    This plan was formulated in collaboration with Dr. Raydell Koyanagi  . Electronically signed by: BARBARA Green CNP on 11/24/2022 at 7:51 AM     Attending Physician Statement          I have discussed the care of Logan Childs and   I have examined the patient myselft independently, and taken ros and hpi , including pertinent history and exam findings,  with the author of this note . I have reviewed the key elements of all parts of the encounter with the nurse practitioner/resident. I agree with the assessment, plan and orders as documented by the above health care provider     More than 50% of the time on this visit was spent by me   hysical Exam  Blood pressure (!) 122/92, pulse 92, temperature 97.7 °F (36.5 °C), temperature source Axillary, resp. rate 18, height 6' 3\" (1.905 m), weight 159 lb 13.3 oz (72.5 kg), SpO2 96 %.          General Appearance: alert and oriented to person, place and time, well-developed and well-nourished, in no acute distress  Skin: warm and dry, no rash or erythema  Head: normocephalic and atraumatic  Eyes: pupils equal, round, and reactive to light, extraocular eye movements intact, conjunctivae normal  ENT: hearing grossly normal bilaterally  Neck: neck supple and non tender without mass, no thyromegaly or thyroid nodules, no cervical lymphadenopathy   Pulmonary/Chest: clear to auscultation bilaterally- no wheezes, rales or rhonchi, normal air movement, no respiratory distress  Cardiovascular: normal rate, regular rhythm, normal S1 and S2, no murmurs, rubs, clicks or gallops, distal pulses intact, no carotid bruits  Abdomen: soft, non-tender, non-distended, normal bowel sounds, no masses or organomegaly  Extremities: no cyanosis, clubbing or edema  Musculoskeletal: normal range of motion, no joint swelling, deformity or tenderness  Neurologic: no cranial nerve deficit and muscle strength normal    Data Review:    Recent Labs     11/23/22  0542 11/23/22  1713 11/24/22  0403 11/25/22  0534   WBC 8.7  --  10.5 10.4   HGB 9.4* 9.6* 9.7* 9.6*   HCT 29.5* 31.8* 31.1* 30.3*   MCV 93.7  --  93.6 95.1     --  146* 133*     Recent Labs     11/23/22  0542 11/24/22  0403 11/25/22  0534    145* 144   K 3.4* 4.0 4.0    108* 108*   CO2 24 25 26   BUN 86* 79* 66*   CREATININE 2.14* 1.75* 1.60*     No results for input(s): AST, ALT, ALB, BILIDIR, BILITOT, ALKPHOS in the last 72 hours. No results for input(s): LIPASE, AMYLASE in the last 72 hours. No results for input(s): PROTIME, INR in the last 72 hours. No results for input(s): PTT in the last 72 hours. No results for input(s): OCCULTBLD in the last 72 hours.   CEA:  No results found for: CEA  Ca 125:  No results found for:   Ca 19-9:  No results found for:   Ca 15-3:  No results found for:   AFP:  No components found for: AFAFP  Beta HCG:  No components found for: BHCG  Neuron Specific Enolase:  No results found for: NSE      Additional :      No sign of bleeding  Colonoscopy outpatient  Mental status is better    Electronically signed by Susana Doan MD

## 2022-11-24 NOTE — CARE COORDINATION
DISCHARGE PLANNING NOTE:    Plan remains for patient to be discharged back to [facility]. Does not need pre-cert to return. Active order for IV Cefepime and IV Clinda. Will continue to follow for additional discharge needs.     Electronically signed by Moira Roper RN on 11/24/2022 at 10:59 AM

## 2022-11-24 NOTE — PROGRESS NOTES
Dr. Nimco Spain MD / Dr. Bhavna Pierre MD / Dr. James JIMENEZ CNP  Ananth Durandtiff CNP                  Pulmonary Consult Note   [facility]      Patient - Awais Hawley,  Age - 79 y.o.    - 1955      Room Number - 3293/4198-69   MRN -  [id number]   Acct # - [de-identified]  Date of Admission -  2022 11:46 AM        Consulting Elio More MD  Primary Care Physician - Meghan Agosto MD     HPI   Vital signs are stable. On 3L NC. HGB remains stable. Patient does not normally wear home oxygen. Plans for possible skilled nursing facility tomorrow. He tolerated EGD. Consistent with gastritis plans for outpatient EGD repeated and colonoscopy    OBJECTIVE   VITALS    height is 6' 3\" (1.905 m) and weight is 161 lb 2.5 oz (73.1 kg). His temperature is 97.5 °F (36.4 °C). His blood pressure is 126/91 (abnormal) and his pulse is 88. His respiration is 18 and oxygen saturation is 100%. Body mass index is 20.14 kg/m².   Temperature Range: Temp: 97.5 °F (36.4 °C) Temp  Av.8 °F (36.6 °C)  Min: 97.1 °F (36.2 °C)  Max: 98.4 °F (36.9 °C)  BP Range:  Systolic (76TYH), CUO:765 , Min:114 , FSX:383     Diastolic (67WRW), HWT:79, Min:63, Max:94    Pulse Range: Pulse  Av.3  Min: 67  Max: 101  Respiration Range: Resp  Av.8  Min: 14  Max: 23  Current Pulse Ox[de-identified]  SpO2: 100 %  24HR Pulse Ox Range:  SpO2  Av.2 %  Min: 89 %  Max: 100 %  Oxygen Amount and Delivery: O2 Flow Rate (L/min): 3 L/min    Wt Readings from Last 3 Encounters:   22 161 lb 2.5 oz (73.1 kg)   22 156 lb 8.4 oz (71 kg)   22 150 lb (68 kg)       I/O (24 Hours)    Intake/Output Summary (Last 24 hours) at 2022 0631  Last data filed at 2022 1938  Gross per 24 hour   Intake 125 ml   Output 525 ml   Net -400 ml       EXAM     General Appearance  Awake, alert, oriented, in no acute distress  HEENT - normocephalic, atraumatic   Neck - Supple,  trachea midline   Lungs -nonlabored respirations. Breath sounds are slightly coarse.   Cardiovascular - Heart sounds are normal.  Regular rate and rhythm   Abdomen - Soft, nontender, nondistended, no masses or organomegaly  Neurologic - There are no focal motor or sensory deficits  Skin - No bruising or bleeding  Extremities -bilateral below the knee amputation    MEDS      [Held by provider] polyethylene glycol  4,000 mL Oral Once    QUEtiapine  50 mg Oral Nightly    insulin lispro  0-4 Units SubCUTAneous Q6H    insulin glargine  10 Units SubCUTAneous BID    sodium chloride flush  5-40 mL IntraVENous 2 times per day    cefepime  1,000 mg IntraVENous Q12H    [Held by provider] bumetanide  1 mg Per G Tube Daily    [Held by provider] carvedilol  6.25 mg Per G Tube BID    [Held by provider] furosemide  40 mg Per G Tube Daily    [Held by provider] gabapentin  200 mg Per G Tube Nightly    [Held by provider] sacubitril-valsartan  1 tablet Per G Tube BID    [Held by provider] spironolactone  25 mg Per G Tube Daily    sertraline  50 mg Per G Tube Daily    clindamycin (CLEOCIN) IV  600 mg IntraVENous Q8H    heparin (porcine)  4,000 Units IntraVENous Once    pantoprazole (PROTONIX) 40 mg injection  40 mg IntraVENous Q12H      sodium chloride 35 mL/hr at 11/23/22 1658    dextrose      [Held by provider] heparin (PORCINE) Infusion Stopped (11/20/22 0604)     potassium chloride **OR** potassium alternative oral replacement **OR** potassium chloride, bisacodyl, sodium chloride flush, ondansetron **OR** ondansetron, polyethylene glycol, acetaminophen **OR** acetaminophen, potassium chloride, glucose, dextrose bolus **OR** dextrose bolus, glucagon (rDNA), dextrose, HYDROcodone-acetaminophen, [Held by provider] heparin (porcine), [Held by provider] heparin (porcine)    LABS   CBC   Recent Labs     11/24/22  0403   WBC 10.5   HGB 9.7*   HCT 31.1*   MCV 93.6   *     BMP:   Lab Results   Component Value Date/Time     11/24/2022 04:03 AM    K 4.0 11/24/2022 04:03 AM     11/24/2022 04:03 AM    CO2 25 11/24/2022 04:03 AM    BUN 79 11/24/2022 04:03 AM    LABALBU 3.6 11/18/2022 12:25 PM    CREATININE 1.75 11/24/2022 04:03 AM    CALCIUM 8.8 11/24/2022 04:03 AM    GFRAA 33 09/28/2022 04:04 AM    LABGLOM 42 11/24/2022 04:03 AM     ABGs:  Lab Results   Component Value Date/Time    PHART 7.412 11/20/2022 04:22 AM    PO2ART 110.0 11/20/2022 04:22 AM    JZQ8HIM 49.7 11/20/2022 04:22 AM      Lab Results   Component Value Date/Time    MODE PRVC 11/20/2022 04:22 AM     Ionized Calcium:  No results found for: IONCA  Magnesium:    Lab Results   Component Value Date/Time    MG 3.1 11/23/2022 05:42 AM     Phosphorus:    Lab Results   Component Value Date/Time    PHOS 4.7 11/07/2022 03:50 AM        LIVER PROFILE No results for input(s): AST, ALT, LIPASE, BILIDIR, BILITOT, ALKPHOS in the last 72 hours. Invalid input(s): AMYLASE,  ALB  INR No results for input(s): INR in the last 72 hours.   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  Cardiomyopathy EF 10 to 15%  Type 2 diabetes  History of embolic stroke  Aphasia  Acute on chronic kidney disease, did have dialysis while at [facility]:  Continue with current antibiotics-recommend transitioning to oral clindamycin as well as oral Ceftin at time of discharge-commend total days of treatment of 10 days  No plans for bronchoscopy at this time  Would recommend repeat CT scan in 4 to 6 weeks to assure resolution  Plans for skilled nursing facility-no objection when okay with other services  I discussed with nursing staff        Electronically signed by BARBARA Carpenter CNP on 11/24/2022 at 6:31 AM

## 2022-11-24 NOTE — PROGRESS NOTES
NEPHROLOGY PROGRESS NOTE    Patient :  Dimas Al; 79 y.o. MRN# [id number]  Location:  2098/2098-01  Attending:  Andrew Rendon MD  Admit Date:  11/18/2022   Hospital Day: 6    Reason for consultation: Management of acute kidney injury. Consulting physician: Andrew Rendon MD.    Interval history: Patient was seen and examined today  He is awake but aphasic. He does not appear to be in acute respiratory distress. Follows commands-  Receiving free water flushes via tube feeding reviewed-renal function improving BUN/creatinine 79  and 1.75  mg/dL. He is nonoliguric. History of Present Illness: This is a 79 y.o. 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 Patient had an episode of vomiting and developed shortness of breath and therefore aspiration pneumonia suspected patient was noted to be hypoxic with pulse ox in 70s on room air.   Patient initially was placed on nonrebreather and then patient was intubated last night currently on ventilator  Patient also noted to be hypotensive, on Levophed  Labs reviewed, serum creatinine on admission BUN was noted to be 139 and creatinine 2.5 mg/dL, patient's last serum creatinine on November 11, 2022 was 1.2 mg/dL  Patient is nonoliguric urine output 650 mL overnight    Current Medications: insulin lispro (HUMALOG) injection vial 0-4 Units, TID WC  insulin lispro (HUMALOG) injection vial 0-4 Units, Nightly  insulin glargine (LANTUS) injection vial 10 Units, BID  potassium chloride (KLOR-CON M) extended release tablet 40 mEq, PRN   Or  potassium bicarb-citric acid (EFFER-K) effervescent tablet 40 mEq, PRN   Or  potassium chloride 10 mEq/100 mL IVPB (Peripheral Line), PRN  bisacodyl (DULCOLAX) EC tablet 5 mg, Daily PRN  QUEtiapine (SEROQUEL) tablet 50 mg, Nightly  0.45 % sodium chloride infusion, Continuous  sodium chloride flush 0.9 % injection 5-40 mL, 2 times per day  sodium chloride flush 0.9 % injection 5-40 mL, PRN  ondansetron (ZOFRAN-ODT) disintegrating tablet 4 mg, Q8H PRN   Or  ondansetron (ZOFRAN) injection 4 mg, Q6H PRN  polyethylene glycol (GLYCOLAX) packet 17 g, Daily PRN  acetaminophen (TYLENOL) tablet 650 mg, Q6H PRN   Or  acetaminophen (TYLENOL) suppository 650 mg, Q6H PRN  potassium chloride 10 mEq/100 mL IVPB (Peripheral Line), PRN  cefepime (MAXIPIME) 1,000 mg in sodium chloride 0.9 % 50 mL IVPB (mini-bag), Q12H  glucose chewable tablet 16 g, PRN  dextrose bolus 10% 125 mL, PRN   Or  dextrose bolus 10% 250 mL, PRN  glucagon (rDNA) injection 1 mg, PRN  dextrose 10 % infusion, Continuous PRN  [Held by provider] bumetanide (BUMEX) tablet 1 mg, Daily  [Held by provider] carvedilol (COREG) tablet 6.25 mg, BID  [Held by provider] furosemide (LASIX) tablet 40 mg, Daily  [Held by provider] gabapentin (NEURONTIN) capsule 200 mg, Nightly  HYDROcodone-acetaminophen (NORCO) 5-325 MG per tablet 1 tablet, Q6H PRN  [Held by provider] sacubitril-valsartan (ENTRESTO) 24-26 MG per tablet 1 tablet, BID  [Held by provider] spironolactone (ALDACTONE) tablet 25 mg, Daily  sertraline (ZOLOFT) tablet 50 mg, Daily  clindamycin (CLEOCIN) 600 mg in dextrose 5 % 50 mL IVPB, Q8H  heparin (porcine) injection 4,000 Units, Once  [Held by provider] heparin (porcine) injection 4,000 Units, PRN  [Held by provider] heparin (porcine) injection 2,000 Units, PRN  [Held by provider] heparin 25,000 units in dextrose 5% 250 mL (premix) infusion, Continuous  pantoprazole (PROTONIX) 40 mg in sodium chloride (PF) 0.9 % 10 mL injection, Q12H    Objective:  CURRENT TEMPERATURE:  Temp: 98.3 °F (36.8 °C)  MAXIMUM TEMPERATURE OVER 24HRS:  Temp (24hrs), Av.9 °F (36.6 °C), Min:97.1 °F (36.2 °C), Max:98.4 °F (36.9 °C)    CURRENT RESPIRATORY RATE:  Resp: 18  CURRENT PULSE:  Heart Rate: 94  CURRENT BLOOD PRESSURE:  BP: 125/85  24HR BLOOD PRESSURE RANGE:  Systolic (30SFQ), :513 , Min:114 , FEC:326   ; Diastolic (95HWP), DTX:92, Min:63, Max:94    24HR INTAKE/OUTPUT:    Intake/Output Summary (Last 24 hours) at 2022 1233  Last data filed at 2022 0848  Gross per 24 hour   Intake 1866.83 ml   Output 325 ml   Net 1541.83 ml       Patient Vitals for the past 96 hrs (Last 3 readings):   Weight   22 0430 161 lb 2.5 oz (73.1 kg)   22 0210 151 lb 7.3 oz (68.7 kg)   22 0545 151 lb 10.8 oz (68.8 kg)       Physical Exam:  GENERAL APPEARANCE: Awake and alert but with expressive aphasia. HEAD: Normocephalic and atraumatic  EYES:  EOMI. Not pale, anicteric   CARDIAC: Normal S1 and S2. No S3, S4 or murmurs. Rhythm is regular. LUNGS:diminished breath sounds. Coarse breath sounds no accessory muscle use  ABDOMEN: With PEG tube in situ. Normal bowel sounds. EXTREMITIES: No edema. Bilateral BKA  NEURO: Nonfocal    Labs:   CBC:  Recent Labs     22  0507 22  1614 22  0542 22  1713 22  0403   WBC 11.4*  --  8.7  --  10.5   RBC 2.98*  --  3.15*  --  3.32*   HGB 8.8*   < > 9.4* 9.6* 9.7*   HCT 28.1*   < > 29.5* 31.8* 31.1*   MCV 94.2  --  93.7  --  93.6   MCH 29.7  --  29.9  --  29.4   MCHC 31.5  --  31.9  --  31.4   RDW 18.6*  --  18.0*  --  18.4*   *  --  155  --  146*   MPV 10.1  --  9.9  --  9.1    < > = values in this interval not displayed.         BMP:   Recent Labs     22  4569 11/23/22  0542 11/24/22  0403   * 144 145*   K 3.9 3.4* 4.0    106 108*   CO2 22 24 25   BUN 99* 86* 79*   CREATININE 2.00* 2.14* 1.75*   GLUCOSE 145* 207* 251*   CALCIUM 9.1 8.7 8.8        Phosphorus:  No results for input(s): PHOS in the last 72 hours. Magnesium:   Recent Labs     11/23/22  0542   MG 3.1*       Albumin:   No results for input(s): LABALBU in the last 72 hours. \  No results for input(s): PROT, ALBCAL, ALBCAL, ALBPCT, LABALPH, A1PCT, LABALPH, A2PCT, LABBETA, BETAPCT, GAMGLOB, GGPCT, PATH in the last 72 hours. No results for input(s): VÍCTOR in the last 72 hours. Urine Creatinine:    No results for input(s): LABCREA in the last 72 hours. Urinalysis:    No results for input(s): Lisa Jamaal, PHUR, LABCAST, WBCUA, RBCUA, MUCUS, TRICHOMONAS, YEAST, BACTERIA, CLARITYU, SPECGRAV, LEUKOCYTESUR, UROBILINOGEN, BILIRUBINUR, BLOODU, GLUCOSEU, KETUA, AMORPHOUS in the last 72 hours. Assessment/plan:    1. Acute kidney injury on chronic kidney disease stage 3-acute kidney injury in this patient is most consistent with ischemic acute tubular necrosis secondary to hypotension, prerenal azotemia from diuretics, Entresto and Aldactone. He is nonoliguric [Urine output of 525 mls  cc last 24 hours]. Plan: T  Taper IV fluids  Monitor urine output closely. Continue to hold diuretics, Entresto and spironolactone. Basic metabolic profile daily. Continue free water flushes    2. Hypernatremia -continue free water flushes     3. Heart failure with reduced ejection fraction [HFrEF with LVEF 15 to 20%] - patient will  need diuretics to maintain euvolemia-  4. Hypokalemia-corrected     Plan   restart Lasix tomorrow. [facility] [place] can be resumed in the outpatient. Prognosis is guarded.     Electronically signed by Yoana Noonan MD on 11/24/2022 at 12:33 PM

## 2022-11-25 ENCOUNTER — TELEPHONE (OUTPATIENT)
Dept: GASTROENTEROLOGY | Age: 67
End: 2022-11-25

## 2022-11-25 VITALS
DIASTOLIC BLOOD PRESSURE: 85 MMHG | HEIGHT: 75 IN | TEMPERATURE: 98 F | OXYGEN SATURATION: 97 % | WEIGHT: 159.83 LBS | HEART RATE: 93 BPM | BODY MASS INDEX: 19.87 KG/M2 | SYSTOLIC BLOOD PRESSURE: 120 MMHG | RESPIRATION RATE: 18 BRPM

## 2022-11-25 LAB
ABSOLUTE EOS #: 0.21 K/UL (ref 0–0.4)
ABSOLUTE LYMPH #: 0.62 K/UL (ref 1–4.8)
ABSOLUTE MONO #: 1.25 K/UL (ref 0.1–1.3)
ANION GAP SERPL CALCULATED.3IONS-SCNC: 10 MMOL/L (ref 9–17)
BASOPHILS # BLD: 0 % (ref 0–2)
BASOPHILS ABSOLUTE: 0 K/UL (ref 0–0.2)
BUN BLDV-MCNC: 66 MG/DL (ref 8–23)
CALCIUM SERPL-MCNC: 8.7 MG/DL (ref 8.6–10.4)
CHLORIDE BLD-SCNC: 108 MMOL/L (ref 98–107)
CO2: 26 MMOL/L (ref 20–31)
CREAT SERPL-MCNC: 1.6 MG/DL (ref 0.7–1.2)
EOSINOPHILS RELATIVE PERCENT: 2 % (ref 0–4)
GFR SERPL CREATININE-BSD FRML MDRD: 47 ML/MIN/1.73M2
GLUCOSE BLD-MCNC: 205 MG/DL (ref 75–110)
GLUCOSE BLD-MCNC: 213 MG/DL (ref 75–110)
GLUCOSE BLD-MCNC: 220 MG/DL (ref 75–110)
GLUCOSE BLD-MCNC: 240 MG/DL (ref 75–110)
GLUCOSE BLD-MCNC: 255 MG/DL (ref 70–99)
HCT VFR BLD CALC: 30.3 % (ref 41–53)
HEMOGLOBIN: 9.6 G/DL (ref 13.5–17.5)
LYMPHOCYTES # BLD: 6 % (ref 24–44)
MCH RBC QN AUTO: 30.1 PG (ref 26–34)
MCHC RBC AUTO-ENTMCNC: 31.7 G/DL (ref 31–37)
MCV RBC AUTO: 95.1 FL (ref 80–100)
MONOCYTES # BLD: 12 % (ref 1–7)
MORPHOLOGY: ABNORMAL
PDW BLD-RTO: 18.2 % (ref 11.5–14.9)
PLATELET # BLD: 133 K/UL (ref 150–450)
PMV BLD AUTO: 9.2 FL (ref 6–12)
POTASSIUM SERPL-SCNC: 4 MMOL/L (ref 3.7–5.3)
RBC # BLD: 3.19 M/UL (ref 4.5–5.9)
SEG NEUTROPHILS: 80 % (ref 36–66)
SEGMENTED NEUTROPHILS ABSOLUTE COUNT: 8.32 K/UL (ref 1.3–9.1)
SODIUM BLD-SCNC: 144 MMOL/L (ref 135–144)
WBC # BLD: 10.4 K/UL (ref 3.5–11)

## 2022-11-25 PROCEDURE — 2580000003 HC RX 258: Performed by: INTERNAL MEDICINE

## 2022-11-25 PROCEDURE — 82947 ASSAY GLUCOSE BLOOD QUANT: CPT

## 2022-11-25 PROCEDURE — 6370000000 HC RX 637 (ALT 250 FOR IP): Performed by: INTERNAL MEDICINE

## 2022-11-25 PROCEDURE — 6360000002 HC RX W HCPCS: Performed by: INTERNAL MEDICINE

## 2022-11-25 PROCEDURE — 85025 COMPLETE CBC W/AUTO DIFF WBC: CPT

## 2022-11-25 PROCEDURE — 6370000000 HC RX 637 (ALT 250 FOR IP)

## 2022-11-25 PROCEDURE — 6370000000 HC RX 637 (ALT 250 FOR IP): Performed by: STUDENT IN AN ORGANIZED HEALTH CARE EDUCATION/TRAINING PROGRAM

## 2022-11-25 PROCEDURE — 2500000003 HC RX 250 WO HCPCS: Performed by: INTERNAL MEDICINE

## 2022-11-25 PROCEDURE — 36415 COLL VENOUS BLD VENIPUNCTURE: CPT

## 2022-11-25 PROCEDURE — 80048 BASIC METABOLIC PNL TOTAL CA: CPT

## 2022-11-25 PROCEDURE — A4216 STERILE WATER/SALINE, 10 ML: HCPCS | Performed by: INTERNAL MEDICINE

## 2022-11-25 PROCEDURE — C9113 INJ PANTOPRAZOLE SODIUM, VIA: HCPCS | Performed by: INTERNAL MEDICINE

## 2022-11-25 RX ORDER — INSULIN LISPRO 100 [IU]/ML
0-4 INJECTION, SOLUTION INTRAVENOUS; SUBCUTANEOUS NIGHTLY
Status: DISCONTINUED | OUTPATIENT
Start: 2022-11-25 | End: 2022-11-25

## 2022-11-25 RX ORDER — INSULIN GLARGINE 100 [IU]/ML
12 INJECTION, SOLUTION SUBCUTANEOUS 2 TIMES DAILY
Status: DISCONTINUED | OUTPATIENT
Start: 2022-11-25 | End: 2022-11-25 | Stop reason: HOSPADM

## 2022-11-25 RX ORDER — FUROSEMIDE 40 MG/1
40 TABLET ORAL DAILY
Status: DISCONTINUED | OUTPATIENT
Start: 2022-11-25 | End: 2022-11-25 | Stop reason: HOSPADM

## 2022-11-25 RX ORDER — INSULIN LISPRO 100 [IU]/ML
0-8 INJECTION, SOLUTION INTRAVENOUS; SUBCUTANEOUS
Status: DISCONTINUED | OUTPATIENT
Start: 2022-11-25 | End: 2022-11-25 | Stop reason: HOSPADM

## 2022-11-25 RX ADMIN — SODIUM CHLORIDE, PRESERVATIVE FREE 40 MG: 5 INJECTION INTRAVENOUS at 08:39

## 2022-11-25 RX ADMIN — HYDROCODONE BITARTRATE AND ACETAMINOPHEN 1 TABLET: 5; 325 TABLET ORAL at 03:27

## 2022-11-25 RX ADMIN — FUROSEMIDE 40 MG: 40 TABLET ORAL at 13:12

## 2022-11-25 RX ADMIN — CLINDAMYCIN IN 5 PERCENT DEXTROSE 600 MG: 12 INJECTION, SOLUTION INTRAVENOUS at 04:27

## 2022-11-25 RX ADMIN — INSULIN GLARGINE 10 UNITS: 100 INJECTION, SOLUTION SUBCUTANEOUS at 08:39

## 2022-11-25 RX ADMIN — CLINDAMYCIN IN 5 PERCENT DEXTROSE 600 MG: 12 INJECTION, SOLUTION INTRAVENOUS at 13:25

## 2022-11-25 RX ADMIN — INSULIN LISPRO 2 UNITS: 100 INJECTION, SOLUTION INTRAVENOUS; SUBCUTANEOUS at 16:33

## 2022-11-25 RX ADMIN — SERTRALINE HYDROCHLORIDE 50 MG: 50 TABLET ORAL at 08:39

## 2022-11-25 RX ADMIN — ONDANSETRON 4 MG: 2 INJECTION INTRAMUSCULAR; INTRAVENOUS at 16:51

## 2022-11-25 RX ADMIN — INSULIN LISPRO 1 UNITS: 100 INJECTION, SOLUTION INTRAVENOUS; SUBCUTANEOUS at 08:39

## 2022-11-25 RX ADMIN — CEFEPIME 1000 MG: 1 INJECTION, POWDER, FOR SOLUTION INTRAMUSCULAR; INTRAVENOUS at 10:42

## 2022-11-25 RX ADMIN — SODIUM CHLORIDE, PRESERVATIVE FREE 10 ML: 5 INJECTION INTRAVENOUS at 08:44

## 2022-11-25 RX ADMIN — INSULIN LISPRO 2 UNITS: 100 INJECTION, SOLUTION INTRAVENOUS; SUBCUTANEOUS at 11:37

## 2022-11-25 ASSESSMENT — ENCOUNTER SYMPTOMS
DIARRHEA: 0
WHEEZING: 0
VOMITING: 0
SHORTNESS OF BREATH: 0
NAUSEA: 0
STRIDOR: 0
CONSTIPATION: 0
CHEST TIGHTNESS: 0
ABDOMINAL DISTENTION: 0
RHINORRHEA: 0
COUGH: 0
ABDOMINAL PAIN: 0

## 2022-11-25 ASSESSMENT — PAIN SCALES - GENERAL
PAINLEVEL_OUTOF10: 6
PAINLEVEL_OUTOF10: 0

## 2022-11-25 ASSESSMENT — PAIN DESCRIPTION - LOCATION: LOCATION: BACK

## 2022-11-25 ASSESSMENT — PAIN DESCRIPTION - DESCRIPTORS: DESCRIPTORS: ACHING

## 2022-11-25 ASSESSMENT — PAIN DESCRIPTION - ORIENTATION: ORIENTATION: LOWER

## 2022-11-25 NOTE — TELEPHONE ENCOUNTER
Patient first consulted with Dr Tiffanie Douglas. I will try to see if 1/24/23 works for the patient. If not we may have to schedule procedure with Dr. Matt Vega and any follow ups with Tiffanie Douglas.

## 2022-11-25 NOTE — PROGRESS NOTES
NEPHROLOGY PROGRESS NOTE    Patient :  Parish Verdugo; 79 y.o. MRN# [id number]  Location:  2098/2098-01  Attending:  Jennifer Pettit MD  Admit Date:  11/18/2022   Hospital Day: 7    Reason for consultation: Management of acute kidney injury. Consulting physician: Jennifer Pettit MD.    Interval history: Patient was seen and examined today  He is awake but aphasic. He does not appear to be in acute respiratory distress. Follows commands-  Receiving free water flushes via tube feeding reviewed-renal function improving BUN/creatinine 66  and 1.60  mg/dL. He is nonoliguric. History of Present Illness: This is a 79 y.o. 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 Patient had an episode of vomiting and developed shortness of breath and therefore aspiration pneumonia suspected patient was noted to be hypoxic with pulse ox in 70s on room air.   Patient initially was placed on nonrebreather and then patient was intubated last night currently on ventilator  Patient also noted to be hypotensive, on Levophed  Labs reviewed, serum creatinine on admission BUN was noted to be 139 and creatinine 2.5 mg/dL, patient's last serum creatinine on November 11, 2022 was 1.2 mg/dL  Patient is nonoliguric urine output 650 mL overnight    Current Medications: insulin glargine (LANTUS) injection vial 12 Units, BID  insulin lispro (HUMALOG) injection vial 0-8 Units, TID WC  furosemide (LASIX) tablet 40 mg, Daily  potassium chloride (KLOR-CON M) extended release tablet 40 mEq, PRN   Or  potassium bicarb-citric acid (EFFER-K) effervescent tablet 40 mEq, PRN   Or  potassium chloride 10 mEq/100 mL IVPB (Peripheral Line), PRN  bisacodyl (DULCOLAX) EC tablet 5 mg, Daily PRN  QUEtiapine (SEROQUEL) tablet 50 mg, Nightly  0.45 % sodium chloride infusion, Continuous  sodium chloride flush 0.9 % injection 5-40 mL, 2 times per day  sodium chloride flush 0.9 % injection 5-40 mL, PRN  ondansetron (ZOFRAN-ODT) disintegrating tablet 4 mg, Q8H PRN   Or  ondansetron (ZOFRAN) injection 4 mg, Q6H PRN  polyethylene glycol (GLYCOLAX) packet 17 g, Daily PRN  acetaminophen (TYLENOL) tablet 650 mg, Q6H PRN   Or  acetaminophen (TYLENOL) suppository 650 mg, Q6H PRN  potassium chloride 10 mEq/100 mL IVPB (Peripheral Line), PRN  glucose chewable tablet 16 g, PRN  dextrose bolus 10% 125 mL, PRN   Or  dextrose bolus 10% 250 mL, PRN  glucagon (rDNA) injection 1 mg, PRN  dextrose 10 % infusion, Continuous PRN  [Held by provider] bumetanide (BUMEX) tablet 1 mg, Daily  [Held by provider] carvedilol (COREG) tablet 6.25 mg, BID  [Held by provider] gabapentin (NEURONTIN) capsule 200 mg, Nightly  HYDROcodone-acetaminophen (NORCO) 5-325 MG per tablet 1 tablet, Q6H PRN  [Held by provider] sacubitril-valsartan (ENTRESTO) 24-26 MG per tablet 1 tablet, BID  [Held by provider] spironolactone (ALDACTONE) tablet 25 mg, Daily  sertraline (ZOLOFT) tablet 50 mg, Daily  pantoprazole (PROTONIX) 40 mg in sodium chloride (PF) 0.9 % 10 mL injection, Q12H    Objective:  CURRENT TEMPERATURE:  Temp: 96.8 °F (36 °C)  MAXIMUM TEMPERATURE OVER 24HRS:  Temp (24hrs), Av.7 °F (36.5 °C), Min:96.8 °F (36 °C), Max:98.5 °F (36.9 °C)    CURRENT RESPIRATORY RATE:  Resp: 19  CURRENT PULSE:  Heart Rate: 91  CURRENT BLOOD PRESSURE:  BP: 117/78  24HR BLOOD PRESSURE RANGE:  Systolic (08FSF), ZFT:143 , Min:117 , BFD:597   ; Diastolic (86TTN), DDV:76, Min:78, Max:92    24HR INTAKE/OUTPUT:    Intake/Output Summary (Last 24 hours) at 11/25/2022 1425  Last data filed at 11/25/2022 1318  Gross per 24 hour   Intake 1794.5 ml   Output 1225 ml   Net 569.5 ml       Patient Vitals for the past 96 hrs (Last 3 readings):   Weight   11/25/22 0415 159 lb 13.3 oz (72.5 kg)   11/24/22 0430 161 lb 2.5 oz (73.1 kg)   11/23/22 0210 151 lb 7.3 oz (68.7 kg)       Physical Exam:  GENERAL APPEARANCE: Awake and alert but with expressive aphasia. HEAD: Normocephalic and atraumatic  EYES:  EOMI. Not pale, anicteric   CARDIAC: Normal S1 and S2. No S3, S4 or murmurs. Rhythm is regular. LUNGS:diminished breath sounds. Coarse breath sounds no accessory muscle use  ABDOMEN: With PEG tube in situ. Normal bowel sounds. EXTREMITIES: No edema. Bilateral BKA  NEURO: Nonfocal    Labs:   CBC:  Recent Labs     11/23/22  0542 11/23/22  1713 11/24/22  0403 11/25/22  0534   WBC 8.7  --  10.5 10.4   RBC 3.15*  --  3.32* 3.19*   HGB 9.4* 9.6* 9.7* 9.6*   HCT 29.5* 31.8* 31.1* 30.3*   MCV 93.7  --  93.6 95.1   MCH 29.9  --  29.4 30.1   MCHC 31.9  --  31.4 31.7   RDW 18.0*  --  18.4* 18.2*     --  146* 133*   MPV 9.9  --  9.1 9.2        BMP:   Recent Labs     11/23/22  0542 11/24/22  0403 11/25/22  0534    145* 144   K 3.4* 4.0 4.0    108* 108*   CO2 24 25 26   BUN 86* 79* 66*   CREATININE 2.14* 1.75* 1.60*   GLUCOSE 207* 251* 255*   CALCIUM 8.7 8.8 8.7        Phosphorus:  No results for input(s): PHOS in the last 72 hours. Magnesium:   Recent Labs     11/23/22  0542   MG 3.1*       Albumin:   No results for input(s): LABALBU in the last 72 hours. \  No results for input(s): PROT, ALBCAL, ALBCAL, ALBPCT, LABALPH, A1PCT, LABALPH, A2PCT, LABBETA, BETAPCT, GAMGLOB, GGPCT, PATH in the last 72 hours. No results for input(s): VÍCTOR in the last 72 hours.      Urine Creatinine: No results for input(s): LABCREA in the last 72 hours. Urinalysis:    No results for input(s): Kay Duty, PHUR, LABCAST, WBCUA, RBCUA, MUCUS, TRICHOMONAS, YEAST, BACTERIA, CLARITYU, SPECGRAV, LEUKOCYTESUR, UROBILINOGEN, BILIRUBINUR, BLOODU, GLUCOSEU, KETUA, AMORPHOUS in the last 72 hours. Assessment/plan:    1. Acute kidney injury on chronic kidney disease stage 3-acute kidney injury in this patient is most consistent with ischemic acute tubular necrosis secondary to hypotension, prerenal azotemia from diuretics, Entresto and Aldactone. He is nonoliguric [Urine output of 525 mls  cc last 24 hours]. Plan:  Taper IV fluids  Monitor urine output closely. Basic metabolic profile daily. Continue free water flushes    2. Hypernatremia -continue free water flushes     3. Heart failure with reduced ejection fraction [HFrEF with LVEF 15 to 20%] - patient will  need diuretics to maintain euvolemia-    4. Hypokalemia-corrected     Plan   restart Lasix   Julee Manual can be resumed in the outpatient. No objections to discharge from renal standpoint  Prognosis is guarded.     Electronically signed by Fallon Meyers MD on 11/25/2022 at 2:25 PM

## 2022-11-25 NOTE — CARE COORDINATION
Patient will discharge to [address] [place] [address] [address] and the [facility]  [facility]   Phone [phone]  Fax [phone]    at 2000 via [employer]. Deven Ayala faxed to facility. Patient/Family informed of discharge and is agreeable. Bedside RN notified, Call report to [phone]. Brother Aurelio Mohs notified of discharge.   Family agreeable

## 2022-11-25 NOTE — PROGRESS NOTES
Dr. Chris Camarillo MD / Dr. Nuvia Vieyra MD / Dr. Brendan Shu MD Dr. Florencio Cancel MD Margreta Schlatter APRN CNP  Bren Babin CNP                  Pulmonary Consult Note   [facility]      Patient - Ange Lin,  Age - 79 y.o.    - 1955      Room Number - 9000/1967-82   MRN -  [id number]   Acct # - [de-identified]  Date of Admission -  2022 11:46 AM        Consulting Afia Manley MD  Primary Care Physician - Rickie Martinez MD     HPI   Vital signs stable. On 3L nasal canula. He will complete his antibiotics today. Plans for returning to SNF possibly today. He is resting comfortably in no distress. Overall still remains very weak. OBJECTIVE   VITALS    height is 6' 3\" (1.905 m) and weight is 159 lb 13.3 oz (72.5 kg). His axillary temperature is 97.8 °F (36.6 °C). His blood pressure is 122/87 and his pulse is 88. His respiration is 18 and oxygen saturation is 100%. Body mass index is 19.98 kg/m².   Temperature Range: Temp: 97.8 °F (36.6 °C) Temp  Av.2 °F (36.8 °C)  Min: 97.8 °F (36.6 °C)  Max: 98.5 °F (36.9 °C)  BP Range:  Systolic (08MVF), ECF:540 , Min:122 , XNH:146     Diastolic (17XMV), LDA:07, Min:85, Max:92    Pulse Range: Pulse  Av  Min: 88  Max: 95  Respiration Range: Resp  Av  Min: 18  Max: 18  Current Pulse Ox[de-identified]  SpO2: 100 %  24HR Pulse Ox Range:  SpO2  Av.3 %  Min: 95 %  Max: 100 %  Oxygen Amount and Delivery: O2 Flow Rate (L/min): 3 L/min    Wt Readings from Last 3 Encounters:   22 159 lb 13.3 oz (72.5 kg)   22 156 lb 8.4 oz (71 kg)   22 150 lb (68 kg)       I/O (24 Hours)    Intake/Output Summary (Last 24 hours) at 2022 0630  Last data filed at 2022 0530  Gross per 24 hour   Intake 3336.33 ml   Output 1225 ml   Net 2111.33 ml       EXAM     General Appearance generalized weakness  HEENT - normocephalic, atraumatic Neck - Supple,  trachea midline   Lungs -nonlabored respirations.   Lung sounds are slightly diminished posteriorly  Cardiovascular - Heart sounds are normal.  Regular rate and rhythm   Abdomen - Soft, nontender, nondistended, no masses or organomegaly  Neurologic - There are no focal motor or sensory deficits  Skin - No bruising or bleeding  Extremities -below the knee amputation bilaterally    MEDS      insulin lispro  0-4 Units SubCUTAneous TID WC    insulin lispro  0-4 Units SubCUTAneous Nightly    insulin glargine  10 Units SubCUTAneous BID    QUEtiapine  50 mg Oral Nightly    sodium chloride flush  5-40 mL IntraVENous 2 times per day    cefepime  1,000 mg IntraVENous Q12H    [Held by provider] bumetanide  1 mg Per G Tube Daily    [Held by provider] carvedilol  6.25 mg Per G Tube BID    [Held by provider] furosemide  40 mg Per G Tube Daily    [Held by provider] gabapentin  200 mg Per G Tube Nightly    [Held by provider] sacubitril-valsartan  1 tablet Per G Tube BID    [Held by provider] spironolactone  25 mg Per G Tube Daily    sertraline  50 mg Per G Tube Daily    clindamycin (CLEOCIN) IV  600 mg IntraVENous Q8H    heparin (porcine)  4,000 Units IntraVENous Once    pantoprazole (PROTONIX) 40 mg injection  40 mg IntraVENous Q12H      sodium chloride 35 mL/hr at 11/24/22 2107    dextrose      [Held by provider] heparin (PORCINE) Infusion Stopped (11/20/22 0604)     potassium chloride **OR** potassium alternative oral replacement **OR** potassium chloride, bisacodyl, sodium chloride flush, ondansetron **OR** ondansetron, polyethylene glycol, acetaminophen **OR** acetaminophen, potassium chloride, glucose, dextrose bolus **OR** dextrose bolus, glucagon (rDNA), dextrose, HYDROcodone-acetaminophen, [Held by provider] heparin (porcine), [Held by provider] heparin (porcine)    LABS   CBC   Recent Labs     11/25/22  0534   WBC 10.4   HGB 9.6*   HCT 30.3*   MCV 95.1   *     BMP:   Lab Results   Component Value Date/Time     11/25/2022 05:34 AM    K 4.0 11/25/2022 05:34 AM     11/25/2022 05:34 AM    CO2 26 11/25/2022 05:34 AM    BUN 66 11/25/2022 05:34 AM    LABALBU 3.6 11/18/2022 12:25 PM    CREATININE 1.60 11/25/2022 05:34 AM    CALCIUM 8.7 11/25/2022 05:34 AM    GFRAA 33 09/28/2022 04:04 AM    LABGLOM 47 11/25/2022 05:34 AM     ABGs:  Lab Results   Component Value Date/Time    PHART 7.412 11/20/2022 04:22 AM    PO2ART 110.0 11/20/2022 04:22 AM    YXR5KGX 49.7 11/20/2022 04:22 AM      Lab Results   Component Value Date/Time    MODE PRVC 11/20/2022 04:22 AM     Ionized Calcium:  No results found for: IONCA  Magnesium:    Lab Results   Component Value Date/Time    MG 3.1 11/23/2022 05:42 AM     Phosphorus:    Lab Results   Component Value Date/Time    PHOS 4.7 11/07/2022 03:50 AM        LIVER PROFILE No results for input(s): AST, ALT, LIPASE, BILIDIR, BILITOT, ALKPHOS in the last 72 hours. Invalid input(s): AMYLASE,  ALB  INR No results for input(s): INR in the last 72 hours. 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  Please notify our service if we can be of any assistance  I spoke to nursing staff    Electronically signed by BARBARA Vásquez - CNP on 11/25/2022 at 6:30 AM

## 2022-11-25 NOTE — PLAN OF CARE
Problem: Discharge Planning  Goal: Discharge to home or other facility with appropriate resources  Outcome: Progressing  Note: Nephrology and pulmonary have signed off. Waiting for discharge orders from primary. Possible discharge today back to [facility]. Problem: Chronic Conditions and Co-morbidities  Goal: Patient's chronic conditions and co-morbidity symptoms are monitored and maintained or improved  Outcome: Progressing     Problem: Safety - Medical Restraint  Goal: Remains free of injury from restraints (Restraint for Interference with Medical Device)  Description: INTERVENTIONS:  1. Determine that other, less restrictive measures have been tried or would not be effective before applying the restraint  2. Evaluate the patient's condition at the time of restraint application  3. Inform patient/family regarding the reason for restraint  4. Q2H: Monitor safety, psychosocial status, comfort, nutrition and hydration  Outcome: Progressing   No restraints needed. Problem: Pain  Goal: Verbalizes/displays adequate comfort level or baseline comfort level  Outcome: Progressing  Note: Pt has complained of some back pain and is getting Norco every 6 hours as needed. Problem: Skin/Tissue Integrity  Goal: Absence of new skin breakdown  Description: 1. Monitor for areas of redness and/or skin breakdown  2. Assess vascular access sites hourly  3. Every 4-6 hours minimum:  Change oxygen saturation probe site  4. Every 4-6 hours:  If on nasal continuous positive airway pressure, respiratory therapy assess nares and determine need for appliance change or resting period. Outcome: Progressing  Note: Pt remains absent of new skin breakdown for this shift       Problem: Safety - Adult  Goal: Free from fall injury  Outcome: Progressing  Note: The patient remained free from falls this shift, call light within reach, bed in locked and lowest position. Side rails up x2. Continue to monitor closely.        Problem: ABCDS Injury Assessment  Goal: Absence of physical injury  Outcome: Progressing  Note: Pt remained free from injuries and continues to use call light appropriately when wanting to get up. Problem: Nutrition Deficit:  Goal: Optimize nutritional status  Outcome: Progressing  Note: Pt tube feeding has been increase from 45 to 50ml/hr.

## 2022-11-25 NOTE — PROGRESS NOTES
RN spoke with Dr. Dorian Mario during rounds. Okay to d/c home today. Okay to restart lasix and hold entresto until PCP follow up. BMP in 1 week.

## 2022-11-25 NOTE — PROGRESS NOTES
11/25/22 1548   Encounter Summary   Encounter Overview/Reason  Spiritual/Emotional Needs   Service Provided For: Patient   Referral/Consult From: Rounding   Complexity of Encounter Low   Spiritual/Emotional needs   Type Spiritual Support   Assessment/Intervention/Outcome   Assessment Unable to assess   Intervention Prayer (assurance of)/[place]

## 2022-11-25 NOTE — PROGRESS NOTES
[address] [facility]    PROGRESS NOTE             11/25/2022    10:40 AM    Name:   Tej Gallegos  MRN:     [id number]     Acct:      [de-identified]   Room:   2098/2098-01  IP Day:  7  Admit Date:  11/18/2022 11:46 AM    PCP:  PROVIDER Flakito Garber MD  Code Status:  Full Code    Subjective:     C/C:   Chief Complaint   Patient presents with    Shortness of Breath     Interval History Status: improved. The patient was seen and examined at the bedside. No acute overnight events as per patient and RN. Hgb now stable. Telesitter discontinued yesterday am. Possible discharge today. Pulmonology wants CT OP in 4-6 weeks. Signed off     Vital signs stable. Harris removed      Brief History:     The patient is a 79 y.o. 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 The nursing staff had concerns for aspiration. His oxygen saturation dropped to the 70s. The patient was transferred to [facility] for evaluation. In the ED, the patient was tachycardic (106), tachypneic (RR 25), and hypoxic (02 saturation in 70s). The patient was started on a 100% non re-breather. The patient was then weaned down to a 10 L salter (02 saturation 98%). The salter was then decreased to 8L. Chest x-ray showed bibasilar pulmonary opacities suggesting pneumonia. The patient was started on Cefepime and Clindamycin. EKG showed sinus tachycardia with 1st degree AV block, left anterior fascicular block, and anterolateral infarct (age undetermined). WBCS were elevated at 16.7. Hgb was 11.3. ABGs showed pH 7.405, pC02 61.4, p02 81.2, Hc03 38.4. Blood cultures were drawn and results are pending.  The patient was given normal saline bolus. The patient was admitted for acute hypoxic, hypercapnic respiratory failure secondary to suspected bilateral aspiration pneumonia    Review of Systems:     Review of Systems   Constitutional:  Negative for activity change, chills, fatigue and fever. HENT:  Negative for congestion and rhinorrhea. Respiratory:  Negative for cough, chest tightness, shortness of breath, wheezing and stridor. Cardiovascular:  Negative for chest pain, palpitations and leg swelling. Gastrointestinal:  Negative for abdominal distention, abdominal pain, constipation, diarrhea, nausea and vomiting. Endocrine: Negative for cold intolerance and heat intolerance. Genitourinary:  Negative for dysuria, frequency and urgency. Musculoskeletal:  Negative for arthralgias and myalgias. Skin:  Negative for pallor and rash. Neurological:  Negative for dizziness, tremors, syncope, weakness, light-headedness and headaches. Psychiatric/Behavioral:  Negative for agitation and confusion. Medications: Allergies:     Allergies   Allergen Reactions    Penicillins Rash       Current Meds:   Scheduled Meds:    insulin glargine  12 Units SubCUTAneous BID    insulin lispro  0-8 Units SubCUTAneous TID     QUEtiapine  50 mg Oral Nightly    sodium chloride flush  5-40 mL IntraVENous 2 times per day    cefepime  1,000 mg IntraVENous Q12H    [Held by provider] bumetanide  1 mg Per G Tube Daily    [Held by provider] carvedilol  6.25 mg Per G Tube BID    [Held by provider] furosemide  40 mg Per G Tube Daily    [Held by provider] gabapentin  200 mg Per G Tube Nightly    [Held by provider] sacubitril-valsartan  1 tablet Per G Tube BID    [Held by provider] spironolactone  25 mg Per G Tube Daily    sertraline  50 mg Per G Tube Daily    clindamycin (CLEOCIN) IV  600 mg IntraVENous Q8H    pantoprazole (PROTONIX) 40 mg injection  40 mg IntraVENous Q12H     Continuous Infusions:    sodium chloride 35 mL/hr at 227    dextrose       PRN Meds: potassium chloride **OR** potassium alternative oral replacement **OR** potassium chloride, bisacodyl, sodium chloride flush, ondansetron **OR** ondansetron, polyethylene glycol, acetaminophen **OR** acetaminophen, potassium chloride, glucose, dextrose bolus **OR** dextrose bolus, glucagon (rDNA), dextrose, HYDROcodone-acetaminophen    Data:     Past Medical History:   has a past medical history of 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 ([facility] Acquired absence of right leg below knee ([facility] Acute kidney failure ([facility] Acute respiratory failure with hypoxia ([facility] Cerebral infarction due to embolism ([facility] Chronic kidney disease, unspecified, Chronic systolic (congestive) heart failure ([facility] Depression, Heart failure ([facility] Hypertension, Muscle weakness (generalized), Other symbolic dysfunctions, Peripheral vascular disease ([facility] and Type 2 diabetes mellitus ([facility] Social History:        Family History: History reviewed. No pertinent family history. Vitals:  BP (!) 122/92   Pulse 92   Temp 97.7 °F (36.5 °C) (Axillary)   Resp 18   Ht 6' 3\" (1.905 m)   Wt 159 lb 13.3 oz (72.5 kg)   SpO2 96%   BMI 19.98 kg/m²   Temp (24hrs), Av.1 °F (36.7 °C), Min:97.7 °F (36.5 °C), Max:98.5 °F (36.9 °C)    Recent Labs     22  1147 22  1626 22  1943 22  0616   POCGLU 225* 202* 205* 220*       I/O(24Hr):     Intake/Output Summary (Last 24 hours) at 2022 1040  Last data filed at 2022 0844  Gross per 24 hour   Intake 1794.5 ml   Output 1225 ml   Net 569.5 ml       Labs:  [unfilled]    Lab Results   Component Value Date/Time    SPECIAL RIGHT HAND 2022 11:56 AM     Lab Results   Component Value Date/Time    CULTURE NORMAL RESPIRATORY MOIRA MODERATE GROWTH 2022 06:23 PM       [unfilled]    Radiology:    CT CHEST WO CONTRAST    Result Date: 2022  EXAMINATION: CT OF THE CHEST WITHOUT CONTRAST 2022 10:04 am TECHNIQUE: CT of the chest was performed without the administration of intravenous contrast. Multiplanar reformatted images are provided for review. Automated exposure control, iterative reconstruction, and/or weight based adjustment of the mA/kV was utilized to reduce the radiation dose to as low as reasonably achievable. COMPARISON: 11/20/2022 chest radiograph, 10/19/2022 outside study HISTORY: ORDERING SYSTEM PROVIDED HISTORY: History of cavitary lesions/nodules-please compare with [facility] CT chest from September TECHNOLOGIST PROVIDED HISTORY: History of cavitary lesions/nodules-please compare with [facility]'s CT chest from September Reason for Exam: History of cavitary lesions/nodules-please compare with [facility]'s CT chest from September Additional signs and symptoms: Pt unable to answer questions or hold arms above head; pt vocalizing during exam FINDINGS: Chest Wall and Thoracic Inlet: No axillary or supraclavicular lymphadenopathy. Mediastinum and Jazmin: Scattered prominent mediastinal lymph nodes. A reference precarinal node measures 1.5 cm (series 2, image 49), not significantly changed. The ascending thoracic aorta is mildly dilated measuring 4.2 cm (series 2, image 66), unchanged. Mild-to-moderate atherosclerosis of the thoracic aorta. Cardiomegaly. Chronic severe coronary artery calcifications. Calcifications are seen along the left ventricular myocardium, which can be seen in the setting of prior infarct. No pericardial effusion. Lungs/Pleura: Mild-to-moderate paraseptal emphysema. Mild centrilobular emphysema. There are right middle and lower lobe nodular and consolidative opacities which have increased from prior, concerning for an infectious or inflammatory etiology. Significantly improved left upper lobe ground-glass opacities. The previously visualized left upper lobe cavitary nodule is no longer present. Small left pleural effusion with partial left lower lobe collapse.  Upper Abdomen: Left upper pole renal cyst. Bones: No suspicious osseous findings. Degenerative changes of the spine. There is a 1.8 x 1.3 cm exophytic soft tissue lesion arising from the right upper chest wall. Correlate with direct inspection. 1. Significantly improved but not entirely resolved left upper lobe nodules with surrounding ground-glass attenuation, some of which previously demonstrated cavitation. 2.  There are slightly increased right middle and lower lobe nodular consolidative opacities, concerning for persistent infectious/inflammatory process. Follow-up to resolution should be considered. 3.  Small left pleural effusion with partial collapse of the left lower lobe. 4.  Scattered prominent mediastinal lymph nodes are likely reactive. 5.  There is a 1.8 cm exophytic soft tissue lesion arising from the right upper chest wall. Correlate with direct inspection. 6.  Mild dilation of the ascending thoracic aorta measuring up to 4.2 cm. US RENAL LIMITED    Result Date: 11/19/2022  EXAMINATION: ULTRASOUND OF THE KIDNEYS 11/19/2022 6:22 am COMPARISON: None. HISTORY: ORDERING SYSTEM PROVIDED HISTORY: EDELMIRA TECHNOLOGIST PROVIDED HISTORY: EDLEMIRA FINDINGS: The right kidney measures 10.9 cm in length and the left kidney measures 10.9 cm in length. Bilateral renal cysts measuring measuring up to 2.5 cm on the right and 3.1 cm on the left. Kidneys otherwise demonstrate normal cortical echogenicity. No hydronephrosis or intrarenal stones. No focal lesions. Benign bilateral renal cysts     XR CHEST PORTABLE    Result Date: 11/20/2022  EXAMINATION: ONE XRAY VIEW OF THE CHEST 11/20/2022 2:53 am COMPARISON: 11/19/2022 HISTORY: ORDERING SYSTEM PROVIDED HISTORY: ETT placement TECHNOLOGIST PROVIDED HISTORY: ETT placement Reason for Exam: ETT placement Additional signs and symptoms: ETT placement Relevant Medical/Surgical History: ETT placement FINDINGS: There is an ET tube with the tip 5.3 cm above the amparo.  There is an NG tube overlying the left upper quadrant, however the most distal portion of the tube is not visualized. Cardiac size is enlarged. Stable bibasal infiltrates with underlying left basilar atelectasis. .  The pulmonary vascularity is stable. No pneumothorax. No significant pleural effusions identified . Stanley Ogren Stable chest.     XR CHEST PORTABLE    Result Date: 11/19/2022  EXAMINATION: ONE XRAY VIEW OF THE CHEST 11/19/2022 5:49 am COMPARISON: 11/18/2022 HISTORY: ORDERING SYSTEM PROVIDED HISTORY: ETT placement TECHNOLOGIST PROVIDED HISTORY: ETT placement Reason for Exam: ETT placement Additional signs and symptoms: ETT placement Relevant Medical/Surgical History: ETT placement FINDINGS: Endotracheal tube approximately 6 cm above the amparo. Enteric catheter courses into the abdomen. Heart size stable. Bibasilar pulmonary opacities. No pneumothorax. No new airspace disease. Persistent bibasilar pulmonary opacities could represent atelectasis or pneumonia     XR CHEST PORTABLE    Result Date: 11/18/2022  EXAMINATION: ONE XRAY VIEW OF THE CHEST 11/18/2022 6:00 pm COMPARISON: Radiograph performed earlier the same day HISTORY: ORDERING SYSTEM PROVIDED HISTORY: intubated TECHNOLOGIST PROVIDED HISTORY: intubated Reason for Exam: Intubation FINDINGS: Endotracheal tube terminates 7 cm above the amparo. Enteric tube is below the diaphragm. Cardiomediastinal silhouette is unchanged in size. Aortic atherosclerosis. No large pleural effusion or pneumothorax. There is patchy opacity in the right lung base. There is linear scarring/atelectasis in the left lung base. 1. Lines and tubes in expected position. 2. Bibasilar pulmonary opacities are again noted. XR CHEST PORTABLE    Result Date: 11/18/2022  EXAMINATION: ONE XRAY VIEW OF THE CHEST 11/18/2022 11:58 am COMPARISON: 09/27/2022 HISTORY: ORDERING SYSTEM PROVIDED HISTORY: sob TECHNOLOGIST PROVIDED HISTORY: sob Reason for Exam: sob FINDINGS: Stable cardiomegaly. Bibasilar pulmonary opacities noted. No pulmonary vascular congestion or edema. No pneumothorax. Bibasilar pulmonary opacities suggesting pneumonia         Physical Examination:        Physical Exam  Constitutional:       General: He is not in acute distress. HENT:      Head: Normocephalic and atraumatic. Nose: No congestion or rhinorrhea. Eyes:      Extraocular Movements: Extraocular movements intact. Conjunctiva/sclera: Conjunctivae normal.      Pupils: Pupils are equal, round, and reactive to light. Cardiovascular:      Rate and Rhythm: Normal rate and regular rhythm. Pulses: Normal pulses. Heart sounds: Normal heart sounds. Pulmonary:      Effort: Pulmonary effort is normal. No respiratory distress. Breath sounds: Normal breath sounds. No wheezing, rhonchi or rales. Abdominal:      General: Abdomen is flat. Bowel sounds are normal. There is no distension. Tenderness: There is no abdominal tenderness. There is no guarding. Musculoskeletal:      Right lower leg: No edema. Left lower leg: No edema. Comments: Bilateral BKA   Skin:     Capillary Refill: Capillary refill takes less than 2 seconds. Coloration: Skin is not jaundiced or pale. Neurological:      General: No focal deficit present. Mental Status: He is oriented to person, place, and time. Sensory: No sensory deficit. Motor: No weakness.    Psychiatric:         Mood and Affect: Mood normal.         Assessment:        Primary Problem  Acute respiratory failure with hypoxia and hypercapnia ([facility] [address]    Active Hospital Problems    Diagnosis Date Noted    Coffee ground emesis [K92.0] 11/20/2022     Priority: Medium    Rectal bleeding [K62.5] 11/20/2022     Priority: Medium    Acute respiratory failure with hypoxia and hypercapnia (HCC) [J96.01, J96.02] 11/18/2022     Priority: Medium    Aspiration pneumonia, unspecified aspiration pneumonia type, unspecified laterality, unspecified part of lung [facility] [J69.0] 11/18/2022     Priority: Medium       Plan:        Acute hypoxic hypercapnic respiratory failure 2/2 to bilateral aspiration pneumonia   -Patient was hypoxic on presentation (02 sat in 70s); currently intubated on ventilator; currently saturating 100%; Fi02 40.   -CXR 11/20: stable bibasilar infiltrates with underlying left basilar atelectasis  -CT chest 11/22: significant improvement in left upper lobe nodules with surrounding ground glass attenuation; slightly increased right middle and lower lobe nodular consolidative opacities, concerning for persistent infectious/inflammatory process; small left pleural effusion with partial collapse of left lower lobe; exophytic soft tissue lesion arising from right upper chest wall; mild dilation of ascending thoracic aorta. -WBCS elevated at 16.7; today 10.4  -ABG on admission: pH 7.405, pC02 61.4, p02 81.2, HC03 38.4  -ABGs most recent : pH 7.412, pC02 49.7, p02 110., HC03 31.7  -Cefepime and Clindamycin given in ED; continue IV Cefepime and Clindamycin (last dose today)   -Off Propfol, Levophed, vasopressin  -Patient is off heparin drip due to clots found in stool and hgb drop to 7.0  -Blood cultures: no growth at 5 days  -Respiratory culture : normal amanda   -Pro-calcitonin 0.27  -Aspiration precautions  -Pulmonology/critical care on board      Acute on Chronic kidney disease  -Creatinine on admission 2.54 (baseline 1.31); Cr today 1.60 (trending down)   -Renal ultrasound: benign bilateral renal cysts.     -Nephrology consulted (taper IVF , continue to hold diuretics, Entresto, spironolactone, continue free water flushes)       DM2  -Glucose on admission: 186; today 220  -start medium dose sliding scale; continue 12 U lantus  -POCT x4  -initiate hypoglycemic protocol  -Hold Gabapentin 200 mg nightly      Hyponatremia-resolved  -Na on admission 132; today 144  -Continue to monitor   -Nephrology consulted      History of embolic stroke  -Hold Warfarin 10 mg po daily (unclear if Warfarin is for severe peripheral artery disease or for possible history of atrial fibrillation)  -Patient is off heparin drip due to clots found in stool and hgb drop to 7.0  -monitor H + H   -Telemetry continuous monitoring      Anemia  -Hgb on admission 11.3; today 9.8  -Hold heparin drip  -Continue Protonix 40 mg IV q 12   -EGD yesterday: stomach showed gastritis; no active bleed.  Duodenum showed inflammatory polyps; no obstruction or bleeds  -GI consulted  (Continue IV PPI, repeat endoscopy OP, colonoscopy for OP, continue H and H monitoring)     Elevated troponin   -Troponin on admission 160; 263 9/28  -EKG: sinus tachycardia with 1st degree AV block, anterior lateral infarct age undetermined   -Heparin on hold      CHF (EF (10-15%)  -EKG: sinus tachycardia with 1st degree AV block, left anterior fascicular block, and anterolateral infarct (age undetermined)  - Echo 9/28/22: EF < 20; RV function appears impaired; AV is sclerotic  -Hold Bumex  -Hold Coreg 6.25 mg BID  -Hold Lasix 40 mg daily  -Hold Entresto BID  -Hold Aldactone 25 mg daily       Depression/anxiety  -Continue Zoloft 50 mg daily  -Continue Seroquel 50 mg po nightly      DVT prophylaxis: Hold home Warfarin 10 mg po daily; Hold Heparin   Code: Full code  Diet: NPO  PT/OT/SW consulted    Noris Pearson MD  11/25/2022  10:40 AM

## 2022-11-25 NOTE — TELEPHONE ENCOUNTER
----- Message from BARBARA Stoner CNP sent at 11/24/2022 11:17 AM EST -----  Outpt colonoscopy in 8 weeks with BARBARA Pitts CNP

## 2022-11-25 NOTE — PROGRESS NOTES
Physician Progress Note      PATIENT:               Sp Garber  [facility] #:                  [id number]  :                       1955  ADMIT DATE:       2022 11:46 AM  DISCH DATE:  RESPONDING  PROVIDER #:        Maida Vann MD          QUERY TEXT:    Pt admitted with acute respiratory failure d/t aspiration pneumonia. On   presentation, WBC 27.8 w/ 7% bands and a ProCal of 0.27. Noted documentation   Septic Shock by the pulmonary consultant. If possible, please document in   progress notes and discharge summary:    The medical record reflects the following:  -Risk Factors: CHF, PEG, CKD  -Clinical Indicators: tachycardic with hypotension, hemodynamic support needed   with Norepi and Vaso. Intubation required for worsening resp status and   mental status changes. Concern for aspiration. Developed ATN. WBC 27.8, 7%   bands, ProCal 0.27; HR up to 107, T 100.4, Resp Rate up to 36. SOFA score 8  -Treatment: intubation, Cefepime, Clindamycin, Levo, Vaso, Pulm consult  Options provided:  -- Sepsis confirmed POA  -- Sepsis has been ruled out  -- Other - I will add my own diagnosis  -- Disagree - Not applicable / Not valid  -- Disagree - Clinically unable to determine / Unknown  -- Refer to Clinical Documentation Reviewer    PROVIDER RESPONSE TEXT:    This patient has sepsis that was POA.     Query created by: Prema Michelle on 2022 12:48 PM      Electronically signed by:  Maida Vann MD 2022 11:06 AM

## 2022-11-28 LAB
ABO/RH: NORMAL
ANTIBODY SCREEN: NEGATIVE
ARM BAND NUMBER: NORMAL
BLD PROD TYP BPU: NORMAL
BLOOD BANK BLOOD PRODUCT EXPIRATION DATE: NORMAL
BLOOD BANK COMMENT: NORMAL
BLOOD BANK ISBT PRODUCT BLOOD TYPE: 6200
BLOOD BANK PRODUCT CODE: NORMAL
BLOOD BANK UNIT TYPE AND RH: NORMAL
BPU ID: NORMAL
CROSSMATCH RESULT: NORMAL
DISPENSE STATUS BLOOD BANK: NORMAL
EXPIRATION DATE: NORMAL
TRANSFUSION STATUS: NORMAL
UNIT DIVISION: 0
UNIT ISSUE DATE/TIME: NORMAL

## 2022-11-29 ENCOUNTER — HOSPITAL ENCOUNTER (OUTPATIENT)
Age: 67
Setting detail: SPECIMEN
Discharge: HOME | End: 2022-11-29

## 2022-11-29 DIAGNOSIS — K62.5 RECTAL BLEEDING: Primary | ICD-10-CM

## 2022-11-29 LAB
INR BLD: 1.7
PROTHROMBIN TIME: 17 SEC (ref 9.1–12.3)

## 2022-11-29 PROCEDURE — 85610 PROTHROMBIN TIME: CPT

## 2022-11-29 PROCEDURE — 36415 COLL VENOUS BLD VENIPUNCTURE: CPT

## 2022-11-29 PROCEDURE — P9603 ONE-WAY ALLOW PRORATED MILES: HCPCS

## 2022-11-29 NOTE — TELEPHONE ENCOUNTER
Called the patient to schedule and he answered the phone. Stated that he cannot hear. Asked if he had someone there to help him and he just kept saying that he cannot hear and then hung up.

## 2022-11-29 NOTE — TELEPHONE ENCOUNTER
Called [facility] since that is where the patient resides. [phone]  Spoke to Annabella Martinez and scheduled the colonoscopy for 1/24/23 8:15 am.  Reviewed bowel prep instructions with Annabella Martinez over phone and faxed to [phone]. Informed that we will send a cardiac clearance and let them know the stop date for the coumadin. The patient will be taking transport. Informed Annabella Martinez that per Chadwick Forbes (pre-op) @ [facility] can only do that if it is medical transport and if they are okay with taking full responsibility due to the anesthesia.

## 2022-12-01 ENCOUNTER — HOSPITAL ENCOUNTER (OUTPATIENT)
Age: 67
Setting detail: SPECIMEN
Discharge: HOME | End: 2022-12-01

## 2022-12-01 LAB
INR BLD: 1.6
PROTHROMBIN TIME: 16.9 SEC (ref 9.1–12.3)

## 2022-12-01 PROCEDURE — 36415 COLL VENOUS BLD VENIPUNCTURE: CPT

## 2022-12-01 PROCEDURE — P9603 ONE-WAY ALLOW PRORATED MILES: HCPCS

## 2022-12-01 PROCEDURE — 85610 PROTHROMBIN TIME: CPT

## 2022-12-05 ENCOUNTER — HOSPITAL ENCOUNTER (OUTPATIENT)
Age: 67
Setting detail: SPECIMEN
Discharge: HOME | End: 2022-12-05

## 2022-12-05 LAB
INR BLD: 2.5
PROTHROMBIN TIME: 24.5 SEC (ref 9.1–12.3)

## 2022-12-05 PROCEDURE — 85610 PROTHROMBIN TIME: CPT

## 2022-12-05 PROCEDURE — P9603 ONE-WAY ALLOW PRORATED MILES: HCPCS

## 2022-12-05 PROCEDURE — 36415 COLL VENOUS BLD VENIPUNCTURE: CPT

## 2022-12-06 RX ORDER — POLYETHYLENE GLYCOL 3350, SODIUM SULFATE ANHYDROUS, SODIUM BICARBONATE, SODIUM CHLORIDE, POTASSIUM CHLORIDE 236; 22.74; 6.74; 5.86; 2.97 G/4L; G/4L; G/4L; G/4L; G/4L
4 POWDER, FOR SOLUTION ORAL ONCE
Qty: 4000 ML | Refills: 0 | Status: SHIPPED | OUTPATIENT
Start: 2022-12-06 | End: 2022-12-06

## 2022-12-08 ENCOUNTER — HOSPITAL ENCOUNTER (OUTPATIENT)
Age: 67
Setting detail: SPECIMEN
Discharge: HOME | End: 2022-12-08

## 2022-12-08 LAB
INR BLD: 1.9
PROTHROMBIN TIME: 18.9 SEC (ref 9.1–12.3)

## 2022-12-08 PROCEDURE — P9603 ONE-WAY ALLOW PRORATED MILES: HCPCS

## 2022-12-08 PROCEDURE — 85610 PROTHROMBIN TIME: CPT

## 2022-12-08 PROCEDURE — 36415 COLL VENOUS BLD VENIPUNCTURE: CPT

## 2022-12-12 ENCOUNTER — HOSPITAL ENCOUNTER (OUTPATIENT)
Age: 67
Setting detail: SPECIMEN
Discharge: HOME | End: 2022-12-12

## 2022-12-12 LAB
INR BLD: 2.1
PROTHROMBIN TIME: 21.5 SEC (ref 9.1–12.3)

## 2022-12-12 PROCEDURE — 85610 PROTHROMBIN TIME: CPT

## 2022-12-12 PROCEDURE — P9603 ONE-WAY ALLOW PRORATED MILES: HCPCS

## 2022-12-12 PROCEDURE — 36415 COLL VENOUS BLD VENIPUNCTURE: CPT

## 2022-12-13 NOTE — DISCHARGE SUMMARY
[address]    Discharge Summary     Patient ID: Misbah Kelsey  :  1955   MRN: [id number]     ACCOUNT:  [de-identified]   Patient's PCP: Ynse Levine MD  Admit Date: 2022   Discharge Date: 2022  Length of Stay: 7  Code Status:  Prior  Admitting Physician: Cristina Kim MD  Discharge Physician: Wendi Roberts MD     Active Discharge Diagnoses:       Primary Problem  Acute respiratory failure with hypoxia and hypercapnia [facility])      [facility] Problems    Diagnosis Date Noted    Coffee ground emesis [K92.0] 2022     Priority: Medium    Rectal bleeding [K62.5] 2022     Priority: Medium    Acute respiratory failure with hypoxia and hypercapnia ([facility] [address] [J96.01, J96.02] 2022     Priority: Medium    Aspiration pneumonia, unspecified aspiration pneumonia type, unspecified laterality, unspecified part of lung ([facility] [address] [J69.0] 2022     Priority: Medium       Admission Condition:  fair     Discharged Condition: good    Hospital Stay:       Hospital Course:  Misbah Kelsey is a 79 y.o. male with a past medical history of left BKA guillotine amputation (), embolic stroke (with expression aphasia), DM2, right BKA, CHF (EF (10-15%), bilateral cavitary nodules/lesions, hypertension, hyperlipidemia, who presented from the nursing home with nausea, vomiting, and shortness of breath. The nursing staff had concerns for aspiration. His oxygen saturation dropped to the 70s. The patient was transferred to [facility] for evaluation. Chest x-ray showed stable bibasilar infiltrates with underlying left basilar atelectasis. ABGs showed hypercapnia. The patient was admitted for acute hypoxic hypercapnic respiratory failure secondary to bilateral aspiration pneumonia. During admission the patient was intubated and placed on a ventilator.   Patient was intermittently on propofol, Levophed, and vasopressin. Hemoglobin dropped to 7 and heparin drip was discontinued. The patient was transfused 1 unit of PRBCs, and hemoglobin improved. The patient received IV cefepime and clindamycin during admission. Patient was also found to have acute on chronic kidney disease (baseline creatinine 1.31; creatinine on admission 2.54). She developed hyponatremia during admission, which resolved over the course of a few days. Nephrology was consulted. GI was consulted and the patient received IV PPIs. GI recommended outpatient follow-up for repeat endoscopy and colonoscopy. The patient's diabetes was managed with 12 units of Lantus and medium dose sliding scale. The patient was discharged with instruction to follow-up outpatient. Significant therapeutic interventions: Follow-up outpatient    Significant Diagnostic Studies:   Labs / Micro:  CBC:   Lab Results   Component Value Date/Time    WBC 10.4 11/25/2022 05:34 AM    RBC 3.19 11/25/2022 05:34 AM    HGB 9.6 11/25/2022 05:34 AM    HCT 30.3 11/25/2022 05:34 AM    MCV 95.1 11/25/2022 05:34 AM    MCH 30.1 11/25/2022 05:34 AM    MCHC 31.7 11/25/2022 05:34 AM    RDW 18.2 11/25/2022 05:34 AM     11/25/2022 05:34 AM          Radiology:    CT CHEST WO CONTRAST    Result Date: 11/22/2022  EXAMINATION: CT OF THE CHEST WITHOUT CONTRAST 11/22/2022 10:04 am TECHNIQUE: CT of the chest was performed without the administration of intravenous contrast. Multiplanar reformatted images are provided for review. Automated exposure control, iterative reconstruction, and/or weight based adjustment of the mA/kV was utilized to reduce the radiation dose to as low as reasonably achievable.  COMPARISON: 11/20/2022 chest radiograph, 10/19/2022 outside study HISTORY: ORDERING SYSTEM PROVIDED HISTORY: History of cavitary lesions/nodules-please compare with [facility]'s CT chest from September TECHNOLOGIST PROVIDED HISTORY: History of cavitary lesions/nodules-please compare with [facility]'s CT chest from September Reason for Exam: History of cavitary lesions/nodules-please compare with [facility]'s CT chest from September Additional signs and symptoms: Pt unable to answer questions or hold arms above head; pt vocalizing during exam FINDINGS: Chest Wall and Thoracic Inlet: No axillary or supraclavicular lymphadenopathy. Mediastinum and Jazmin: Scattered prominent mediastinal lymph nodes. A reference precarinal node measures 1.5 cm (series 2, image 49), not significantly changed. The ascending thoracic aorta is mildly dilated measuring 4.2 cm (series 2, image 66), unchanged. Mild-to-moderate atherosclerosis of the thoracic aorta. Cardiomegaly. Chronic severe coronary artery calcifications. Calcifications are seen along the left ventricular myocardium, which can be seen in the setting of prior infarct. No pericardial effusion. Lungs/Pleura: Mild-to-moderate paraseptal emphysema. Mild centrilobular emphysema. There are right middle and lower lobe nodular and consolidative opacities which have increased from prior, concerning for an infectious or inflammatory etiology. Significantly improved left upper lobe ground-glass opacities. The previously visualized left upper lobe cavitary nodule is no longer present. Small left pleural effusion with partial left lower lobe collapse. Upper Abdomen: Left upper pole renal cyst. Bones: No suspicious osseous findings. Degenerative changes of the spine. There is a 1.8 x 1.3 cm exophytic soft tissue lesion arising from the right upper chest wall. Correlate with direct inspection. 1. Significantly improved but not entirely resolved left upper lobe nodules with surrounding ground-glass attenuation, some of which previously demonstrated cavitation. 2.  There are slightly increased right middle and lower lobe nodular consolidative opacities, concerning for persistent infectious/inflammatory process. Follow-up to resolution should be considered.  3. Small left pleural effusion with partial collapse of the left lower lobe. 4.  Scattered prominent mediastinal lymph nodes are likely reactive. 5.  There is a 1.8 cm exophytic soft tissue lesion arising from the right upper chest wall. Correlate with direct inspection. 6.  Mild dilation of the ascending thoracic aorta measuring up to 4.2 cm. US RENAL LIMITED    Result Date: 11/19/2022  EXAMINATION: ULTRASOUND OF THE KIDNEYS 11/19/2022 6:22 am COMPARISON: None. HISTORY: ORDERING SYSTEM PROVIDED HISTORY: EDELMIRA TECHNOLOGIST PROVIDED HISTORY: EDELMIRA FINDINGS: The right kidney measures 10.9 cm in length and the left kidney measures 10.9 cm in length. Bilateral renal cysts measuring measuring up to 2.5 cm on the right and 3.1 cm on the left. Kidneys otherwise demonstrate normal cortical echogenicity. No hydronephrosis or intrarenal stones. No focal lesions. Benign bilateral renal cysts     XR CHEST PORTABLE    Result Date: 11/20/2022  EXAMINATION: ONE XRAY VIEW OF THE CHEST 11/20/2022 2:53 am COMPARISON: 11/19/2022 HISTORY: ORDERING SYSTEM PROVIDED HISTORY: ETT placement TECHNOLOGIST PROVIDED HISTORY: ETT placement Reason for Exam: ETT placement Additional signs and symptoms: ETT placement Relevant Medical/Surgical History: ETT placement FINDINGS: There is an ET tube with the tip 5.3 cm above the amparo. There is an NG tube overlying the left upper quadrant, however the most distal portion of the tube is not visualized. Cardiac size is enlarged. Stable bibasal infiltrates with underlying left basilar atelectasis. .  The pulmonary vascularity is stable. No pneumothorax. No significant pleural effusions identified . Loreatha Press      Stable chest.     XR CHEST PORTABLE    Result Date: 11/19/2022  EXAMINATION: ONE XRAY VIEW OF THE CHEST 11/19/2022 5:49 am COMPARISON: 11/18/2022 HISTORY: ORDERING SYSTEM PROVIDED HISTORY: ETT placement TECHNOLOGIST PROVIDED HISTORY: ETT placement Reason for Exam: ETT placement Additional signs and symptoms: ETT placement Relevant Medical/Surgical History: ETT placement FINDINGS: Endotracheal tube approximately 6 cm above the amparo. Enteric catheter courses into the abdomen. Heart size stable. Bibasilar pulmonary opacities. No pneumothorax. No new airspace disease. Persistent bibasilar pulmonary opacities could represent atelectasis or pneumonia     XR CHEST PORTABLE    Result Date: 11/18/2022  EXAMINATION: ONE XRAY VIEW OF THE CHEST 11/18/2022 6:00 pm COMPARISON: Radiograph performed earlier the same day HISTORY: ORDERING SYSTEM PROVIDED HISTORY: intubated TECHNOLOGIST PROVIDED HISTORY: intubated Reason for Exam: Intubation FINDINGS: Endotracheal tube terminates 7 cm above the amparo. Enteric tube is below the diaphragm. Cardiomediastinal silhouette is unchanged in size. Aortic atherosclerosis. No large pleural effusion or pneumothorax. There is patchy opacity in the right lung base. There is linear scarring/atelectasis in the left lung base. 1. Lines and tubes in expected position. 2. Bibasilar pulmonary opacities are again noted. XR CHEST PORTABLE    Result Date: 11/18/2022  EXAMINATION: ONE XRAY VIEW OF THE CHEST 11/18/2022 11:58 am COMPARISON: 09/27/2022 HISTORY: ORDERING SYSTEM PROVIDED HISTORY: sob TECHNOLOGIST PROVIDED HISTORY: sob Reason for Exam: sob FINDINGS: Stable cardiomegaly. Bibasilar pulmonary opacities noted. No pulmonary vascular congestion or edema. No pneumothorax.      Bibasilar pulmonary opacities suggesting pneumonia         Consultations:    Consults:     Final Specialist Recommendations/Findings:   IP CONSULT TO INTERNAL MEDICINE  IP CONSULT TO PULMONOLOGY  IP CONSULT TO NEPHROLOGY  IP CONSULT TO SOCIAL WORK  IP CONSULT TO CRITICAL CARE  IP CONSULT TO DIETITIAN  IP CONSULT TO GI  IP CONSULT TO DIETITIAN      The patient was seen and examined on day of discharge and this discharge summary is in conjunction with any daily progress note from day of discharge. Discharge plan:       Disposition:  To a non-[facility] facility    Physician Follow Up:     VERONICA Wei of [address] [facility] [address] [facility] [address]  [phone]        Provider Unknown, MD  Patient not available to ask          MD Kina Sales [address] [place] [address]  [address] [facility] [address]  [phone]    Schedule an appointment as soon as possible for a visit in 8 week(s)  colonoscopy    MD Kina Watts [address], [address] [facility] [address]  [phone]    Schedule an appointment as soon as possible for a visit in 1 week(s)      Red Lang MD  [facility] [address] [facility] [address]  [phone]    Schedule an appointment as soon as possible for a visit in 1 week(s)         Requiring Further Evaluation/Follow Up POST HOSPITALIZATION/Incidental Findings: None    Diet: renal diet    Activity: As tolerated    Instructions to Patient: Follow-up outpatient GI and nephrology    Discharge Medications:      Medication List        CONTINUE taking these medications      carvedilol 6.25 MG tablet  Commonly known as: COREG     furosemide 40 MG tablet  Commonly known as: LASIX     gabapentin 100 MG capsule  Commonly known as: NEURONTIN     insulin lispro 100 UNIT/ML Soln injection vial  Commonly known as: HUMALOG     magnesium oxide 400 MG tablet  Commonly known as: MAG-OX     polyethylene glycol 17 GM/SCOOP powder  Commonly known as: GLYCOLAX     senna-docusate 8.6-50 MG per tablet  Commonly known as: PERICOLACE     sertraline 50 MG tablet  Commonly known as: ZOLOFT     spironolactone 25 MG tablet  Commonly known as: ALDACTONE     warfarin 7.5 MG tablet  Commonly known as: COUMADIN            STOP taking these medications      bumetanide 1 MG tablet  Commonly known as: BUMEX     dronabinol 5 MG capsule  Commonly known as: MARINOL     Entresto 24-26 MG per tablet  Generic drug: sacubitril-valsartan     HYDROcodone-acetaminophen 5-325 MG per tablet  Commonly known as: Sandi3 Nhung Maciel Time Spent on discharge is  31 mins in patient examination, evaluation, counseling as well as medication reconciliation, prescriptions for required medications, discharge plan and follow up. Electronically signed by   William West MD  12/13/2022  4:22 PM      Thank you Dr. Reg Padilla MD for the opportunity to be involved in this patient's care. Attending Physician Statement  I have discussed the care of Scott Hollingsworth and I have examined the patient myselft and taken ros and hpi , including pertinent history and exam findings,  with the resident. I have reviewed the key elements of all parts of the encounter with the resident. I agree with the DC plan as documented by the resident.       Electronically signed by Kaleb Santamaria MD

## 2022-12-15 ENCOUNTER — HOSPITAL ENCOUNTER (OUTPATIENT)
Age: 67
Setting detail: SPECIMEN
Discharge: HOME | End: 2022-12-15

## 2022-12-15 LAB
INR BLD: 3.2
PROTHROMBIN TIME: 30.8 SEC (ref 9.1–12.3)

## 2022-12-15 PROCEDURE — 85610 PROTHROMBIN TIME: CPT

## 2022-12-15 PROCEDURE — P9603 ONE-WAY ALLOW PRORATED MILES: HCPCS

## 2022-12-15 PROCEDURE — 36415 COLL VENOUS BLD VENIPUNCTURE: CPT

## 2022-12-19 ENCOUNTER — HOSPITAL ENCOUNTER (OUTPATIENT)
Age: 67
Setting detail: SPECIMEN
Discharge: HOME | End: 2022-12-19

## 2022-12-19 LAB
INR BLD: 2.6
PROTHROMBIN TIME: 25.8 SEC (ref 9.1–12.3)

## 2022-12-19 PROCEDURE — 85610 PROTHROMBIN TIME: CPT

## 2022-12-19 PROCEDURE — P9603 ONE-WAY ALLOW PRORATED MILES: HCPCS

## 2022-12-19 PROCEDURE — 36415 COLL VENOUS BLD VENIPUNCTURE: CPT

## 2022-12-22 ENCOUNTER — HOSPITAL ENCOUNTER (OUTPATIENT)
Age: 67
Setting detail: SPECIMEN
Discharge: HOME | End: 2022-12-22

## 2022-12-22 LAB
INR BLD: 2.3
PROTHROMBIN TIME: 22.6 SEC (ref 9.1–12.3)

## 2022-12-22 PROCEDURE — 85610 PROTHROMBIN TIME: CPT

## 2022-12-22 PROCEDURE — P9603 ONE-WAY ALLOW PRORATED MILES: HCPCS

## 2022-12-22 PROCEDURE — 36415 COLL VENOUS BLD VENIPUNCTURE: CPT

## 2022-12-26 PROBLEM — E87.0 HYPERNATREMIA: Status: ACTIVE | Noted: 2022-01-01

## 2022-12-26 NOTE — ED PROVIDER NOTES
EMERGENCY DEPARTMENT ENCOUNTER    Pt Name: Radha Tamayo  MRN: [id number]  [place] 1955  Date of evaluation: 12/26/22  CHIEF COMPLAINT       Chief Complaint   Patient presents with    Other     Loss of gag reflex     HISTORY OF PRESENT ILLNESS   63-year-old male presents for reported shortness of breath. From nursing facility reported as having difficult time breathing nursing home reportedly tried to suction the patient are not successful and patient was sent for evaluation. Per nursing home staff patient is at his baseline mentation    The history is provided by the EMS personnel and the nursing home.          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 ([address]     Acquired absence of right leg below knee ([address]     Acute kidney failure (HCC)     Acute respiratory failure with hypoxia (HCC)     Cerebral infarction due to embolism (HCC)     Chronic kidney disease, unspecified     Chronic systolic (congestive) heart failure (HCC)     Depression     Heart failure (HCC)     Hypertension     Muscle weakness (generalized)     Other symbolic dysfunctions     Peripheral vascular disease (HCC)     Type 2 diabetes mellitus ([address]      Past Problem List  Patient Active Problem List   Diagnosis Code    Sepsis ([address] A41.9    Septic shock ([address] A41.9, R65.21    Acute respiratory failure with hypoxia and hypercapnia (HCC) J96.01, J96.02    Aspiration pneumonia, unspecified aspiration pneumonia type, unspecified laterality, unspecified part of lung ([address] J69.0    Coffee ground emesis K92.0    Rectal bleeding K62.5    Hypernatremia E87.0    Chronic combined systolic and diastolic congestive heart failure ([address] I50.42     SURGICAL HISTORY       Past Surgical History:   Procedure Laterality Date    LEG AMPUTATION BELOW KNEE Bilateral     UPPER GASTROINTESTINAL ENDOSCOPY N/A 11/23/2022 EGD ESOPHAGOGASTRODUODENOSCOPY performed by Susana Doan MD at [facility]       Current Discharge Medication List        CONTINUE these medications which have NOT CHANGED    Details   insulin glargine (BASAGLAR KWIKPEN) 100 UNIT/ML injection pen Inject 15 Units into the skin nightly      glucagon 1 MG injection Inject 1 kit into the muscle as needed (BG <60 and unresponsive)      ! ! warfarin (COUMADIN) 1 MG tablet Take 1 mg by mouth every evening Indications: With 6 mg tablet to = 7 mg total dose      carvedilol (COREG) 6.25 MG tablet 6.25 mg by Per G Tube route 2 times daily      furosemide (LASIX) 40 MG tablet 40 mg by Per G Tube route daily      magnesium oxide (MAG-OX) 400 MG tablet 400 mg by Per G Tube route 2 times daily      sertraline (ZOLOFT) 50 MG tablet 50 mg by Per G Tube route daily      !! warfarin (COUMADIN) 6 MG tablet 6 mg by Per G Tube route every evening Indications: with 1 mg tablet to = 7 mg total dose      spironolactone (ALDACTONE) 25 MG tablet 25 mg by Per G Tube route daily      polyethylene glycol (GLYCOLAX) 17 GM/SCOOP powder 17 g by Per G Tube route daily      HYDROcodone-acetaminophen (NORCO) 5-325 MG per tablet 1 tablet every 6 hours as needed for Pain. Indications: g-tube      gabapentin (NEURONTIN) 100 MG capsule 200 mg by Per G Tube route at bedtime. insulin lispro (HUMALOG) 100 UNIT/ML SOLN injection vial Inject into the skin 3 times daily (with meals) 351-400: 10 units, 301-350: 8 units, 351-300: 6 units, 201-250: 4 units, 151-200: 2 units      senna-docusate (PERICOLACE) 8.6-50 MG per tablet 1 tablet by Per G Tube route every 12 hours as needed for Constipation       !! - Potential duplicate medications found. Please discuss with provider. ALLERGIES     is allergic to penicillins. FAMILY HISTORY     has no family status information on file.       SOCIAL HISTORY       Social History     Tobacco Use    Smoking status: Unknown     PHYSICAL EXAM INITIAL VITALS: /68   Pulse 69   Temp 98.1 °F (36.7 °C) (Axillary)   Resp 22   Wt 159 lb (72.1 kg)   SpO2 97%   BMI 19.87 kg/m²    Physical Exam  Vitals and nursing note reviewed. Constitutional:       General: He is not in acute distress. Appearance: Normal appearance. He is ill-appearing. HENT:      Head: Normocephalic and atraumatic. Right Ear: External ear normal.      Left Ear: External ear normal.      Nose: Nose normal.      Mouth/Throat:      Mouth: Mucous membranes are moist.   Eyes:      Extraocular Movements: Extraocular movements intact. Pupils: Pupils are equal, round, and reactive to light. Cardiovascular:      Rate and Rhythm: Regular rhythm. Tachycardia present. Pulses: Normal pulses. Heart sounds: Normal heart sounds. Pulmonary:      Effort: Pulmonary effort is normal.      Breath sounds: Rhonchi present. Comments: Rhonchi Bilaterally  Abdominal:      General: Abdomen is flat. Palpations: Abdomen is soft. Tenderness: There is no abdominal tenderness. Musculoskeletal:         General: No tenderness. Normal range of motion. Cervical back: Neck supple. No spinous process tenderness or muscular tenderness. Right Lower Extremity: Right leg is amputated below knee. Left Lower Extremity: Left leg is amputated below knee. Skin:     General: Skin is warm and dry. Capillary Refill: Capillary refill takes less than 2 seconds. Neurological:      Comments: Moving upper extremities, opens eyes to voice, not following commands, nonverbal, reportedly baseline   Psychiatric:         Behavior: Behavior normal.         Thought Content: Thought content does not include homicidal or suicidal ideation. [employer]KIN-year-old male presents for complaints of shortness of breath.   On initial exam patient in no acute distress he is noted be tachycardic sats are stable on nasal cannula he is no significant rhonchi bilaterally, does have a history of aspiration, will check labs chest x-ray    Labs were reviewed patient was found to have creatinine of 3.03 up from his baseline, sodium was at 162, white count at 21, patient was started on broad-spectrum antibiotics including Flagyl for concern for possible aspiration x-ray was showing infiltrate in the right lower lobe, CT chest was obtained showing bibasilar infiltrates concerning for pneumonia    Discussed with nephrology Dr. Betty Lau, who recommends starting patient on D5 water for the hypernatremia and he will evaluate patient in the morning    Patient's blood pressure continued to drop, central line was placed order for Levophed was placed if maps continue to drop below 65, unable to give fluid bolus at this time as patient has an EF of 20%      Informed by nursing staff that patient sats have dropped while on the nasal cannula patient was evaluated he has no signs of respiratory distress however sats are in the 70s he was placed on BiPAP with improvement of his saturations    Will admit for further treatment    Spoke with [facility] NP Dr. Jeffrey Holder who accepts admission with nephrology consult. Patient demonstrates understanding and agreement with the plan, was given the opportunity to ask questions, and these questions were answered to the best of the provided information at this time. VS stable for transfer. This dictation was prepared using [employer] voice recognition software. CRITICAL CARE: 33 min      PROCEDURES:    Procedures    DIAGNOSTIC RESULTS   EKG:All EKG's are interpreted by the Emergency Department Physician who either signs or Co-signs this chart in the absence of a cardiologist.        RADIOLOGY:All plain film, CT, MRI, and formal ultrasound images (except ED bedside ultrasound) are read by the radiologist, see reports below, unless otherwisenoted in MDM or here.   XR CHEST PORTABLE   Final Result   Right IJ approach central venous catheter terminates in the superior vena   cava, with no pneumothorax. CT CHEST ABDOMEN PELVIS WO CONTRAST Additional Contrast? None   Final Result   Mild bibasal infiltrates, greater on the right and small right pleural   effusion suggesting pneumonia. Bilateral renal cysts, some which are hemorrhagic or containing proteinaceous   material      Mild superior endplate depression of L2 vertebral body which has developed   from the prior exam.      No acute intra-abdominal or intrapelvic abnormalities are noted. RECOMMENDATIONS:   MRI lumbar spine if clinically indicated         XR CHEST PORTABLE   Final Result   Residual but improving infiltrate in the right lower lobe. LABS: All lab results were reviewed by myself, and all abnormals are listed below.   Labs Reviewed   CBC WITH AUTO DIFFERENTIAL - Abnormal; Notable for the following components:       Result Value    WBC 21.8 (*)     Hemoglobin 13.3 (*)     RDW 17.7 (*)     Seg Neutrophils 72 (*)     Lymphocytes 2 (*)     Monocytes 8 (*)     Bands 18 (*)     Segs Absolute 15.70 (*)     Absolute Lymph # 0.44 (*)     Absolute Mono # 1.74 (*)     Absolute Bands # 3.92 (*)     All other components within normal limits   COMPREHENSIVE METABOLIC PANEL - Abnormal; Notable for the following components:    Glucose 140 (*)      (*)     Creatinine 3.03 (*)     Est, Glom Filt Rate 22 (*)     Calcium 12.7 (*)     Sodium 162 (*)     Chloride 109 (*)     CO2 43 (*)     Alkaline Phosphatase 144 (*)     Total Protein 8.5 (*)     All other components within normal limits   MAGNESIUM - Abnormal; Notable for the following components:    Magnesium 4.7 (*)     All other components within normal limits   PROTIME-INR - Abnormal; Notable for the following components:    Protime 46.4 (*)     INR 5.0 (*)     All other components within normal limits   TROPONIN - Abnormal; Notable for the following components:    Troponin, High Sensitivity 178 (*)     All other components within normal limits   PROCALCITONIN - Abnormal; Notable for the following components:    Procalcitonin 0.39 (*)     All other components within normal limits   URINALYSIS WITH REFLEX TO CULTURE - Abnormal; Notable for the following components:    Color, UA Dark Yellow (*)     Turbidity UA Turbid (*)     Glucose, Ur SMALL (*)     Bilirubin Urine NEGATIVE  Verified by ictotest. (*)     Ketones, Urine TRACE (*)     Urine Hgb LARGE (*)     Protein, UA 2+ (*)     Leukocyte Esterase, Urine SMALL (*)     All other components within normal limits   BLOOD GAS, ARTERIAL - Abnormal; Notable for the following components:    pCO2, Arterial 76.8 (*)     pO2, Arterial 61.5 (*)     HCO3, Arterial 45.2 (*)     Positive Base Excess, Art 20.1 (*)     O2 Sat, Arterial 89.0 (*)     All other components within normal limits   BASIC METABOLIC PANEL - Abnormal; Notable for the following components:    Glucose 459 (*)      (*)     Creatinine 3.29 (*)     Est, Glom Filt Rate 20 (*)     Calcium 11.6 (*)     Sodium 153 (*)     CO2 40 (*)     All other components within normal limits   SODIUM - Abnormal; Notable for the following components:    Sodium 147 (*)     All other components within normal limits   SODIUM - Abnormal; Notable for the following components:    Sodium 154 (*)     All other components within normal limits   MICROSCOPIC URINALYSIS - Abnormal; Notable for the following components:    Bacteria, UA MODERATE (*)     Amorphous, UA 3+ (*)     All other components within normal limits   BASIC METABOLIC PANEL - Abnormal; Notable for the following components:    Glucose 305 (*)      (*)     Creatinine 3.26 (*)     Est, Glom Filt Rate 20 (*)     Calcium 10.8 (*)     Sodium 151 (*)     CO2 37 (*)     All other components within normal limits   POC GLUCOSE FINGERSTICK - Abnormal; Notable for the following components:    POC Glucose 363 (*)     All other components within normal limits   POC GLUCOSE FINGERSTICK - Abnormal; Notable for the following components:    POC Glucose 168 (*)     All other components within normal limits   CULTURE, BLOOD 1   CULTURE, BLOOD 2   COVID-19 & INFLUENZA COMBO   MRSA DNA PROBE, NASAL   CULTURE, URINE   LIPASE   LACTATE, SEPSIS   LACTATE, SEPSIS   SPECIMEN REJECTION   BLOOD GAS, ARTERIAL   SODIUM   VANCOMYCIN LEVEL, RANDOM   SODIUM   SODIUM   POCT GLUCOSE   POCT GLUCOSE   POCT GLUCOSE   POCT GLUCOSE       EMERGENCY DEPARTMENTCOURSE:         Vitals:    Vitals:    12/27/22 0900 12/27/22 1000 12/27/22 1055 12/27/22 1100   BP: 116/77 117/69 109/68    Pulse: 86 85 69    Resp: 19 22 22    Temp:   98.1 °F (36.7 °C)    TempSrc:   Axillary    SpO2: 99% 98% 100% 97%   Weight:           The patient was given the following medications while in the emergency department:  Orders Placed This Encounter   Medications    DISCONTD: 0.9 % sodium chloride infusion    cefepime (MAXIPIME) 2,000 mg in sodium chloride 0.9 % 50 mL IVPB mini-bag     Order Specific Question:   Antimicrobial Indications     Answer:   Sepsis of Unknown Etiology    vancomycin (VANCOCIN) intermittent dosing (placeholder)     Order Specific Question:   Antimicrobial Indications     Answer:   Sepsis of Unknown Etiology     Order Specific Question:   Sepsis duration of therapy     Answer:   7 days    vancomycin (VANCOCIN) 1,750 mg in dextrose 5 % 500 mL IVPB     Order Specific Question:   Antimicrobial Indications     Answer:   Sepsis of Unknown Etiology     Order Specific Question:   Sepsis duration of therapy     Answer:   7 days    metronidazole (FLAGYL) 500 mg in 0.9% NaCl 100 mL IVPB premix     Order Specific Question:   Antimicrobial Indications     Answer:   Aspiration Pneumonia    dextrose 5 % 1,000 mL infusion    ipratropium-albuterol (DUONEB) nebulizer solution 1 ampule     Order Specific Question:   Initiate RT Bronchodilator Protocol     Answer:   Yes - ED protocol    ipratropium-albuterol (DUONEB) 0.5-2.5 (3) MG/3ML nebulizer solution     Chichi Gimenez: cabinet override    carvedilol (COREG) tablet 6.25 mg    furosemide (LASIX) tablet 40 mg    HYDROcodone-acetaminophen (NORCO) 5-325 MG per tablet 1 tablet    insulin glargine (LANTUS) injection vial 15 Units    glucose chewable tablet 16 g    OR Linked Order Group     dextrose bolus 10% 125 mL     dextrose bolus 10% 250 mL    glucagon (rDNA) injection 1 mg    dextrose 10 % infusion    insulin lispro (HUMALOG) injection vial 0-8 Units    insulin lispro (HUMALOG) injection vial 0-4 Units    polyethylene glycol (GLYCOLAX) packet 17 g    spironolactone (ALDACTONE) tablet 25 mg    sodium chloride flush 0.9 % injection 5-40 mL    sodium chloride flush 0.9 % injection 5-40 mL    0.9 % sodium chloride infusion    OR Linked Order Group     acetaminophen (TYLENOL) tablet 650 mg     acetaminophen (TYLENOL) suppository 650 mg    bisacodyl (DULCOLAX) suppository 10 mg    albuterol (PROVENTIL) nebulizer solution 2.5 mg     Order Specific Question:   Initiate RT Bronchodilator Protocol     Answer:   Yes - Inpatient Protocol    ipratropium-albuterol (DUONEB) nebulizer solution 1 ampule     Order Specific Question:   Initiate RT Bronchodilator Protocol     Answer:   Yes - Inpatient Protocol    DISCONTD: cefepime (MAXIPIME) 2,000 mg in sodium chloride 0.9 % 50 mL IVPB mini-bag     Order Specific Question:   Antimicrobial Indications     Answer:   Aspiration Pneumonia    metronidazole (FLAGYL) 500 mg in 0.9% NaCl 100 mL IVPB premix     Order Specific Question:   Antimicrobial Indications     Answer:   Aspiration Pneumonia    cefepime (MAXIPIME) 1,000 mg in sodium chloride 0.9 % 50 mL IVPB (mini-bag)     Order Specific Question:   Antimicrobial Indications     Answer:   Aspiration Pneumonia    DISCONTD: norepinephrine (LEVOPHED) 16 mg in dextrose 5 % 250 mL infusion     Order Specific Question:   Titrate Infusion? Answer:   Yes     Order Specific Question:   Initial Infusion Dose:      Answer:   5 mcg/min     Order Specific Question:   Goal of Therapy is: Answer:   MAP greater than 65 mmHg     Order Specific Question:   Contact Provider if:     Answer:   Patient is receiving the maximum dose and is not achieving the goal of therapy    0.9 % sodium chloride bolus    insulin glargine (LANTUS) injection vial 15 Units    insulin lispro (HUMALOG) injection vial 5 Units    sodium chloride 0.33 % 1,000 mL infusion    0.9 % sodium chloride bolus     CONSULTS:  PHARMACY TO DOSE VANCOMYCIN  IP CONSULT TO NEPHROLOGY  IP CONSULT TO HOSPICE  IP CONSULT TO DIETITIAN  PHARMACY TO DOSE WARFARIN  IP CONSULT TO SOCIAL WORK    FINAL IMPRESSION      1. Hypernatremia    2. Pneumonia due to infectious organism, unspecified laterality, unspecified part of lung    3. Acute on chronic respiratory failure with hypoxia and hypercapnia ([facility]          DISPOSITION/PLAN   DISPOSITION Admitted 12/27/2022 10:14:36 AM      PATIENT REFERRED TO:  No follow-up provider specified. DISCHARGE MEDICATIONS:  Current Discharge Medication List        The care is provided during an unprecedented national emergency due to the novel coronavirus, COVID 19.   [address], DO                     [address], DO  12/27/22 1941

## 2022-12-26 NOTE — Clinical Note
Discharge Plan[de-identified] Extended Care Facility (e.g. Adult Home, Nursing Home, etc.)   Telemetry/Cardiac Monitoring Required?: Yes   Bed request comments: updated to need regular ICU due to needing central line and pressors

## 2022-12-26 NOTE — ED NOTES
Call from Hayden Maldonado [place], updated on care this far. States he would like periodic updates on POC and condition.  POA lives in [place] and can not come to the hospital.      Emile Kidd RN  12/26/22 9944

## 2022-12-27 PROBLEM — I50.42 CHRONIC COMBINED SYSTOLIC AND DIASTOLIC CONGESTIVE HEART FAILURE (HCC): Status: ACTIVE | Noted: 2022-01-01

## 2022-12-27 NOTE — H&P
[address]      [address]     HISTORY AND PHYSICAL EXAMINATION            Date:   12/27/2022  Patient name:  Ting De Jesus  Date of admission:  12/26/2022  5:35 PM  MRN:   [id number]  Account:  [de-identified]  YOB: 1955  PCP:    Waylon Martin MD  Room:   [address] [place] [address]  Code Status:    DNR-CC    Chief Complaint:     Chief Complaint   Patient presents with    Other     Loss of gag reflex       History Obtained From:     reason patient could not give history:  altered mental status and lack of cooperation    History of Present Illness: The patient is a 79 y.o. Non- / non  male who presents withOther (Loss of gag reflex)    Patient presented to the ED from nursing home after started having shortness of breath and altered mental status mainly observed by decreased gag reflex. At the ED patient somnolent with altered mental status, not responsive to commands. Patient had tachycardia with O2 sat dropped 78% at room air, patient was placed on BiPAP,  labs were significant for hypernatremia 162, creatinine of 3.03, .  UA positive for UTI, chest x-ray suggestive of pneumonia, sepsis work-up was done and patient was started on IV cefepime, Zyvox, Flagyl. Patient had a history of EDELMIRA in October 2022, patient needed dialysis improved at that time and was discharged. Nephrology was consulted in the ED, decision was that patient needs dialysis, Dr. Hugh Jones spoke to [place] of the patient (brother), brother did not want to pursue dialysis and changed CODE STATUS of patient to comfort care. Patient was admitted for inpatient hospice and palliative care management.         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 ([address]     Acquired absence of right leg below knee (HCC)     Acute kidney failure (HCC)     Acute respiratory failure with hypoxia (HCC)     Cerebral infarction due to embolism (HCC)     Chronic kidney disease, unspecified     Chronic systolic (congestive) heart failure (HCC)     Depression     Heart failure (HCC)     Hypertension     Muscle weakness (generalized)     Other symbolic dysfunctions     Peripheral vascular disease (HCC)     Type 2 diabetes mellitus (HCC)         Past SurgicalHistory:     Past Surgical History:   Procedure Laterality Date    LEG AMPUTATION BELOW KNEE Bilateral     UPPER GASTROINTESTINAL ENDOSCOPY N/A 11/23/2022    EGD ESOPHAGOGASTRODUODENOSCOPY performed by Brayan Isaac MD at [facility]        Medications Prior to Admission:        Prior to Admission medications    Medication Sig Start Date End Date Taking? Authorizing Provider   insulin glargine (BASAGLAR KWIKPEN) 100 UNIT/ML injection pen Inject 15 Units into the skin nightly   Yes Historical Provider, MD   glucagon 1 MG injection Inject 1 kit into the muscle as needed (BG <60 and unresponsive)   Yes Historical Provider, MD   warfarin (COUMADIN) 1 MG tablet Take 1 mg by mouth every evening Indications:  With 6 mg tablet to = 7 mg total dose   Yes Historical Provider, MD   carvedilol (COREG) 6.25 MG tablet 6.25 mg by Per G Tube route 2 times daily   Yes Historical Provider, MD   furosemide (LASIX) 40 MG tablet 40 mg by Per G Tube route daily   Yes Historical Provider, MD   magnesium oxide (MAG-OX) 400 MG tablet 400 mg by Per G Tube route 2 times daily   Yes Historical Provider, MD   sertraline (ZOLOFT) 50 MG tablet 50 mg by Per G Tube route daily   Yes Historical Provider, MD   warfarin (COUMADIN) 6 MG tablet 6 mg by Per G Tube route every evening Indications: with 1 mg tablet to = 7 mg total dose   Yes Historical Provider, MD   spironolactone (ALDACTONE) 25 MG tablet 25 mg by Per G Tube route daily   Yes Historical Provider, MD   polyethylene glycol (GLYCOLAX) 17 GM/SCOOP powder 17 g by Per G Tube route daily   Yes Historical Provider, MD   HYDROcodone-acetaminophen (NORCO) 5-325 MG per tablet 1 tablet every 6 hours as needed for Pain. Indications: g-tube    Historical Provider, MD   gabapentin (NEURONTIN) 100 MG capsule 200 mg by Per G Tube route at bedtime. 22  Historical Provider, MD   insulin lispro (HUMALOG) 100 UNIT/ML SOLN injection vial Inject into the skin 3 times daily (with meals) 351-400: 10 units, 301-350: 8 units, 351-300: 6 units, 201-250: 4 units, 151-200: 2 units    Historical Provider, MD   senna-docusate (Lidiabindu Irizarry) 8.6-50 MG per tablet 1 tablet by Per G Tube route every 12 hours as needed for Constipation    Historical Provider, MD        Allergies:     Penicillins    Social History:     Tobacco:    has no history on file for tobacco use. Alcohol:      has no history on file for alcohol use. Drug Use:  has no history on file for drug use. Family History:     No family history on file. Review of Systems:     Positive and Negative as described in HPI. Review of Systems   Constitutional:         Limited due to patient's altered mental status     Physical Exam:   /68   Pulse 69   Temp 98.1 °F (36.7 °C) (Axillary)   Resp 22   Wt 159 lb (72.1 kg)   SpO2 97%   BMI 19.87 kg/m²   Temp (24hrs), Av.3 °F (36.8 °C), Min:97.7 °F (36.5 °C), Max:99.1 °F (37.3 °C)    Recent Labs     22  0057 22  1115   POCGLU 363* 168*       Intake/Output Summary (Last 24 hours) at 2022 1614  Last data filed at 2022 0422  Gross per 24 hour   Intake --   Output 50 ml   Net -50 ml       Physical Exam  Cardiovascular:      Rate and Rhythm: Normal rate and regular rhythm. Heart sounds: Normal heart sounds. Pulmonary:      Breath sounds: Normal breath sounds. Abdominal:      Tenderness: There is abdominal tenderness. There is guarding. Musculoskeletal:      Comments: Bilateral lower limbs amputations   Skin:     General: Skin is warm.       Capillary Refill: Capillary refill takes less than 2 seconds. Coloration: Skin is pale. Findings: Bruising present.        Investigations:     Laboratory Testing:  Recent Results (from the past 24 hour(s))   CBC with Auto Differential    Collection Time: 12/26/22  6:00 PM   Result Value Ref Range    WBC 21.8 (H) 3.5 - 11.0 k/uL    RBC 4.53 4.5 - 5.9 m/uL    Hemoglobin 13.3 (L) 13.5 - 17.5 g/dL    Hematocrit 42.3 41 - 53 %    MCV 93.4 80 - 100 fL    MCH 29.4 26 - 34 pg    MCHC 31.5 31 - 37 g/dL    RDW 17.7 (H) 11.5 - 14.9 %    Platelets 634 017 - 059 k/uL    MPV 9.8 6.0 - 12.0 fL    Seg Neutrophils 72 (H) 36 - 66 %    Lymphocytes 2 (L) 24 - 44 %    Monocytes 8 (H) 1 - 7 %    Eosinophils % 0 0 - 4 %    Basophils 0 0 - 2 %    Bands 18 (H) 0 - 10 %    Segs Absolute 15.70 (H) 1.3 - 9.1 k/uL    Absolute Lymph # 0.44 (L) 1.0 - 4.8 k/uL    Absolute Mono # 1.74 (H) 0.1 - 1.3 k/uL    Absolute Eos # 0.00 0.0 - 0.4 k/uL    Basophils Absolute 0.00 0.0 - 0.2 k/uL    Absolute Bands # 3.92 (H) 0.0 - 1.0 k/uL    Morphology ANISOCYTOSIS PRESENT     Morphology FEW STOMATOCYTES    CMP    Collection Time: 12/26/22  6:00 PM   Result Value Ref Range    Glucose 140 (H) 70 - 99 mg/dL     (HH) 8 - 23 mg/dL    Creatinine 3.03 (H) 0.70 - 1.20 mg/dL    Est, Glom Filt Rate 22 (L) >60 mL/min/1.73m2    Calcium 12.7 (H) 8.6 - 10.4 mg/dL    Sodium 162 (HH) 135 - 144 mmol/L    Potassium 4.1 3.7 - 5.3 mmol/L    Chloride 109 (H) 98 - 107 mmol/L    CO2 43 (HH) 20 - 31 mmol/L    Anion Gap 10 9 - 17 mmol/L    Alkaline Phosphatase 144 (H) 40 - 129 U/L    ALT 38 5 - 41 U/L    AST 27 <40 U/L    Total Bilirubin 0.4 0.3 - 1.2 mg/dL    Total Protein 8.5 (H) 6.4 - 8.3 g/dL    Albumin 3.8 3.5 - 5.2 g/dL   Lipase    Collection Time: 12/26/22  6:00 PM   Result Value Ref Range    Lipase 24 13 - 60 U/L   Magnesium    Collection Time: 12/26/22  6:00 PM   Result Value Ref Range    Magnesium 4.7 (H) 1.6 - 2.6 mg/dL   Protime-INR    Collection Time: 12/26/22  6:00 PM Result Value Ref Range    Protime 46.4 (H) 11.8 - 14.6 sec    INR 5.0 (HH)    Troponin    Collection Time: 12/26/22  6:00 PM   Result Value Ref Range    Troponin, High Sensitivity 178 (HH) 0 - 22 ng/L   Lactate, Sepsis    Collection Time: 12/26/22  6:00 PM   Result Value Ref Range    Lactic Acid, Sepsis 1.4 0.5 - 1.9 mmol/L   Procalcitonin    Collection Time: 12/26/22  6:00 PM   Result Value Ref Range    Procalcitonin 0.39 (H) <0.09 ng/mL   COVID-19 & Influenza Combo    Collection Time: 12/26/22  6:08 PM    Specimen: Nasopharyngeal Swab   Result Value Ref Range    Specimen Description . NASOPHARYNGEAL SWAB     Source . NASOPHARYNGEAL SWAB     SARS-CoV-2 RNA, RT PCR Not Detected Not Detected    INFLUENZA A Not Detected Not Detected    INFLUENZA B Not Detected Not Detected   Blood Culture 1    Collection Time: 12/26/22  7:00 PM    Specimen: Blood   Result Value Ref Range    Specimen Description . BLOOD     Special Requests R AC     Culture NO GROWTH 12 HOURS    Lactate, Sepsis    Collection Time: 12/26/22  7:37 PM   Result Value Ref Range    Lactic Acid, Sepsis 1.4 0.5 - 1.9 mmol/L   EKG 12 Lead    Collection Time: 12/26/22  8:45 PM   Result Value Ref Range    Ventricular Rate 109 BPM    Atrial Rate 109 BPM    P-R Interval 158 ms    QRS Duration 88 ms    Q-T Interval 326 ms    QTc Calculation (Bazett) 439 ms    P Axis 46 degrees    R Axis -52 degrees    T Axis 61 degrees   Arterial Blood Gases    Collection Time: 12/26/22  9:04 PM   Result Value Ref Range    pH, Arterial 7.378 7.350 - 7.450    pCO2, Arterial 76.8 (HH) 35.0 - 45.0 mmHg    pO2, Arterial 61.5 (L) 80.0 - 100.0 mmHg    HCO3, Arterial 45.2 (H) 22.0 - 26.0 mmol/L    Positive Base Excess, Art 20.1 (H) 0.0 - 2.0 mmol/L    O2 Sat, Arterial 89.0 (L) 95 - 98 %    Carboxyhemoglobin 1.0 0 - 5 %    Methemoglobin 0.6 0.0 - 1.9 %    Pt Temp 37.0     O2 Device/Flow/% BIPAP     Respiratory Rate 25     Hussain Test PASS     Sample Site Right Brachial Artery     Pt.  Position SEMI-FOWLERS     Mode 12/8     FIO2 100%     Text for Respiratory RESULTS GIVEN TO MD    Culture, Blood 2    Collection Time: 12/26/22  9:52 PM    Specimen: Blood   Result Value Ref Range    Specimen Description . BLOOD RIGHT HAND     Culture NO GROWTH 12 HOURS    Basic Metabolic Panel    Collection Time: 12/27/22 12:07 AM   Result Value Ref Range    Glucose 459 (HH) 70 - 99 mg/dL     (HH) 8 - 23 mg/dL    Creatinine 3.29 (H) 0.70 - 1.20 mg/dL    Est, Glom Filt Rate 20 (L) >60 mL/min/1.73m2    Calcium 11.6 (H) 8.6 - 10.4 mg/dL    Sodium 153 (H) 135 - 144 mmol/L    Potassium 4.5 3.7 - 5.3 mmol/L    Chloride 101 98 - 107 mmol/L    CO2 40 (H) 20 - 31 mmol/L    Anion Gap 12 9 - 17 mmol/L   POC Glucose Fingerstick    Collection Time: 12/27/22 12:57 AM   Result Value Ref Range    POC Glucose 363 (H) 75 - 110 mg/dL   Urinalysis with Reflex to Culture    Collection Time: 12/27/22  4:24 AM    Specimen: Urine   Result Value Ref Range    Color, UA Dark Yellow (A) Yellow    Turbidity UA Turbid (A) Clear    Glucose, Ur SMALL (A) NEGATIVE    Bilirubin Urine NEGATIVE  Verified by ictotest. (A) NEGATIVE    Ketones, Urine TRACE (A) NEGATIVE    Specific [place], UA 1.021 1.000 - 1.030    Urine Hgb LARGE (A) NEGATIVE    pH, UA 5.0 5.0 - 8.0    Protein, UA 2+ (A) NEGATIVE    Urobilinogen, Urine Normal Normal    Nitrite, Urine NEGATIVE NEGATIVE    Leukocyte Esterase, Urine SMALL (A) NEGATIVE   Microscopic Urinalysis    Collection Time: 12/27/22  4:24 AM   Result Value Ref Range    WBC, UA 10 TO 20 /HPF    RBC, UA TOO NUMEROUS TO COUNT /HPF    Epithelial Cells UA 0 TO 2 /HPF    Bacteria, UA MODERATE (A) None    Amorphous, UA 3+ (A) None   Sodium    Collection Time: 12/27/22  4:42 AM   Result Value Ref Range    Sodium 154 (H) 135 - 144 mmol/L   SPECIMEN REJECTION    Collection Time: 12/27/22  7:00 AM   Result Value Ref Range    Specimen Source BLOOD     Ordered Test bmp     Reason for Rejection Unable to perform testing: Specimen hemolyzed. Basic Metabolic Panel    Collection Time: 12/27/22  7:12 AM   Result Value Ref Range    Glucose 305 (H) 70 - 99 mg/dL     (HH) 8 - 23 mg/dL    Creatinine 3.26 (H) 0.70 - 1.20 mg/dL    Est, Glom Filt Rate 20 (L) >60 mL/min/1.73m2    Calcium 10.8 (H) 8.6 - 10.4 mg/dL    Sodium 151 (H) 135 - 144 mmol/L    Potassium 4.2 3.7 - 5.3 mmol/L    Chloride 104 98 - 107 mmol/L    CO2 37 (H) 20 - 31 mmol/L    Anion Gap 10 9 - 17 mmol/L   POC Glucose Fingerstick    Collection Time: 12/27/22 11:15 AM   Result Value Ref Range    POC Glucose 168 (H) 75 - 110 mg/dL   Sodium    Collection Time: 12/27/22 12:13 PM   Result Value Ref Range    Sodium 147 (H) 135 - 144 mmol/L       Imaging/Diagnostics:  XR CHEST PORTABLE    Result Date: 12/27/2022  EXAMINATION: ONE XRAY VIEW OF THE CHEST 12/26/2022 11:00 pm COMPARISON: 12/26/2022 HISTORY: ORDERING SYSTEM PROVIDED HISTORY: Line placement TECHNOLOGIST PROVIDED HISTORY: Line placement Reason for Exam: Line placement FINDINGS: There is a right IJ central venous approach catheter with terminating in the superior vena cava. Cardiac and mediastinal silhouettes appear similar. Right infrahilar airspace disease is again identified. No new infiltrate is seen. Degenerative changes are seen in the shoulders and spine. No acute osseous abnormality is identified. Right IJ approach central venous catheter terminates in the superior vena cava, with no pneumothorax. XR CHEST PORTABLE    Result Date: 12/26/2022  EXAMINATION: ONE XRAY VIEW OF THE CHEST 12/26/2022 5:42 pm COMPARISON: November 28, 2022 HISTORY: ORDERING SYSTEM PROVIDED HISTORY: sob TECHNOLOGIST PROVIDED HISTORY: sob Reason for Exam: SOB FINDINGS: Redemonstration of cardiomegaly. The previously seen right lower lobe infiltrate demonstrates improvement with some residual infiltrate. Left lung and bilateral pleural spaces appear unremarkable. Residual but improving infiltrate in the right lower lobe.      CT CHEST ABDOMEN PELVIS WO CONTRAST Additional Contrast? None    Result Date: 12/26/2022  EXAMINATION: CT OF THE CHEST, ABDOMEN, AND PELVIS WITHOUT CONTRAST 12/26/2022 3:48 pm TECHNIQUE: CT of the chest, abdomen and pelvis was performed without the administration of intravenous contrast. Multiplanar reformatted images are provided for review. Automated exposure control, iterative reconstruction, and/or weight based adjustment of the mA/kV was utilized to reduce the radiation dose to as low as reasonably achievable. COMPARISON: 09/27/2022 HISTORY: ORDERING SYSTEM PROVIDED HISTORY: possible aspiration, [place], eval for uropathy TECHNOLOGIST PROVIDED HISTORY: possible aspiration, [place], eval for uropathy Decision Support Exception - unselect if not a suspected or confirmed emergency medical condition->Emergency Medical Condition (MA) Reason for Exam: possible aspiration, [place], eval for uropathy FINDINGS: Patient motion artifact degrading image resolution Chest: Mediastinum: There are a few small nonspecific lymph nodes seen in the middle mediastinum. No suspicious lymphadenopathy. Lungs/pleura: Mild bibasal infiltrates. Small right pleural effusion. No pulmonary masses are noted. Cardiomediastinal Structures: Coronary arterial calcifications are seen. Intimal calcifications associated with the thoracic aorta. Cardiac chambers are mildly enlarged Soft Tissues/Bones: There are hypertrophic degenerative changes in the thoracic spine. No acute osseous abnormalities. FINDINGS:. Organs: Liver is normal in size and density. No focal masses identified. No evidence of intrahepatic ductal dilatation. Spleen is normal size. The gallbladder is unremarkable. Both adrenal glands are normal.  Pancreas is atrophic in appearance. Bilateral renal cysts, of some of which are hyperdense. The kidneys are otherwise normal in size and attenuation without evidence of hydronephrosis or renal calculi. GI/Bowel:  The visualized bowel and mesentery show no mass lesions. Mild colonic diverticulosis. No evidence of diverticulitis. Gastrostomy tube in the stomach. Normal appendix Pelvis: No intrapelvic mass is identified. Bladder contains a catheter and is decompressed. Rectum is intact. Peritoneum/Retroperitoneum: No free fluid. No lymphadenopathy. No evidence of pneumoperitoneum. Bones/Soft Tissues: Bilateral fat containing inguinal hernias. Vascular calcifications are seen compatible with atherosclerotic disease. Degenerative changes seen in the visualized spine. Mild superior endplate depression of L2 vertebral body which has developed from the prior exam.     Mild bibasal infiltrates, greater on the right and small right pleural effusion suggesting pneumonia. Bilateral renal cysts, some which are hemorrhagic or containing proteinaceous material Mild superior endplate depression of L2 vertebral body which has developed from the prior exam. No acute intra-abdominal or intrapelvic abnormalities are noted.  RECOMMENDATIONS: MRI lumbar spine if clinically indicated       Assessment :      Primary Problem  Hypernatremia    Active Hospital Problems    Diagnosis Date Noted    Chronic combined systolic and diastolic congestive heart failure ([facility] [I50.42] 12/27/2022     Priority: Medium    Hypernatremia [E87.0] 12/26/2022     Priority: Medium    Acute respiratory failure with hypoxia and hypercapnia (HCC) [J96.01, J96.02] 11/18/2022     Priority: Medium    Aspiration pneumonia, unspecified aspiration pneumonia type, unspecified laterality, unspecified part of lung ([address] [J69.0] 11/18/2022     Priority: Medium    Septic shock ([address] [A41.9, R65.21] 09/27/2022     Priority: Medium       Plan:     Patient status Admit as inpatient in the  Med/Surge    EDELMIRA on CKD, with metabolic encephalopathy  - likely due to  ATN   -Creatinine 3.26, baseline 1.8-2  -BUN of 170  -Central IV access was done at the ED based on emergency last night  -Previous history of dialysis sessions with improved urinary function  -Requires dialysis, and brother who is POA declined to proceed with dialysis  -Code was changed to [facility]  - Hospice care and palliative care consulted    Hypernatremia   -Likely due to dehydration and renal failure  - 162> 147    Septic shock likely due to UTI vs pneumonia  -UA positive for UTI  -Patient is tachycardic, O2 sat dropped to 78%  -Patient is on BiPAP  -Hypotensive on IV fluids  -On IV cefepime, Flagyl and Zyvox  -Medications can be discontinued according to hospice care and palliative care plan    CHF with reduced EF of 10 to 15%    DM type II  -Controlled, last Hemoglobin A1c 5.7 on 11/22  -Elevated blood glucose levels   -Continue insulin    History of stroke  -On warfarin   - Consulted pharmacy to dose    Bilateral below-knee amputation    Consultations:   PHARMACY TO DOSE VANCOMYCIN  IP CONSULT TO NEPHROLOGY  IP CONSULT TO HOSPICE  IP CONSULT TO DIETITIAN  PHARMACY TO DOSE WARFARIN  IP CONSULT TO SOCIAL WORK  IP CONSULT TO [address]     Patient is admitted as inpatient status because of co-morbiditieslisted above, severity of signs and symptoms as outlined, requirement for current medical therapies and most importantly because of direct risk to patient if care not provided in a hospital setting. Gladys Tolentino MD  12/27/2022  4:14 PM    Attestation and add on       I have discussed the care of Michelle De Leon , including pertinent history and exam findings,      12/27/22    with the resident. I have seen and examined the patient and the key elements of all parts of the encounter have been performed by me . I agree with the assessment, plan and orders as documented by the resident. Blade Estrada MD      [address], [address] [place] [address].    Phone [phone]   Fax: [phone]  Answering Service: [phone]            Copy sent to Dr. Raisa Fall MD

## 2022-12-27 NOTE — DEATH NOTES
Death Pronouncement Note  Patient's Name: Dillon Arellano   Patient's YOB: 1955  MRN Number: [id number]    Admitting Provider: Diannia Dandy, MD  Attending Provider: Diannia Dandy, MD    Patient was examined and the following were absent: Pulses, Blood Pressure, and Respiratory effort. Asystole was noted on cardiac monitor.      I declared the patient dead on 12/27/2022 at 5:09 PM    Preliminary Cause of Death: Cardiopulmonary arrest [facility])     Electronically signed by Get Acosta MD on 12/27/22 at 5:20 PM EST Arava Counseling:  Patient counseled regarding adverse effects of Arava including but not limited to nausea, vomiting, abnormalities in liver function tests. Patients may develop mouth sores, rash, diarrhea, and abnormalities in blood counts. The patient understands that monitoring is required including LFTs and blood counts.  There is a rare possibility of scarring of the liver and lung problems that can occur when taking methotrexate. Persistent nausea, loss of appetite, pale stools, dark urine, cough, and shortness of breath should be reported immediately. Patient advised to discontinue Arava treatment and consult with a physician prior to attempting conception. The patient will have to undergo a treatment to eliminate Arava from the body prior to conception.

## 2022-12-27 NOTE — CONSULTS
NEPHROLOGY CONSULT     Patient :  Tejinder Gil; 79 y.o. MRN# [id number]  Location:  06/06  Attending:  Derick Almendarez MD  Admit Date:  12/26/2022   Hospital Day: 1      Reason for Consult: Acute kidney injury      Chief Complaint: Altered mental status  History Obtained From: EMR, nursing staff, brother    History of Present Illness: This is a 79 y.o. male with past medical history of type 2 diabetes insulin-dependent diabetes mellitus, history of CVA history of autism, history of CHF with reduced EF of 10 to 15% history of bilateral BKA, history of recent acute renal failure requiring dialysis in October 2022 till November 2022, patient partially regained kidney function dialysis was stopped and was discharged to SNF. Patient presented to the hospital from nursing facility with shortness of breath altered mental status. In the ER evaluation patient was noted to be lethargic decrease in mentation. Labs revealed acute kidney injury on CKD with serum creatinine of 3.0 mg/dL and BUN of 170, serum sodium of 162  Patient was started on BiPAP, and D5 water patient also received saline bolus Harris catheter was placed. Patient became hypotensive and was started on Levophed. No significant urine output patient remains oliguric to   Anuric.   On my evaluation patient responds to painful stimuli only, is not opening his eyes, on BiPAP      Past Medical History:        Diagnosis Date    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 ([address]     Acquired absence of right leg below knee ([address]     Acute kidney failure ([address]     Acute respiratory failure with hypoxia ([address]     Cerebral infarction due to embolism (HCC)     Chronic kidney disease, unspecified     Chronic systolic (congestive) heart failure (HCC)     Depression     Heart failure (HCC)     Hypertension     Muscle weakness (generalized)     Other symbolic dysfunctions     Peripheral vascular disease ([address]     Type 2 diabetes mellitus ([address]        Past Surgical History: Procedure Laterality Date    LEG AMPUTATION BELOW KNEE Bilateral     UPPER GASTROINTESTINAL ENDOSCOPY N/A 11/23/2022    EGD ESOPHAGOGASTRODUODENOSCOPY performed by Xochitl Cuevas MD at [facility]       Current Medications:    insulin glargine (LANTUS) injection vial 15 Units, Nightly  glucagon (rDNA) injection 1 mg, PRN  insulin lispro (HUMALOG) injection vial 0-8 Units, TID WC  insulin lispro (HUMALOG) injection vial 0-4 Units, Nightly  sodium chloride 0.33 % 1,000 mL infusion, Continuous  0.9 % sodium chloride bolus, Once  vancomycin (VANCOCIN) intermittent dosing (placeholder), RX Placeholder  dextrose 5 % 1,000 mL infusion, Continuous  ipratropium-albuterol (DUONEB) nebulizer solution 1 ampule, Q4H PRN  metronidazole (FLAGYL) 500 mg in 0.9% NaCl 100 mL IVPB premix, Q8H  cefepime (MAXIPIME) 1,000 mg in sodium chloride 0.9 % 50 mL IVPB (mini-bag), Q12H  norepinephrine (LEVOPHED) 16 mg in dextrose 5 % 250 mL infusion, Continuous        Allergies:  Penicillins    Social History:   Social History     Socioeconomic History    Marital status: Single     Spouse name: Not on file    Number of children: Not on file    Years of education: Not on file    Highest education level: Not on file   Occupational History    Not on file   Tobacco Use    Smoking status: Unknown    Smokeless tobacco: Not on file   Substance and Sexual Activity    Alcohol use: Not on file    Drug use: Not on file    Sexual activity: Not on file   Other Topics Concern    Not on file   Social History Narrative    Not on file     Social Determinants of Health     Financial Resource Strain: Not on file   Food Insecurity: Not on file   Transportation Needs: Not on file   Physical Activity: Not on file   Stress: Not on file   Social Connections: Not on file   Intimate Partner Violence: Not on file   Housing Stability: Not on file       Family History:   No family history on file.     Review of Systems:    Unobtainable    Objective:  CURRENT TEMPERATURE: Temp: 99.1 °F (37.3 °C)  MAXIMUM TEMPERATURE OVER 24HRS:  Temp (24hrs), Av.4 °F (36.9 °C), Min:97.7 °F (36.5 °C), Max:99.1 °F (37.3 °C)    CURRENT RESPIRATORY RATE:  Resp: 20  CURRENT PULSE:  Heart Rate: 83  CURRENT BLOOD PRESSURE:  BP: 137/86  24HR BLOOD PRESSURE RANGE:  Systolic (34XCT), UCD:93 , Min:68 , IQZ:573   ; Diastolic (70DSV), NDQ:01, Min:52, Max:115    24HR INTAKE/OUTPUT:    Intake/Output Summary (Last 24 hours) at 2022 0911  Last data filed at 2022 0422  Gross per 24 hour   Intake --   Output 50 ml   Net -50 ml     Patient Vitals for the past 96 hrs (Last 3 readings):   Weight   22 1900 159 lb (72.1 kg)       Physical Exam:  GENERAL APPEARANCE: Unresponsive, responds to painful stimuli grimaces, on BiPAP  HEAD: normocephalic  EYES: Patient is not opening his eyes  NOSE:  No nasal discharge. THROAT: Endotracheal tube noted  CARDIAC: Normal S1 and S2. No S3, S4 or murmurs. Rhythm is regular. LUNGS: Clear to auscultation, bilateral air entry intubated on ventilator l  Neck-trachea midline nontender symmetrical    MUSKULOSKELETAL: Adequately aligned spine. No joint erythema or tenderness. EXTREMITIES: No edema. Bilateral BKA  NEURO: Moving extremities, intubated on ventilator      Labs:   CBC:  Recent Labs     22  1800   WBC 21.8*   RBC 4.53   HGB 13.3*   HCT 42.3   MCV 93.4   MCH 29.4   MCHC 31.5   RDW 17.7*      MPV 9.8      BMP:   Recent Labs     22  1800 22  0007 22  0442 22  0712   * 153* 154* 151*   K 4.1 4.5  --  4.2   * 101  --  104   CO2 43* 40*  --  37*   * 176*  --  183*   CREATININE 3.03* 3.29*  --  3.26*   GLUCOSE 140* 459*  --  305*   CALCIUM 12.7* 11.6*  --  10.8*      Phosphorus:  No results for input(s): PHOS in the last 72 hours.   Magnesium:   Recent Labs     22  1800   MG 4.7*     Albumin:   Recent Labs     22  1800   LABALBU 3.8       IRON:  No results for input(s): IRON in the last 72 hours.  Iron Saturation:  Invalid input(s): PERCENTFE  TIBC:  No results for input(s): TIBC in the last 72 hours. FERRITIN:  No results for input(s): FERRITIN in the last 72 hours. SPEP: No results for input(s): SPEP in the last 72 hours. Recent Labs     12/26/22  1800   PROT 8.5*     UPEP: No results for input(s): TPU in the last 72 hours. Urine Sodium:  No results for input(s): VÍCTOR in the last 72 hours. Urine Potassium: No results for input(s): KUR in the last 72 hours. Urine Chloride:  No results for input(s): CLU in the last 72 hours. Urine Ph:  Invalid input(s): PO4U  Urine Osmolarity: No results for input(s): OSMOU in the last 72 hours. Urine Creatinine:  No results for input(s): LABCREA in the last 72 hours. Urine Eosinophils: Invalid input(s): EOSU  Urine Protein:  No results for input(s): TPU in the last 72 hours. Urinalysis:    Recent Labs     12/27/22  0424   NITRU NEGATIVE   COLORU Dark Yellow*   PHUR 5.0   WBCUA 10 TO 20   RBCUA TOO NUMEROUS TO COUNT   BACTERIA MODERATE*   SPECGRAV 1.021   LEUKOCYTESUR SMALL*   UROBILINOGEN Normal   BILIRUBINUR NEGATIVE  Verified by ictotest.*   [address] 3+*         Radiology:  Reviewed as available.     Assessment:   EDELMIRA on CKD, most likely ATN patient is oliguric to an uric serum creatinine 3.0 mg/dL BUN of 172, patient has uremic and metabolic encephalopathy  Hyponatremia with serum sodium of 162 most likely secondary to dehydration and renal failure poor oral intake of water  Metabolic alkalosis  Type 2 diabetes insulin-dependent diabetes mellitus hyperglycemia  Acute respiratory failure on BiPAP  Hypotension on Levophed  Hypercalcemia most likely secondary to dehydration       Plan:  Continue 0.33% saline at 150 MLS per hour, normal saline bolus as needed  Discussed with brother Anne Mcclure who is also power of  and I called him he is in [place] I discussed with him regarding his kidney failure and possible complications of renal failure and also discussed the management plan. His brother does not want to pursue dialysis because of patient's quality of life. He also stated that he wants him to be comfortable and he would like to talk to hospice as well. Consult hospice. Poor prognosis  Condition-critical      Thank you for the consultation.       Electronically signed by Suzi Stein MD on 12/27/2022 at 9:11 AM

## 2022-12-27 NOTE — PROGRESS NOTES
Vancomycin Dosing by Pharmacy - Initial Note   TODAY'S DATE:  12/26/2022  Patient name, age:  Sergio Rodriguez, 79 y.o. Indication: Sepsis of unknown origin, empiric  Additional antimicrobials:  cefepime, metronidazole    Allergies:  Penicillins   Actual Weight:    Wt Readings from Last 1 Encounters:   12/26/22 159 lb (72.1 kg)     Labs/Ancillary Data  Estimated Creatinine Clearance: 24 mL/min (A) (based on SCr of 3.03 mg/dL (H)). Recent Labs     12/26/22  1800   CREATININE 3.03*   *   WBC 21.8*     Procalcitonin   Date Value Ref Range Status   12/26/2022 0.39 (H) <0.09 ng/mL Final     Comment:           Suspected Sepsis:  <0.50 ng/mL     Low likelihood of sepsis. 0.50-2.00 ng/mL     Increased likelihood of sepsis. Antibiotics encouraged. >2.00 ng/mL     High risk of sepsis/shock. Antibiotics strongly encouraged. Suspected Lower Resp Tract Infections:  <0.24 ng/mL     Low likelihood of bacterial infection. >0.24 ng/mL     Increased likelihood of bacterial infection. Antibiotics encouraged. With successful antibiotic therapy, PCT levels should decrease rapidly. (Half-life of 24 to   36 hours.)        Procalcitonin values from samples collected within the first 6 hours of systemic infection   may still be low. Retesting may be indicated. Values from day 1 and day 4 can be entered into the Change in Procalcitonin Calculator   ([email or web address]-pct-[email or web address] com) to determine the patient's Mortality Risk Prognosis        In healthy neonates, plasma Procalcitonin (PCT) concentrations increase gradually after   birth, reaching peak values at about 24 hours of age then decrease to normal values below   0.5 ng/mL by 48-72 hours of age.        No intake or output data in the 24 hours ending 12/26/22 2048  Temp: 97.7 F (36.5 C)    Recent vancomycin administrations                     vancomycin (VANCOCIN) 1,750 mg in dextrose 5 % 500 mL IVPB (mg) 1,750 mg New Bag 12/26/22 2028                  Culture Date / Source  /  Results  Pending    MRSA Nasal Swab: not ordered. Order placed by pharmacy. PLAN   Initial loading dose of 25mg/kg (max of 2,500 mg) = 1750  mg  x 1, then dosing by levels. Ensured BUN/sCr ordered at baseline and every 48 hours x at least 3 levels, then at least weekly. Vancomycin level ordered for 12/27/22 @ 2000. Will use concentration guided method for dosing. Please see daily notes for plan. Vancomycin Target Concentration Parameters  Treatment  Population Target AUC/MEGAN Target Trough   Invasive MRSA Infection (bacteremia, pneumonia, meningitis, endocarditis, osteomyelitis)  Sepsis (undifferentiated) 400-600 N/A   Infection due to non-MRSA pathogen  Empiric treatment of non-invasive MRSA infection  (SSTI, UTI) <500 10-15 mg/L   CrCl < 29 mL/min  Rapidly fluctuating serum creatinine   EDELMIRA N/A < 15 mg/L     Renal replacement therapy is dosed by levels, per hospital protocol. Abbreviations  * Pauc: probability that AUC is >400 (efficacy); Pconc: probability that Ctrough is above 20 ?g/mL (toxicity); Tox: Probability of nephrotoxicity, based on Loddavian et al. Clin Infect Dis 2009. Thank you for the consult. Pharmacy will continue to follow.    Kiya Kennedy, PharmD, BCPS

## 2022-12-27 NOTE — PROGRESS NOTES
Cefepime Extended Interval Interchange    The following ordered dose of Cefepime has been changed to optimize its pharmacodynamic profile as approved by the Patient Care Review Committee. Ordered Dose  __ 2 gm  IV every 8 hrs (240 minute infusion)      CrCl Dose   >60 1-2 gm q8-12hrs over 4 hours   30-59 or CRRT 1-2gm f10-49dss over 4 hours   <30 500mg-2gm q24hrs over 4 hours or 1gm q12hrs over 4 hours   <10 or HD 500mg-1gm q24hrs over 4 hours       New Dose    Cefepime   1 gm  IV q 12 hrs  over 4 hours. Pharmacists should be contacted for issues concerning drug compatibility with multiple IV medications. All doses will be prepared using 50ml bag to be infused over 4-hours at a rate of 12.5ml/hr.     Thank Jeffory Snellen, RPH12/26/65828:02 PM

## 2022-12-27 NOTE — CONSULTS
Palliative Care Inpatient Consult    NAME:  Lance Tomlinson  MEDICAL RECORD NUMBER:  [id number]  AGE: 79 y.o. GENDER: male  : 1955  TODAY'S DATE:  2022    Reasons for Consultation:    Provision of information regarding PC and/or hospice philosophies  Complex, time-intensive communication and interdisciplinary psychosocial support  Clarification of goals of care and/or assistance with difficult decision-making  Guidance in regards to resources and transition(s)    Code Status:     Members of PC team contributing to this consultation are :  Adam Herrera Rn    History of Present Illness     The patient is a 79 y.o. Non- / non  male who presents with Other (Loss of gag reflex)    Referred to Palliative Care by   [x] Physician   [] Nursing  [] Family Request   [] Other:       He was admitted to the primary service for Hypernatremia [E87.0]. His hospital course has been associated with Hypernatremia.  The patient has a complicated medical history and has been hospitalized since 2022  5:35 PM.    Active Hospital Problems    Diagnosis Date Noted    Chronic combined systolic and diastolic congestive heart failure ([facility] [I50.42] 2022     Priority: Medium    Hypernatremia [E87.0] 2022     Priority: Medium    Acute respiratory failure with hypoxia and hypercapnia (HCC) [J96.01, J96.02] 2022     Priority: Medium    Aspiration pneumonia, unspecified aspiration pneumonia type, unspecified laterality, unspecified part of lung ([address] [J69.0] 2022     Priority: Medium    Septic shock ([facility] [A41.9, R65.21] 2022     Priority: Medium       Data        Code Status: DNR-CC     ADVANCED CARE PLANNING:  Patient has capacity for medical decisions: no  Health Care Power of : yes  Living Will: not asked     Personal, Social, and Family History  Marital Status: single  Living situation:nursing home  Yenifer/Spiritual History:   not assessed, pt not able to answer  Psychological Distress: moderate  Does patient understand diagnosis/treatment? no  Does family/caregiver understand diagnosis/treatment? yes    Assessment        Palliative Performance Scale:    ___100% Full ambulation; normal activity and work; no evidence of disease; able to do own self care; normal intake; fully conscious  ___90% Full ambulation; normal activity and work; some evidence of disease; able to do own self care; normal intake; fully conscious  ___80% Full ambulation; normal activity with effort; some evidence of disease; able to do own self care; normal or reduced intake; fully conscious  ___70% Ambulation reduced; unable to perform normal job/work; significant disease; able to do own self care; normal or reduced intake; fully conscious  ___60%  Ambulation reduced; cannot do hobbies/housework; significant disease; occasional assist; intake normal or reduced; fully conscious/some confusion  x___50%  Mainly sit/lie; can't do any work; extensive disease; considerable assist; intake normal or reduced; fully conscious/some confusion  ___40%  Mainly in bed; extensive disease; mainly assist; intake normal or reduced; fully conscious/ some confusion   ___30%  Bed bound; extensive disease; total care; intake reduced; fully conscious/some confusion  ___20%  Bed bound; extensive disease; total care; intake minimal; drowsy/coma  ___10%  Bed bound; extensive disease; total care; mouth care only; drowsy/coma  ___0       Death       Risk Assessments:    Ji Risk Score: @JI@    Readmission Risk Score: 23.1        1 Year Mortality Risk Score: @1YEARMORTALITYRISK@     Plan        Palliative Interaction:  Received referral for consultation. Pt admitted through ER. Physicians have spoken to patient's [facility] primary Joni Gale. Notified that kidneys are failing and offering dialysis.   Joni Gale relates that he would like to make his brother a [facility] and set him up with hospice care and comfort care given his medical history. I called and spoke with brother Stephanie Mandujano to confirm the above. Stephanie Mandujano relates that they want hospice care, but he is unsure which hospice company to use. I let him know that we can talk about that in the morning once his brother has arrived. His brother is flying into [place] this evening from [place]. I let him know I will call him back with the phone number for palliative care office. 1710: As I am talking with Stephanie Mandujano I am notified by unit Manager that patient has passed. I prepared Waldemar that I have just been given an update and it is difficult news. I notified Stephanie Mandujano that his brother Junior Maynard has passed. I let him know that I had been in to see him a half hour or so before and he was awake, not communicating with me, but not in distress. Waldemar thanks me for the information and relates that he is glad he is not suffering. He relates he is glad that he took the time this afternoon to come up and see him. Stephanie Mandujano relates he left the hospital not that long ago. Stephanie Mandujano relates he will not come back to hospital. Stephanie Mandujano relates that they would want to use [facility]  home. I gave spiritual care Waldemar's contact number and that they will be using [facility]. Spiritual care will update Waldemar. Stephanie Mandujano relates to me that he spoke to his brother Jaqueline Cushing who has not boarded his plan yet. Waldemar notified Jaqueline Cushing of Alex's passing. Condolences and support given.       Education/support to staff  Education/support to family  Education/support to patient  Communications with primary service  Providing support for coping/adaptation/distress of family  Providing support for coping/adaptation/distress of patient  Discussing meaning/purpose   Specific spiritual beliefs/practices  Code status clarified: [facility]  Palliative care orders introduced  Validating patient/family distress  Continued communication updates  Recognizing, reflecting, and empathizing with family members' anticipatory grief  Family decided on hospice referral, brother coming from [place] will be in tomorrow. Brother Kvng Hernandez is not sure what hospice to use. Will talk with family tomorrow. Notified brother Kvng Hernandez of Alex's passing. Principle Problem/Diagnosis:  Hypernatremia [E87.0]    Goals of care evaluation:  The patient goals of care are provide comfort care/support/palliation/relieve suffering, preparation for death, achievement of a peaceful death, and set up for hospice care   Goals of care discussed with:    [] Patient independently    [] Patient and Family    [x] Family or Healthcare DPOA independently    [] Unable to discuss with patient, family/DPOA not present    Code Status  DNR-CC    Other recommendations:  Please call with any palliative questions or concerns. Palliative Care Team is available via perfect serve or via phone - [phone]. Palliative Care will continue to follow Mr. Valiente Breeding care as needed. Thank you for allowing Palliative Care to participate in the care of Mr. Jesenia Hawkins .     Electronically signed by   Jocy Crandall RN  Palliative Care Team  on 12/27/2022 at 5:24 PM    Palliative care office: [phone]

## 2022-12-27 NOTE — ED NOTES
Pt turned to the right side. Pillow placed under the left hip.       Angelia Conway, RN  12/27/22 [address], RN  12/27/22 4576

## 2022-12-27 NOTE — PROGRESS NOTES
[id number]  Writer was alerted to asystole on heart monitor. Writer entered room along with Pedro Gould RN. Patient not responsive to voice or sternal rub. Writer auscultated an absence of heart and lung sounds. Auscultation confirmed by Pedro Gould RN. Writer called residents so that they can confirm.

## 2022-12-27 NOTE — PROGRESS NOTES
Patient admitted into room 2063. VSS. Patient does not respond appropriately towards commands or questions therefore most of admission questions unable to be answered.

## 2022-12-27 NOTE — ED NOTES
Pt turned to the left side. Pillow placed under the right hip.        Christin Gonzáles RN  12/27/22 Nallely Bowling RN  12/27/22 0588

## 2022-12-27 NOTE — PLAN OF CARE
Problem: Discharge Planning  Goal: Discharge to home or other facility with appropriate resources  Outcome: Progressing  Flowsheets (Taken 12/27/2022 1115)  Discharge to home or other facility with appropriate resources: Identify barriers to discharge with patient and caregiver

## 2022-12-27 NOTE — CODE DOCUMENTATION
Code status discussion:    I had a long discussion with the patient's brother, Philip Zhou in presence of Dr Francisca Saint. Patient's current condition laboratory and radiographic findings as well as recommendations of physicians consulted on the case were discussed with the patient's brother in detail in simple [place]. All questions and concerns of the family were addressed, and appropriate emotional support was provided. After understanding the patient's current medical condition, family decided that they wanted the patient's code status be changed to [facility], and they will sign the [facility] order form as soon as they get to this facility. I honored the family's wishes, and changed the patient's code status to [facility]. I also consulted Hospice care as per family's request.  Emotional support given.     Jeancarlos Reyna MD  Family medicine PGY3  10:17 AM EST

## 2022-12-27 NOTE — ED NOTES
Lab called with critical values. Glucose 459    Dr. Mraci Wood and primary RN Chris Evans notified.       Jairo Weber RN  12/27/22 0638

## 2022-12-27 NOTE — PROGRESS NOTES
Prayer at bedside; patient did not engage in conversation; got medical update from [facility];      12/27/22 1520   Encounter Summary   Encounter Overview/Reason  Spiritual/Emotional Needs   Service Provided For: Patient   Referral/Consult From: Rounding   Last Encounter  12/27/22   Complexity of Encounter Moderate   Spiritual/Emotional needs   Type Spiritual Support   Assessment/Intervention/Outcome   Assessment Unable to assess   Intervention Prayer (assurance of)/[place]   Outcome Did not respond

## 2022-12-27 NOTE — PROGRESS NOTES
HARRY TATE [facility] Internal Medicine  Lori Tinoco MD; Cuong Mcginnis MD; Crystal Mcdaniel MD; MD Ernie Reyez MD; Jerilyn Phelan MD  LAN JACOBSON [facility] Internal Medicine   [address] [facility]                 Date:   12/27/2022  Patientname:  Michelle De Leon  Date of admission:  12/26/2022  5:35 PM  MRN:   [id number]  Account:  [de-identified]  YOB: 1955  PCP:    Raisa Fall MD  Room:   06/06  Code Status:    Full code      Chief Complaint:     Chief Complaint   Patient presents with    Other     Loss of gag reflex       History of Present Illness:     Michelle De Leon is a 79 y.o. Non- / non  male who presents with Other (Loss of gag reflex) and is admitted to the hospital for the management of Hypernatremia. Significant medical history includes bilateral BKA, peripheral infarction due to embolism, CKD, CHF, A. fib on Coumadin, HTN, chronic respiratory failure, oxygen dependence, PVD,  DM type II and history of AAA bleed. Patient was brought to the ED from nursing facility via EMS. Per report patient was having difficulty breathing and has loss of gag reflex. At baseline patient is alert but nonverbal.  On exam he is chronically ill appearing with tachycardia and bilateral rhonchi. In ED h he was found to be hyponatremic with a's NA of 162, dehydrated with severe EDELMIRA. Creatinine 3.03 with baseline of 1.6. Moderate hydration of 500 mL bolus given due to history of CHF and BNP of 2692. Respiratory status continued to get worse requiring placement on BiPAP. Chest x-ray does show concern for aspiration pneumonia. WBC 21.8. He does have a history of aspiration and has a PEG tube present. At the facility he has been receiving puréed diet with honey thickened liquids along with tube feeding. He then began experiencing hypotension resulting in need for central line placement and Levophed for pressor support.   Patient is unable to participate in review of systems. Appears comfortable on BiPAP. There are no aggravating or alleviating factors. Symptoms are acute, constant and severe. 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 ([facility]     Acquired absence of right leg below knee ([facility]     Acute kidney failure (HCC)     Acute respiratory failure with hypoxia (HCC)     Cerebral infarction due to embolism (HCC)     Chronic kidney disease, unspecified     Chronic systolic (congestive) heart failure (HCC)     Depression     Heart failure (HCC)     Hypertension     Muscle weakness (generalized)     Other symbolic dysfunctions     Peripheral vascular disease (HCC)     Type 2 diabetes mellitus ([facility]         Past Surgical History:     Past Surgical History:   Procedure Laterality Date    LEG AMPUTATION BELOW KNEE Bilateral     UPPER GASTROINTESTINAL ENDOSCOPY N/A 11/23/2022    EGD ESOPHAGOGASTRODUODENOSCOPY performed by Jose Barclay MD at [facility] ENDO        Medications Prior to Admission:     Prior to Admission medications    Medication Sig Start Date End Date Taking? Authorizing Provider   insulin glargine (BASAGLAR KWIKPEN) 100 UNIT/ML injection pen Inject 15 Units into the skin nightly   Yes Historical Provider, MD   glucagon 1 MG injection Inject 1 kit into the muscle as needed (BG <60 and unresponsive)   Yes Historical Provider, MD   warfarin (COUMADIN) 1 MG tablet Take 1 mg by mouth every evening Indications:  With 6 mg tablet to = 7 mg total dose   Yes Historical Provider, MD   carvedilol (COREG) 6.25 MG tablet 6.25 mg by Per G Tube route 2 times daily   Yes Historical Provider, MD   furosemide (LASIX) 40 MG tablet 40 mg by Per G Tube route daily   Yes Historical Provider, MD   magnesium oxide (MAG-OX) 400 MG tablet 400 mg by Per G Tube route 2 times daily   Yes Historical Provider, MD   sertraline (ZOLOFT) 50 MG tablet 50 mg by Per G Tube route daily   Yes Historical Provider, MD   warfarin (COUMADIN) 6 MG tablet 6 mg by Per G Tube route every evening Indications: with 1 mg tablet to = 7 mg total dose   Yes Historical Provider, MD   spironolactone (ALDACTONE) 25 MG tablet 25 mg by Per G Tube route daily   Yes Historical Provider, MD   polyethylene glycol (GLYCOLAX) 17 GM/SCOOP powder 17 g by Per G Tube route daily   Yes Historical Provider, MD   HYDROcodone-acetaminophen (NORCO) 5-325 MG per tablet 1 tablet every 6 hours as needed for Pain. Indications: g-tube    Historical Provider, MD   gabapentin (NEURONTIN) 100 MG capsule 200 mg by Per G Tube route at bedtime. 11/11/22 11/18/22  Historical Provider, MD   insulin lispro (HUMALOG) 100 UNIT/ML SOLN injection vial Inject into the skin 3 times daily (with meals) 351-400: 10 units, 301-350: 8 units, 351-300: 6 units, 201-250: 4 units, 151-200: 2 units    Historical Provider, MD   senna-docusate (Skippy Spell) 8.6-50 MG per tablet 1 tablet by Per G Tube route every 12 hours as needed for Constipation    Historical Provider, MD        Allergies:     Penicillins    Social History:     Tobacco:    has no history on file for tobacco use. Alcohol:      has no history on file for alcohol use. Drug Use:  has no history on file for drug use. Family History:     No family history on file.     Investigations:      Laboratory Testing:  Recent Results (from the past 24 hour(s))   CBC with Auto Differential    Collection Time: 12/26/22  6:00 PM   Result Value Ref Range    WBC 21.8 (H) 3.5 - 11.0 k/uL    RBC 4.53 4.5 - 5.9 m/uL    Hemoglobin 13.3 (L) 13.5 - 17.5 g/dL    Hematocrit 42.3 41 - 53 %    MCV 93.4 80 - 100 fL    MCH 29.4 26 - 34 pg    MCHC 31.5 31 - 37 g/dL    RDW 17.7 (H) 11.5 - 14.9 %    Platelets 163 457 - 779 k/uL    MPV 9.8 6.0 - 12.0 fL    Seg Neutrophils 72 (H) 36 - 66 %    Lymphocytes 2 (L) 24 - 44 %    Monocytes 8 (H) 1 - 7 %    Eosinophils % 0 0 - 4 %    Basophils 0 0 - 2 %    Bands 18 (H) 0 - 10 %    Segs Absolute 15.70 (H) 1.3 - 9.1 k/uL Absolute Lymph # 0.44 (L) 1.0 - 4.8 k/uL    Absolute Mono # 1.74 (H) 0.1 - 1.3 k/uL    Absolute Eos # 0.00 0.0 - 0.4 k/uL    Basophils Absolute 0.00 0.0 - 0.2 k/uL    Absolute Bands # 3.92 (H) 0.0 - 1.0 k/uL    Morphology ANISOCYTOSIS PRESENT     Morphology FEW STOMATOCYTES    CMP    Collection Time: 12/26/22  6:00 PM   Result Value Ref Range    Glucose 140 (H) 70 - 99 mg/dL     (HH) 8 - 23 mg/dL    Creatinine 3.03 (H) 0.70 - 1.20 mg/dL    Est, Glom Filt Rate 22 (L) >60 mL/min/1.73m2    Calcium 12.7 (H) 8.6 - 10.4 mg/dL    Sodium 162 (HH) 135 - 144 mmol/L    Potassium 4.1 3.7 - 5.3 mmol/L    Chloride 109 (H) 98 - 107 mmol/L    CO2 43 (HH) 20 - 31 mmol/L    Anion Gap 10 9 - 17 mmol/L    Alkaline Phosphatase 144 (H) 40 - 129 U/L    ALT 38 5 - 41 U/L    AST 27 <40 U/L    Total Bilirubin 0.4 0.3 - 1.2 mg/dL    Total Protein 8.5 (H) 6.4 - 8.3 g/dL    Albumin 3.8 3.5 - 5.2 g/dL   Lipase    Collection Time: 12/26/22  6:00 PM   Result Value Ref Range    Lipase 24 13 - 60 U/L   Magnesium    Collection Time: 12/26/22  6:00 PM   Result Value Ref Range    Magnesium 4.7 (H) 1.6 - 2.6 mg/dL   Protime-INR    Collection Time: 12/26/22  6:00 PM   Result Value Ref Range    Protime 46.4 (H) 11.8 - 14.6 sec    INR 5.0 (HH)    Troponin    Collection Time: 12/26/22  6:00 PM   Result Value Ref Range    Troponin, High Sensitivity 178 (HH) 0 - 22 ng/L   Lactate, Sepsis    Collection Time: 12/26/22  6:00 PM   Result Value Ref Range    Lactic Acid, Sepsis 1.4 0.5 - 1.9 mmol/L   Procalcitonin    Collection Time: 12/26/22  6:00 PM   Result Value Ref Range    Procalcitonin 0.39 (H) <0.09 ng/mL   COVID-19 & Influenza Combo    Collection Time: 12/26/22  6:08 PM    Specimen: Nasopharyngeal Swab   Result Value Ref Range    Specimen Description . NASOPHARYNGEAL SWAB     Source . NASOPHARYNGEAL SWAB     SARS-CoV-2 RNA, RT PCR Not Detected Not Detected    INFLUENZA A Not Detected Not Detected    INFLUENZA B Not Detected Not Detected   Blood Culture 1    Collection Time: 12/26/22  7:00 PM    Specimen: Blood   Result Value Ref Range    Specimen Description . BLOOD     Special Requests R AC     Culture NO GROWTH <24 HRS    Lactate, Sepsis    Collection Time: 12/26/22  7:37 PM   Result Value Ref Range    Lactic Acid, Sepsis 1.4 0.5 - 1.9 mmol/L   Arterial Blood Gases    Collection Time: 12/26/22  9:04 PM   Result Value Ref Range    pH, Arterial 7.378 7.350 - 7.450    pCO2, Arterial 76.8 (HH) 35.0 - 45.0 mmHg    pO2, Arterial 61.5 (L) 80.0 - 100.0 mmHg    HCO3, Arterial 45.2 (H) 22.0 - 26.0 mmol/L    Positive Base Excess, Art 20.1 (H) 0.0 - 2.0 mmol/L    O2 Sat, Arterial 89.0 (L) 95 - 98 %    Carboxyhemoglobin 1.0 0 - 5 %    Methemoglobin 0.6 0.0 - 1.9 %    Pt Temp 37.0     O2 Device/Flow/% BIPAP     Respiratory Rate 25     Hussain Test PASS     Sample Site Right Brachial Artery     Pt. Position SEMI-FOWLERS     Mode 12/8     FIO2 100%     Text for Respiratory RESULTS GIVEN TO MD    Culture, Blood 2    Collection Time: 12/26/22  9:52 PM    Specimen: Blood   Result Value Ref Range    Specimen Description . BLOOD RIGHT HAND     Culture NO GROWTH <24 HRS    Basic Metabolic Panel    Collection Time: 12/27/22 12:07 AM   Result Value Ref Range    Glucose PENDING mg/dL    BUN PENDING mg/dL    Creatinine 3.29 (H) 0.70 - 1.20 mg/dL    Est, Glom Filt Rate 20 (L) >60 mL/min/1.73m2    Calcium 11.6 (H) 8.6 - 10.4 mg/dL    Sodium 153 (H) 135 - 144 mmol/L    Potassium 4.5 3.7 - 5.3 mmol/L    Chloride 101 98 - 107 mmol/L    CO2 40 (H) 20 - 31 mmol/L    Anion Gap 12 9 - 17 mmol/L   POC Glucose Fingerstick    Collection Time: 12/27/22 12:57 AM   Result Value Ref Range    POC Glucose 363 (H) 75 - 110 mg/dL       Imaging/Diagnostics:  XR CHEST PORTABLE    Result Date: 12/27/2022  Right IJ approach central venous catheter terminates in the superior vena cava, with no pneumothorax.      XR CHEST PORTABLE    Result Date: 12/26/2022  Residual but improving infiltrate in the right lower lobe.     CT CHEST ABDOMEN PELVIS WO CONTRAST Additional Contrast? None    Result Date: 12/26/2022  Mild bibasal infiltrates, greater on the right and small right pleural effusion suggesting pneumonia. Bilateral renal cysts, some which are hemorrhagic or containing proteinaceous material Mild superior endplate depression of L2 vertebral body which has developed from the prior exam. No acute intra-abdominal or intrapelvic abnormalities are noted. RECOMMENDATIONS: MRI lumbar spine if clinically indicated         Plan:     Patient status inpatient in the Medical ICU    Hypernatremia  -Sodium level -162  -nephrology consulted  -D5W at 150 ml/hr  -check sodium level every 4 hours for now  -Need to monitor fluid levels due to history of CHF. Acute Kidney Injury  -Creatinine 3.03; Baseline 1.6  -Has history of CKD  -fluid bolus in ED limited to 500 mL  -IV fluids per nephrology  -hold diuretics/nephrotoxic medications  -monitor BMP    Acute on chronic respiratory failure secondary to Pneumonia   -concerns for aspiration pneumonia  -IV antibiotic initiated in ED  -continue continue Flagyl, cefepime and vancomycin on admission  -Pharmacy to dose Vancomycin  -Has chronic PEG but was receiving puréed diet with honey thickened liquids at facility  -Consult speech therapy for swallow evaluation    Atrial fibrillation   -monitor telemetry and VS  -Chronic Coumadin  -Last INR 5.0  -Pharmacy to manage Coumadin    Diabetes Mellitus  -hold oral hypoglycemics/metformin for now  -POCT ac and hs with sliding scale coverage      Full code        Amador JaleelBARBARA - NP  12/27/2022  1:14 AM      Please note that this chart was generated using voice recognition Dragon dictation software. Although every effort was made to ensure the accuracy of this automated transcription, some errors in transcription may have occurred. [facility]: CHRISTIAN Morgan [address] [place] [address], [address] [place] [address].    Phone [phone]

## 2022-12-27 NOTE — PROGRESS NOTES
Dr. Brigitte Byrnes at bedside, updated on patients condition. State he spoke with patients brother Hayden Maldonado, who would like to change patients code status to [facility]. Medicine resident notified at bedside as well.

## 2022-12-27 NOTE — PROGRESS NOTES
Writer received López patient had ; spoke with Jose Coker, RN, Afia Saucedo RN and Rashel Patten RN, [facility]) who stated family had been notified of patient's death; writer called patient's brother Amol Meléndez who stated he had visited with patient earlier today and would not be coming to the hospital now; Waldemar explained that no family would be coming to the hospital; Amol Meléndez stated they are grateful patient's suffering is over; Amol Meléndez stated patient had a pre-arranged  and would call writer within the next half hour to confirm  home is [facility] in [facility], [address]; listening presence and support; staff support;     22 1800   Encounter Summary   Encounter Overview/Reason  Spiritual/Emotional Needs;Grief, Loss, and Adjustments   Service Provided For: Family   Referral/Consult From: Nurse   Support System Family members   Last Encounter  22   Complexity of Encounter High   Begin Time 1730   End Time  1800   Total Time Calculated 30 min   Spiritual/Emotional needs   Type Spiritual Support   Grief, Loss, and Adjustments   Type Death   Assessment/Intervention/Outcome   Assessment Coping; Hopeful;Powerlessness; Sad   Intervention Discussed illness injury and its impact; Explored/Affirmed feelings, thoughts, concerns;Sustaining Presence/Ministry of presence; Active listening   Outcome Coping;Engaged in conversation;Expressed feelings, needs, and concerns;Expressed Gratitude;Receptive

## 2022-12-27 NOTE — PROGRESS NOTES
Speech Language Pathology  [facility] [place] [facility]  Speech Language Pathology    Date: 12/27/2022  Patient Name: Crescencio Bone  YOB: 1955   AGE: 79 y.o. MRN: [id number]        Patient Not Available for Speech Therapy     Due to:  [] Testing  [] Hemodialysis  [x] Cancelled by RN - Order received for clinical bedside swallowing evaluation. Discussed ST recommendation for MBS to fully assess swallow function d/t Pt hx of dysphagia (including SILENT aspiration). RN defers ST evaluations at this time as hospice consult has been placed. ST to continue to follow. [] Surgery   [] Intubation/Sedation/Pain Medication  [] Medical instability  [] Other:      Completed by:  Robert Aly M.A., CCC-SLP

## 2022-12-27 NOTE — PLAN OF CARE
Patient is a 59-year-old male presents to the ED with EDELMIRA, uremic and metabolic encephalopathy. Patient have hypernatremia and metabolic alkalosis. Nephrology was consulted with recommendation to start dialysis. Dr. Mason Matthew spoke to brother of patient who is his POA, brother does not want to pursue dialysis and want to the patient to be comfortable and talk to hospice. Hospice was consulted, RN notified at ED.     Electronically signed by Trupti Maldonado MD on 12/27/2022 at 9:58 AM

## 2022-12-27 NOTE — PROGRESS NOTES
Medication History completed:    New medications: Basaglar, Norco, glucagon kit    Medications discontinued: none    Medications flagged for review: none    Changes to dosing:   Warfarin changed to 7 mg (6 mg + 1 mg) daily    Stated allergies: As listed    Other pertinent information: Medications confirmed with facility list ([facility]).      Thank you,  Gail Sullivan PharmD, BCPS  [phone]

## 2022-12-27 NOTE — ED NOTES
Patient O2 Sat 80% on Nasal Canula; per Dr. Claudio Daley, Respiratory placed parient on Bipap, Patient O2 sat now 93%     Raleigh Pollock RN  12/26/22 2023

## 2022-12-27 NOTE — ACP (ADVANCE CARE PLANNING)
Advance Care Planning     Advance Care Planning Activator (Inpatient)  Conversation Note      Date of ACP Conversation: 12/27/2022     Conversation Conducted with: Brother Db Ratliff. Secondary decision maker per Vahid Hearn notified that Kaye Padilla [facility] was on plan back here from [place]. ACP Activator: Jayme Bartholomew RN        Health Care Decision Maker:     Current Designated Health Care Decision Maker:     Primary Decision Maker: Dale Mary - Brother/Sister - [phone]    Today we documented Decision Maker(s) consistent with ACP documents on file. Care Preferences: Confirmed the following preferences with patient's brothsindy Ratliff. Ventilation: \"If you were in your present state of health and suddenly became very ill and were unable to breathe on your own, what would your preference be about the use of a ventilator (breathing machine) if it were available to you? \"      Would the patient desire the use of ventilator (breathing machine)?: no    \"If your health worsens and it becomes clear that your chance of recovery is unlikely, what would your preference be about the use of a ventilator (breathing machine) if it were available to you? \"     Would the patient desire the use of ventilator (breathing machine)?: No      Resuscitation  \"CPR works best to restart the heart when there is a sudden event, like a heart attack, in someone who is otherwise healthy. Unfortunately, CPR does not typically restart the heart for people who have serious health conditions or who are very sick. \"    \"In the event your heart stopped as a result of an underlying serious health condition, would you want attempts to be made to restart your heart (answer \"yes\" for attempt to resuscitate) or would you prefer a natural death (answer \"no\" for do not attempt to resuscitate)? \" no       [] Yes   [] No   Educated Patient / Selam Amaro regarding differences between Advance Directives and portable DNR orders. Length of ACP Conversation in minutes:      Conversation Outcomes:  [x] ACP discussion completed  [] Existing advance directive reviewed with patient; no changes to patient's previously recorded wishes  [] New Advance Directive completed  [] Portable Do Not Rescitate prepared for Provider review and signature  [] POLST/POST/MOLST/MOST prepared for Provider review and signature      Follow-up plan:    [] Schedule follow-up conversation to continue planning  [] Referred individual to Provider for additional questions/concerns   [] Advised patient/agent/surrogate to review completed ACP document and update if needed with changes in condition, patient preferences or care setting    [] This note routed to one or more involved healthcare providers    Confirmed with Verenice Hernandez [address] (since we thought primary decision maker was on a flight and we would not be able to get through to him.)  Family wants [facility] and hospice consult. This is also documented in provider notes.       WILLIAM MANJARREZ [facility] C[facility] Coordinator  Elbert Nicole BSN, [address] [facility]  [address] [place] [address] [phone]

## 2022-12-27 NOTE — PROGRESS NOTES
Pharmacy Note  Warfarin Consult    Ange Lin is a 79 y.o. male for whom pharmacy has been consulted to manage warfarin therapy. Consulting Physician: Don Lan., NP  Reason for Admission: hypernatremia    Warfarin dose prior to admission: 7 mg daily  Warfarin indication: afib  Target INR range: 2-3     Past Medical History:   Diagnosis Date    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 ([address]     Acquired absence of right leg below knee ([address]     Acute kidney failure (HCC)     Acute respiratory failure with hypoxia (HCC)     Cerebral infarction due to embolism (HCC)     Chronic kidney disease, unspecified     Chronic systolic (congestive) heart failure (HCC)     Depression     Heart failure (HCC)     Hypertension     Muscle weakness (generalized)     Other symbolic dysfunctions     Peripheral vascular disease (HCC)     Type 2 diabetes mellitus ([address]                 Recent Labs     12/26/22  1800   INR 5.0*     Recent Labs     12/26/22  1800   HGB 13.3*   HCT 42.3          Current warfarin drug-drug interactions: Flagyl      Date             INR        Dose   12/27/2022            5.0       hold    Daily PT/INR while inpatient. Thank you for the consult. Will continue to follow.     Yair Gunderson [facility],PharmD,  12/27/2022, 3:01 PM

## 2022-12-27 NOTE — PROCEDURES
PROCEDURE NOTE - CENTRAL VENOUS LINE PLACEMENT    PATIENT NAME: Lexus Weinstein RECORD NO. [id number]  DATE: 12/26/2022  ATTENDING PHYSICIAN: Dr. Lorenz Monday DIAGNOSIS:  vascular access and centrally administered medications  POSTOPERATIVE DIAGNOSIS:  Same  PROCEDURE PERFORMED:  Right Femoral Vein Central Line Insertion  PERFORMING PHYSICIAN: Esther Herrera MD  ANESTHESIA:  Local utilizing 1% lidocaine  ESTIMATED BLOOD LOSS:  Less than 25 ml  COMPLICATIONS:  None immediately appreciated. DISCUSSION:  Misbah Kelsey is a 79y.o.-year-old male who requires central IV access vascular access and centrally administered medications. The history and physical examination were reviewed and confirmed. CONSENT: Unable to be obtained due to the emergent nature of this procedure. PROCEDURE:  A timeout was initiated by the bedside nurse and was confirmed by those present. The patient was placed in a supine position. The skin overlying the Right Internal Jugular Vein was prepped with chlorhexadine and draped in sterile fashion. The skin was infiltrated with local anesthetic. The vessel and surrounding anatomy was visualized using ultrasound. Through the anesthetized region, the introducer needle was inserted into the internal jugular vein returning dark red non pulsatile blood. A guidewire was placed through the center of the needle with no resistance. Ultrasound confirmed presence of wire in the vein. A small incision made in the skin with a #11 scalpel blade. The dilator was inserted into the skin and vein over guidewire using Seldinger technique. The dilator was then removed and the 7F 16cm catheter was placed in the vein over the guidewire using Seldinger technique. The guidewire was then removed and all ports aspirated and flushed appropriately. The catheter then secured using silk suture and a temporary sterile dressing was applied. No immediate complication was evident.   All sponge, instrument and needle counts were correct at the completion of the procedure. Post procedural xray ordered.       David Garcia MD  10:56 PM, 12/26/22

## 2022-12-28 LAB
CULTURE: NO GROWTH
EKG ATRIAL RATE: 109 BPM
EKG P AXIS: 46 DEGREES
EKG P-R INTERVAL: 158 MS
EKG Q-T INTERVAL: 326 MS
EKG QRS DURATION: 88 MS
EKG QTC CALCULATION (BAZETT): 439 MS
EKG R AXIS: -52 DEGREES
EKG T AXIS: 61 DEGREES
EKG VENTRICULAR RATE: 109 BPM
MRSA, DNA, NASAL: NEGATIVE
SPECIMEN DESCRIPTION: NORMAL
SPECIMEN DESCRIPTION: NORMAL

## 2022-12-28 NOTE — PROGRESS NOTES
[facility]     Patient Death Note                                      DEATH                Room # 2063/3-01   Name: Imelda Gooden            Age: 79 y.o. Gender: male          Baptism: None   Place of Jehovah's witness: Unknown  Admit Date & Time: 2022  5:35 PM     Referral: López Ingram   Actual date of death: 2022   TOD: 5       SITUATION AT DEATH:  Patient is a 79 y.o. male. Patient's family recently changed patient's code status to DNR/CC and was to meet with hospice tomorrow. Patient  suddenly and peacefully. This is a 's case. SPIRITUAL/EMOTIONAL INTERVENTION:  Writer facilitated communication between patient's brother, London Abraham and staff; coordinated with London Abraham (over the phone) completion of release of body form. Waldemar explained that the family was grateful patient's suffering was over. London Abraham stated family would come to [facility] nursing unit sometime on 22 to  patient's belongings (a blanket). Helped staff complete paperwork. Provided staff support. Family Received Grief Packet? No     HOME:  Name: [facility]: [place]  Phone Number: [phone]    NEXT OF KIN:  Name: Elmer Baeza  Relationship: Brother  Street Address: [address] [place] [address] Road: [place]  State: [place]  Zip code: [address]   Phone Number: [phone]    [facility].  Ricardo Mandaen  2022 8:03 PM       22   Encounter Summary   Encounter Overview/Reason  Spiritual/Emotional Needs;Grief, Loss, and Adjustments   Service Provided For: Family   Referral/Consult From: Nurse   Support System Family members   Last Encounter  22   Complexity of Encounter High   Begin Time 1830   End Time  1940   Total Time Calculated 70 min   Spiritual/Emotional needs   Type Spiritual Support   Grief, Loss, and Adjustments   Type Death   Assessment/Intervention/Outcome   Assessment Coping   Intervention Active listening;Discussed meaning/purpose;Explored/Affirmed feelings, thoughts, concerns;Sustaining Presence/Ministry of presence   Outcome Coping;Engaged in conversation;Expressed feelings, needs, and concerns;Expressed Gratitude;Receptive

## 2022-12-28 NOTE — PROGRESS NOTES
Pt picked up by [facility] with security. Release of body signed and copy placed in pt chart. Belongings at nurses station for family to .

## 2022-12-31 LAB
CULTURE: NORMAL
CULTURE: NORMAL
Lab: NORMAL
SPECIMEN DESCRIPTION: NORMAL
SPECIMEN DESCRIPTION: NORMAL

## 2023-01-01 NOTE — PROCEDURES
Patient Name: Tej Gallegos   Medical Record Number: [id number]  Date: 11/18/2022   Time: 5:46 PM   Room/Bed: 2009/2009-01  Central Line Placement Procedure Note  Indication: poor peripheral access and centrally administered medications    Consent: Unable to be obtained due to the emergent nature of this procedure. Procedure: Initially I looked at the right internal jugular and left internal jugular vein. However, the veins were very flat and could not my opinion be accessed for an internal jugular vein. The patient was positioned appropriately and the skin over the right femoral vein was prepped with Chloraprep. Local anesthesia was obtained by infiltration using 1% Lidocaine without epinephrine. A large bore needle was used to identify the vein. A guide wire was then inserted into the vein through the needle. A triple lumen catheter was then inserted into the vessel over the guide wire using the Seldinger technique. All ports showed good, free flowing blood return and were flushed with saline solution. The catheter was then securely fastened to the skin with sutures and covered with a sterile dressing. A post procedure X-ray was not indicated. The patient tolerated the procedure well.     Complications: None    Electronically signed by Anabelle Dueñas MD on 11/18/2022 at 5:46 PM 2023

## 2023-01-04 NOTE — DISCHARGE SUMMARY
[facility]    Discharge Summary     Patient ID: Liset Marroquin  :  1955   MRN: [id number]     ACCOUNT:  [de-identified]   Patient's PCP: Milo Warren MD  Admit Date: 2022   Discharge Date: 2023    Length of Stay: 1  Code Status:  Prior  Admitting Physician: Ron Otero MD  Discharge Physician: Ron Otero MD     Active Discharge Diagnoses:     Primary Problem  Hypernatremia      [place] Problems    Diagnosis Date Noted    Chronic combined systolic and diastolic congestive heart failure ([address] [I50.42] 2022     Priority: Medium    Hypernatremia [E87.0] 2022     Priority: Medium    Acute respiratory failure with hypoxia and hypercapnia ([address] [J96.01, J96.02] 2022     Priority: Medium    Aspiration pneumonia, unspecified aspiration pneumonia type, unspecified laterality, unspecified part of lung ([address] [J69.0] 2022     Priority: Medium    Septic shock ([address] [A41.9, R65.21] 2022     Priority: Medium       Admission Condition:  poor    Discharged Condition: fExpired    Hospital Stay:     Hospital Course:  Liset Marroquin is a 79 y.o. male who was admitted for the management of Hypernatremia , presented to ER with Other (Loss of gag reflex)    Patient not seen by me   On admission      The patient is a 79 y.o. Non- / non  male who presents withOther (Loss of gag reflex)     Patient presented to the ED from nursing home after started having shortness of breath and altered mental status mainly observed by decreased gag reflex. At the ED patient somnolent with altered mental status, not responsive to commands.   Patient had tachycardia with O2 sat dropped 78% at room air, patient was placed on BiPAP,  labs were significant for hypernatremia 162, creatinine of 3.03, .  UA positive for UTI, chest x-ray suggestive of pneumonia, sepsis work-up was done and patient was started on IV cefepime, Zyvox, Flagyl. Patient had a history of EDELMIRA in October 2022, patient needed dialysis improved at that time and was discharged. Nephrology was consulted in the ED, decision was that patient needs dialysis, Dr. Cristina Hopson spoke to [address] [place] [address] of the patient (brother), brother did not want to pursue dialysis and changed CODE STATUS of patient to comfort care. Patient was admitted for inpatient hospice and palliative care management. Catrina Lyman, RN   Registered Nurse   Nursing   Progress Notes       Addendum   Date of Service:  12/27/2022  5:08 PM                         80  Writer was alerted to asystole on heart monitor. Writer entered room along with Barbara Connor RN. Patient not responsive to voice or sternal rub. Writer auscultated an absence of heart and lung sounds. Auscultation confirmed by Barbara Connor RN. Writer called residents so that they can confirm                                                                                                                                                               Death Pronouncement Note  Patient's Name: Parish Verdugo   Patient's YOB: 1955  MRN Number: [id number]            Admitting Provider: Leonela Sneed MD  Attending Provider: Leonela Sneed MD     Patient was examined and the following were absent: Pulses, Blood Pressure, and Respiratory effort. Asystole was noted on cardiac monitor.       I declared the patient dead on 12/27/2022 at 5:09 PM     Preliminary Cause of Death: Cardiopulmonary arrest [facility])  Electronically signed by Oscar Souza MD on 12/27/22 at 5:20 PM EST                        ,   diology:    XR CHEST PORTABLE    Result Date: 12/27/2022  EXAMINATION: ONE XRAY VIEW OF THE CHEST 12/26/2022 11:00 pm COMPARISON: 12/26/2022 HISTORY: ORDERING SYSTEM PROVIDED HISTORY: Line placement TECHNOLOGIST PROVIDED HISTORY: Line placement Reason for Exam: Line placement FINDINGS: There is a right IJ central venous approach catheter with terminating in the superior vena cava. Cardiac and mediastinal silhouettes appear similar. Right infrahilar airspace disease is again identified. No new infiltrate is seen. Degenerative changes are seen in the shoulders and spine. No acute osseous abnormality is identified. Right IJ approach central venous catheter terminates in the superior vena cava, with no pneumothorax. XR CHEST PORTABLE    Result Date: 12/26/2022  EXAMINATION: ONE XRAY VIEW OF THE CHEST 12/26/2022 5:42 pm COMPARISON: November 28, 2022 HISTORY: ORDERING SYSTEM PROVIDED HISTORY: sob TECHNOLOGIST PROVIDED HISTORY: sob Reason for Exam: SOB FINDINGS: Redemonstration of cardiomegaly. The previously seen right lower lobe infiltrate demonstrates improvement with some residual infiltrate. Left lung and bilateral pleural spaces appear unremarkable. Residual but improving infiltrate in the right lower lobe. CT CHEST ABDOMEN PELVIS WO CONTRAST Additional Contrast? None    Result Date: 12/26/2022  EXAMINATION: CT OF THE CHEST, ABDOMEN, AND PELVIS WITHOUT CONTRAST 12/26/2022 3:48 pm TECHNIQUE: CT of the chest, abdomen and pelvis was performed without the administration of intravenous contrast. Multiplanar reformatted images are provided for review. Automated exposure control, iterative reconstruction, and/or weight based adjustment of the mA/kV was utilized to reduce the radiation dose to as low as reasonably achievable.  COMPARISON: 09/27/2022 HISTORY: ORDERING SYSTEM PROVIDED HISTORY: possible aspiration, [place], eval for uropathy TECHNOLOGIST PROVIDED HISTORY: possible aspiration, [place], eval for uropathy Decision Support Exception - unselect if not a suspected or confirmed emergency medical condition->Emergency Medical Condition ([place]) Reason for Exam: possible aspiration, [place], eval for uropathy FINDINGS: Patient motion artifact degrading image resolution Chest: Mediastinum: There are a few small nonspecific lymph nodes seen in the middle mediastinum. No suspicious lymphadenopathy. Lungs/pleura: Mild bibasal infiltrates. Small right pleural effusion. No pulmonary masses are noted. Cardiomediastinal Structures: Coronary arterial calcifications are seen. Intimal calcifications associated with the thoracic aorta. Cardiac chambers are mildly enlarged Soft Tissues/Bones: There are hypertrophic degenerative changes in the thoracic spine. No acute osseous abnormalities. FINDINGS:. Organs: Liver is normal in size and density. No focal masses identified. No evidence of intrahepatic ductal dilatation. Spleen is normal size. The gallbladder is unremarkable. Both adrenal glands are normal.  Pancreas is atrophic in appearance. Bilateral renal cysts, of some of which are hyperdense. The kidneys are otherwise normal in size and attenuation without evidence of hydronephrosis or renal calculi. GI/Bowel: The visualized bowel and mesentery show no mass lesions. Mild colonic diverticulosis. No evidence of diverticulitis. Gastrostomy tube in the stomach. Normal appendix Pelvis: No intrapelvic mass is identified. Bladder contains a catheter and is decompressed. Rectum is intact. Peritoneum/Retroperitoneum: No free fluid. No lymphadenopathy. No evidence of pneumoperitoneum. Bones/Soft Tissues: Bilateral fat containing inguinal hernias. Vascular calcifications are seen compatible with atherosclerotic disease. Degenerative changes seen in the visualized spine. Mild superior endplate depression of L2 vertebral body which has developed from the prior exam.     Mild bibasal infiltrates, greater on the right and small right pleural effusion suggesting pneumonia. Bilateral renal cysts, some which are hemorrhagic or containing proteinaceous material Mild superior endplate depression of L2 vertebral body which has developed from the prior exam. No acute intra-abdominal or intrapelvic abnormalities are noted.  RECOMMENDATIONS: MRI lumbar spine if clinically indicated     Patien     Consultations:    Consults:     Final Specialist Recommendations/Findings:   PHARMACY TO DOSE VANCOMYCIN  IP CONSULT TO NEPHROLOGY  IP CONSULT TO HOSPICE  IP CONSULT TO DIETITIAN  PHARMACY TO DOSE WARFARIN  IP CONSULT TO SOCIAL WORK  IP CONSULT TO PALLIATIVE CARE      The patient was seen and examined on day of discharge and this discharge summary is in conjunction with any daily progress note from day of discharge. Leatha Hernandez MD  2023  1:47 PM      Thank you Dr. Maxim Guido MD for the opportunity to be involved in this patient's care.

## 2023-01-10 ENCOUNTER — TELEPHONE (OUTPATIENT)
Dept: GASTROENTEROLOGY | Age: 68
End: 2023-01-10

## 2023-01-10 NOTE — TELEPHONE ENCOUNTER
Clearance was faxed 12/22/22. Called cardiology office and spoke to [facility] and she stated that the patient would need an appointment. Not seen since April. When documenting noticed that the patient has passed as of 12/27/22. Procedure was previously cancelled.

## (undated) DEVICE — DEFENDO AIR WATER SUCTION AND BIOPSY VALVE KIT FOR  OLYMPUS: Brand: DEFENDO AIR/WATER/SUCTION AND BIOPSY VALVE

## (undated) DEVICE — ENDO KIT W/SYRINGE: Brand: MEDLINE INDUSTRIES, INC.

## (undated) DEVICE — BITEBLOCK 54FR W/ DENT RIM BLOX